# Patient Record
Sex: FEMALE | Race: WHITE | NOT HISPANIC OR LATINO | Employment: PART TIME | URBAN - METROPOLITAN AREA
[De-identification: names, ages, dates, MRNs, and addresses within clinical notes are randomized per-mention and may not be internally consistent; named-entity substitution may affect disease eponyms.]

---

## 2018-04-24 ENCOUNTER — TRANSFERRED RECORDS (OUTPATIENT)
Dept: HEALTH INFORMATION MANAGEMENT | Facility: CLINIC | Age: 48
End: 2018-04-24

## 2018-04-24 LAB
ALT SERPL-CCNC: 24 IU/L (ref 8–45)
AST SERPL-CCNC: 16 IU/L (ref 2–40)
CREAT SERPL-MCNC: 1.14 MG/DL (ref 0.57–1.11)
GFR SERPL CREATININE-BSD FRML MDRD: 51 ML/MIN/1.73M2
GLUCOSE SERPL-MCNC: 130 MG/DL (ref 65–100)
INR PPP: 1
POTASSIUM SERPL-SCNC: 3.9 MMOL/L (ref 3.5–5)

## 2018-04-25 ENCOUNTER — TRANSFERRED RECORDS (OUTPATIENT)
Dept: HEALTH INFORMATION MANAGEMENT | Facility: CLINIC | Age: 48
End: 2018-04-25

## 2018-04-25 LAB
CREAT SERPL-MCNC: 0.95 MG/DL (ref 0.57–1.11)
GFR SERPL CREATININE-BSD FRML MDRD: >60 ML/MIN/1.73M2
GLUCOSE SERPL-MCNC: 145 MG/DL (ref 65–100)
POTASSIUM SERPL-SCNC: 3.2 MMOL/L (ref 3.5–5)
POTASSIUM SERPL-SCNC: 3.7 MMOL/L (ref 3.5–5)

## 2018-04-26 ENCOUNTER — TRANSFERRED RECORDS (OUTPATIENT)
Dept: HEALTH INFORMATION MANAGEMENT | Facility: CLINIC | Age: 48
End: 2018-04-26

## 2018-04-26 LAB
CREAT SERPL-MCNC: 0.83 MG/DL (ref 0.57–1.11)
GFR SERPL CREATININE-BSD FRML MDRD: >60 ML/MIN/1.73M2
GLUCOSE SERPL-MCNC: 127 MG/DL (ref 65–100)
POTASSIUM SERPL-SCNC: 3.6 MMOL/L (ref 3.5–5)

## 2018-04-27 LAB
CREAT SERPL-MCNC: 0.89 MG/DL (ref 0.57–1.11)
GFR SERPL CREATININE-BSD FRML MDRD: >60 ML/MIN/1.73M2
GLUCOSE SERPL-MCNC: 136 MG/DL (ref 65–100)
POTASSIUM SERPL-SCNC: 3.8 MMOL/L (ref 3.5–5)

## 2019-02-14 ENCOUNTER — TRANSFERRED RECORDS (OUTPATIENT)
Dept: HEALTH INFORMATION MANAGEMENT | Facility: CLINIC | Age: 49
End: 2019-02-14

## 2019-03-27 ENCOUNTER — TRANSFERRED RECORDS (OUTPATIENT)
Dept: HEALTH INFORMATION MANAGEMENT | Facility: CLINIC | Age: 49
End: 2019-03-27

## 2019-04-09 ENCOUNTER — TRANSFERRED RECORDS (OUTPATIENT)
Dept: HEALTH INFORMATION MANAGEMENT | Facility: CLINIC | Age: 49
End: 2019-04-09

## 2019-04-12 ENCOUNTER — TRANSFERRED RECORDS (OUTPATIENT)
Dept: HEALTH INFORMATION MANAGEMENT | Facility: CLINIC | Age: 49
End: 2019-04-12

## 2019-04-12 ENCOUNTER — PRE VISIT (OUTPATIENT)
Dept: UROLOGY | Facility: CLINIC | Age: 49
End: 2019-04-12

## 2019-04-12 DIAGNOSIS — N31.9 NEUROGENIC BLADDER: Primary | ICD-10-CM

## 2019-04-12 NOTE — TELEPHONE ENCOUNTER
Reason for visit: recurrent kidney infections, possible blockage at the urostomy site    Relevant information: pt state she only has one kidney and has the urostomy since birth    Records/imaging/labs/orders: orders placed for creatinine and labs schedule    Pt called: yes    At Rooming: have catheters and bougies available

## 2019-04-15 ENCOUNTER — OFFICE VISIT (OUTPATIENT)
Dept: UROLOGY | Facility: CLINIC | Age: 49
End: 2019-04-15
Payer: COMMERCIAL

## 2019-04-15 ENCOUNTER — TRANSFERRED RECORDS (OUTPATIENT)
Dept: HEALTH INFORMATION MANAGEMENT | Facility: CLINIC | Age: 49
End: 2019-04-15

## 2019-04-15 VITALS
DIASTOLIC BLOOD PRESSURE: 62 MMHG | SYSTOLIC BLOOD PRESSURE: 119 MMHG | HEART RATE: 83 BPM | HEIGHT: 58 IN | WEIGHT: 157.1 LBS | BODY MASS INDEX: 32.98 KG/M2

## 2019-04-15 DIAGNOSIS — T85.858A STENOSIS OF ILEAL CONDUIT STOMA, INITIAL ENCOUNTER: Primary | ICD-10-CM

## 2019-04-15 DIAGNOSIS — N31.9 NEUROGENIC BLADDER: ICD-10-CM

## 2019-04-15 LAB
CREAT SERPL-MCNC: 0.87 MG/DL (ref 0.52–1.04)
GFR SERPL CREATININE-BSD FRML MDRD: 78 ML/MIN/{1.73_M2}

## 2019-04-15 RX ORDER — MOXIFLOXACIN 5 MG/ML
SOLUTION/ DROPS OPHTHALMIC
Refills: 1 | COMMUNITY
Start: 2019-03-22 | End: 2019-07-01

## 2019-04-15 RX ORDER — RIZATRIPTAN BENZOATE 10 MG/1
TABLET ORAL PRN
COMMUNITY
Start: 2018-02-28

## 2019-04-15 RX ORDER — LORAZEPAM 0.5 MG/1
0.5 TABLET ORAL PRN
COMMUNITY
Start: 2017-11-17 | End: 2023-07-20

## 2019-04-15 ASSESSMENT — ENCOUNTER SYMPTOMS
DOUBLE VISION: 0
FATIGUE: 0
CHILLS: 0
POLYDIPSIA: 0
POLYPHAGIA: 0
EYE REDNESS: 0
NIGHT SWEATS: 0
INCREASED ENERGY: 0
WEIGHT GAIN: 0
DECREASED APPETITE: 0
EYE PAIN: 0
WEIGHT LOSS: 0
ALTERED TEMPERATURE REGULATION: 0
EYE IRRITATION: 0
EYE WATERING: 0
HALLUCINATIONS: 0
FEVER: 1

## 2019-04-15 ASSESSMENT — PAIN SCALES - GENERAL: PAINLEVEL: NO PAIN (0)

## 2019-04-15 ASSESSMENT — MIFFLIN-ST. JEOR: SCORE: 1222.35

## 2019-04-15 NOTE — LETTER
4/15/2019       RE: Karo Lindsay  [address] [facility] [address]     Dear Colleague,    Thank you for referring your patient, Karo Lindsay, to the [facility] UROLOGY AND [facility] PROSTATE AND UROLOGIC CANCERS at [facility]. Please see a copy of my visit note below.      Urology Clinic    Akhil Causey MD  Date of Service: 04/15/2019     Name: Karo Lindsay  MRN: [id number]  Age: 49 year old  : 1970  Referring provider: Karlos Corrigan     Assessment and Plan:  1. Persistent moderate left hydroureteronephrosis of a solitary kidney in the setting of:    -cystectomy and ileal conduit urinary diversion.   -No visualized urinary tract calculi.   -Hx of right kidney removal in     2. Stomal stenosis    The patient describes having a Loopogram in the past at [facility]. We cannot view these images today.    The risks, benefits and alternatives of revision of ileal conduit were described. We discussed a same-day procedure if the stenosis area is superficial and a more complicated procedure with laparotomy if the stenosis is more extensive.      We discussed the risk of hernia being about 1/5. Risk of another stenosis is about 1/5. We covered surgical risks which include hernias, loss of sensation around incisions, decreased renal function, and infection.  We discussed that it is not likely to need a blood transfusion given this procedure. I discussed the risk with one kidney given the hydronephrosis.  Additional procedures may be necessary in the perioperative period. There are other additional unexpected outcomes which may occur.    3. 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 Roger Cadena    ---I will follow up with the outside images from [place]   --We will call the patient about further management for stomal  stenosis  ---------------------------------------------------------------------------------------------------------------------    Chief Complaint:  Evaluation for recurrent kidney stones, possible blockage at urostomy site, referred by Dr. Corrigan    HPI:   Karo Lindsay  is a 49 year old female with a history of chronic UTI's in the presence of a previous nephrectomy in 2005, history of cloacal exstrophy, congenital myelomeningocele, colostomy since age 1, previous hysterectomy, previous vaginal reconstruction surgery and history of ileal conduit at age 1. She has a remote history of kidney stones 20+ years ago (lithotripsy, last 30ish years ago). In regard to her chronic UTI's, she was previously getting urinary tract infections every 6-8 weeks about 5 years ago. She had previously been on self directed therapy 5.5 years ago. She had a recent CT scan on 04/09/2019 which shows: persistent moderate left hydroureteronephrosis of a solitary kidney with  cystectomy and ileal conduit urinary diversion. No visualized urinary tract calculi.      The patient also describes a autoimmune retinopathy for which she took Humira. She is no longer taking humira or other autoimmune suppressants. The patient is being followed by Dr. Roger Cadena for oncology. The patient had a limited left thoracotomy and lingulectomy for a malignant lung tumor. Final Surgical Pathology Revealed: Typical carcinoid tumor, 3.1 cm, in the left upper lobe lung. Benign surgical margin and no visceral pleural involvement. Three mediastinal lymph nodes are benign but show poorly formed non-necrotizing granulomas. Final pathologic stage [id number], Final stage IB.    Today, the patient states that she had some very severe pain on her left side similar to her kidney stones in the past. She notes that she may have recently passed a kidney stone.         Review of Systems:   Pertinent items are noted in HPI or as below, remainder of complete ROS is  "negative.      Active Medications:      LORazepam (ATIVAN) 0.5 MG tablet, Take 0.5 mg by mouth as needed, Disp: , Rfl:      rizatriptan (MAXALT) 10 MG tablet, Take by mouth as needed, Disp: , Rfl:      moxifloxacin (VIGAMOX) 0.5 % ophthalmic solution, INSTILL 1 DROP INTO RIGHT EYE 4 X A DAY START 1 DAY BEFORE SURGERY AND USE UNTIL BOTTLE IS FINISHED, Disp: , Rfl: 1      Allergies:   Moxifloxacin; Contrast dye; Propoxyphene n-apap; and Penicillins      Past Medical History:  Chronic UTI  Cloacal exstrophy      Past Surgical History:  Bladder ileal conduit  Hysterectomy  Nephrectomy    Family History:   No past pertinent family history.     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     Physical Exam:   B/P: 119/62, T: Data Unavailable, P: 83, R: Data Unavailable  Estimated body mass index is 32.83 kg/m  as calculated from the following:    Height as of this encounter: 1.473 m (4' 10\").    Weight as of this encounter: 71.3 kg (157 lb 1.6 oz).  General: age-appropriate appearing female in NAD. Normal body habitus.  HEENT: Head AT/NC, EOMI, CN Grossly intact  Abdomen: mild obesity , soft, non-distended, non-tender. No organomegaly. Surgical scars include:  Midline incision from pubic area to 3 finger breaths below the xiphoid process.  Right lower quadrant incision from prior osteotomy and a matching one on left, incisions in pubic area from cloacal exstrophy. Urostomy on the right side has a good mucosa of the stoma with an opening about the size of a pencil eraser. I was not able to insert a pinkie finger -- she reports that Dr. Corrigan could not insert a catheter. Colostomy on the left side everts nicely.    : Deferred  LE: No edema.   Neuro: grossly intact  Motor: excellent strength throughout  Skin: clear of rashes or ecchymoses.     Imaging:   I have personally reviewed the results of the below imaging studies. The results " were discussed with the patient.     03/13/2019 CT chest WO:  1.  Stable appearance of the chest with no evidence of disease recurrence.    Reading per radiology    04/09/2019 CT Abdomen, pelvis wo:  1.  Persistent moderate left hydroureteronephrosis of a solitary kidney with    cystectomy and ileal conduit urinary diversion. No visualized urinary tract    calculi. This may be on the basis of a stricture at the ureteroileal    anastomosis.    2.  Status post resection of a left carcinoid tumor.   Reading per radiology     Laboratory:   I reviewed all applicable laboratory and pathology data and went over findings with patient  Significant for     Lab Results   Component Value Date    CR 0.87 04/15/2019     Scribe Disclosure:  I, Sebastián Sidhu, am serving as a scribe to document services personally performed by Akhil Causey MD at this visit, based upon the provider's statements to me. All documentation has been reviewed by the aforementioned provider prior to being entered into the official medical record.     CC  No care team member to display  HUSSAIN MUNOZ    Copy to patient  ROSA ELENA WILKERSON ARMEN  [address] [place], [place] [id number]    Answers for HPI/ROS submitted by the patient on 4/15/2019   General Symptoms: Yes  Skin Symptoms: No  HENT Symptoms: No  EYE SYMPTOMS: Yes  HEART SYMPTOMS: No  LUNG SYMPTOMS: No  INTESTINAL SYMPTOMS: No  URINARY SYMPTOMS: No  GYNECOLOGIC SYMPTOMS: No  BREAST SYMPTOMS: No  SKELETAL SYMPTOMS: No  BLOOD SYMPTOMS: No  NERVOUS SYSTEM SYMPTOMS: No  MENTAL HEALTH SYMPTOMS: No  Fever: Yes  Loss of appetite: No  Weight loss: No  Weight gain: No  Fatigue: No  Night sweats: No  Chills: No  Increased stress: No  Excessive hunger: No  Excessive thirst: No  Feeling hot or cold when others believe the temperature is normal: No  Loss of height: No  Post-operative complications: No  Surgical site pain: No  Hallucinations: No  Change in or Loss of Energy:  No  Hyperactivity: No  Confusion: No  Eye pain: No  Vision loss: No  Dry eyes: No  Watery eyes: No  Eye bulging: No  Double vision: No  Flashing of lights: No  Spots: No  Floaters: Yes  Redness: No  Crossed eyes: No  Tunnel Vision: No  Yellowing of eyes: No  Eye irritation: No      Again, thank you for allowing me to participate in the care of your patient.      Sincerely,    Akhil Causey MD

## 2019-04-15 NOTE — NURSING NOTE
"Chief Complaint   Patient presents with     Consult     issues with urostomy, recurrent kidney infections       Blood pressure 119/62, pulse 83, height 1.473 m (4' 10\"), weight 71.3 kg (157 lb 1.6 oz). Body mass index is 32.83 kg/m .    There is no problem list on file for this patient.      Allergies   Allergen Reactions     Moxifloxacin Hives     Contrast Dye Other (See Comments)     \"I am not supposed to have because I only have one kidney.\"     Propoxyphene N-Apap Nausea and GI Disturbance     vomiting  vomiting       Penicillins Rash       Current Outpatient Medications   Medication Sig Dispense Refill     LORazepam (ATIVAN) 0.5 MG tablet Take 0.5 mg by mouth as needed       rizatriptan (MAXALT) 10 MG tablet Take by mouth as needed       moxifloxacin (VIGAMOX) 0.5 % ophthalmic solution INSTILL 1 DROP INTO RIGHT EYE 4 X A DAY START 1 DAY BEFORE SURGERY AND USE UNTIL BOTTLE IS FINISHED  1       Social History     Tobacco Use     Smoking status: Never Smoker     Smokeless tobacco: Never Used   Substance Use Topics     Alcohol use: None     Drug use: None       Kerry Echevarria LPN  4/15/2019  7:04 AM  "

## 2019-04-15 NOTE — PROGRESS NOTES
Urology Clinic    Akhil Causey MD  Date of Service: 04/15/2019     Name: Karo Lindsay  MRN: [id number]  Age: 49 year old  : 1970  Referring provider: Karlos Corrigan     Assessment and Plan:  1. Persistent moderate left hydroureteronephrosis of a solitary kidney in the setting of:    -cystectomy and ileal conduit urinary diversion.   -No visualized urinary tract calculi.   -Hx of right kidney removal in     2. Stomal stenosis    The patient describes having a Loopogram in the past at [facility]. We cannot view these images today.    The risks, benefits and alternatives of revision of ileal conduit were described. We discussed a same-day procedure if the stenosis area is superficial and a more complicated procedure with laparotomy if the stenosis is more extensive.      We discussed the risk of hernia being about 1/5. Risk of another stenosis is about 1/5. We covered surgical risks which include hernias, loss of sensation around incisions, decreased renal function, and infection.  We discussed that it is not likely to need a blood transfusion given this procedure. I discussed the risk with one kidney given the hydronephrosis.  Additional procedures may be necessary in the perioperative period. There are other additional unexpected outcomes which may occur.    3. Carcinoid tumor ([id number], Final stage IB), 3.1 cm, in the left upper lobe lung s/p Limited left thoracotomy 2018, Dr. Roger Cadena    ---I will follow up with the outside images from [place]   --We will call the patient about further management for stomal stenosis  ---------------------------------------------------------------------------------------------------------------------    Chief Complaint:  Evaluation for recurrent kidney stones, possible blockage at urostomy site, referred by Dr. Corrigan    HPI:   Karo Lindsay  is a 49 year old female with a history of chronic UTI's in the presence of a previous  nephrectomy in 2005, history of cloacal exstrophy, congenital myelomeningocele, colostomy since age 1, previous hysterectomy, previous vaginal reconstruction surgery and history of ileal conduit at age 1. She has a remote history of kidney stones 20+ years ago (lithotripsy, last 30ish years ago). In regard to her chronic UTI's, she was previously getting urinary tract infections every 6-8 weeks about 5 years ago. She had previously been on self directed therapy 5.5 years ago. She had a recent CT scan on 04/09/2019 which shows: persistent moderate left hydroureteronephrosis of a solitary kidney with  cystectomy and ileal conduit urinary diversion. No visualized urinary tract calculi.      The patient also describes a autoimmune retinopathy for which she took Humira. She is no longer taking humira or other autoimmune suppressants. The patient is being followed by Dr. Roger Cadena for oncology. The patient had a limited left thoracotomy and lingulectomy for a malignant lung tumor. Final Surgical Pathology Revealed: Typical carcinoid tumor, 3.1 cm, in the left upper lobe lung. Benign surgical margin and no visceral pleural involvement. Three mediastinal lymph nodes are benign but show poorly formed non-necrotizing granulomas. Final pathologic stage [id number], Final stage IB.    Today, the patient states that she had some very severe pain on her left side similar to her kidney stones in the past. She notes that she may have recently passed a kidney stone.         Review of Systems:   Pertinent items are noted in HPI or as below, remainder of complete ROS is negative.      Active Medications:      LORazepam (ATIVAN) 0.5 MG tablet, Take 0.5 mg by mouth as needed, Disp: , Rfl:      rizatriptan (MAXALT) 10 MG tablet, Take by mouth as needed, Disp: , Rfl:      moxifloxacin (VIGAMOX) 0.5 % ophthalmic solution, INSTILL 1 DROP INTO RIGHT EYE 4 X A DAY START 1 DAY BEFORE SURGERY AND USE UNTIL BOTTLE IS FINISHED, Disp: ,  "Rfl: 1      Allergies:   Moxifloxacin; Contrast dye; Propoxyphene n-apap; and Penicillins      Past Medical History:  Chronic UTI  Cloacal exstrophy      Past Surgical History:  Bladder ileal conduit  Hysterectomy  Nephrectomy    Family History:   No past pertinent family history.     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     Physical Exam:   B/P: 119/62, T: Data Unavailable, P: 83, R: Data Unavailable  Estimated body mass index is 32.83 kg/m  as calculated from the following:    Height as of this encounter: 1.473 m (4' 10\").    Weight as of this encounter: 71.3 kg (157 lb 1.6 oz).  General: age-appropriate appearing female in NAD. Normal body habitus.  HEENT: Head AT/NC, EOMI, CN Grossly intact  Abdomen: mild obesity , soft, non-distended, non-tender. No organomegaly. Surgical scars include:  Midline incision from pubic area to 3 finger breaths below the xiphoid process.  Right lower quadrant incision from prior osteotomy and a matching one on left, incisions in pubic area from cloacal exstrophy. Urostomy on the right side has a good mucosa of the stoma with an opening about the size of a pencil eraser. I was not able to insert a pinkie finger -- she reports that Dr. Corrigan could not insert a catheter. Colostomy on the left side everts nicely.    : Deferred  LE: No edema.   Neuro: grossly intact  Motor: excellent strength throughout  Skin: clear of rashes or ecchymoses.     Imaging:   I have personally reviewed the results of the below imaging studies. The results were discussed with the patient.     03/13/2019 CT chest WO:  1.  Stable appearance of the chest with no evidence of disease recurrence.    Reading per radiology    04/09/2019 CT Abdomen, pelvis wo:  1.  Persistent moderate left hydroureteronephrosis of a solitary kidney with    cystectomy and ileal conduit urinary diversion. No visualized urinary tract  "   calculi. This may be on the basis of a stricture at the ureteroileal    anastomosis.    2.  Status post resection of a left carcinoid tumor.   Reading per radiology     Laboratory:   I reviewed all applicable laboratory and pathology data and went over findings with patient  Significant for     Lab Results   Component Value Date    CR 0.87 04/15/2019     Scribe Disclosure:  I, Sebastián Sidhu, am serving as a scribe to document services personally performed by Akhil Causey MD at this visit, based upon the provider's statements to me. All documentation has been reviewed by the aforementioned provider prior to being entered into the official medical record.     CC  No care team member to display  HUSSAIN MUNOZ    Copy to patient  ROSA ELENA WILKERSON AYERS  [address] [place] [address]    Answers for HPI/ROS submitted by the patient on 4/15/2019   General Symptoms: Yes  Skin Symptoms: No  HENT Symptoms: No  EYE SYMPTOMS: Yes  HEART SYMPTOMS: No  LUNG SYMPTOMS: No  INTESTINAL SYMPTOMS: No  URINARY SYMPTOMS: No  GYNECOLOGIC SYMPTOMS: No  BREAST SYMPTOMS: No  SKELETAL SYMPTOMS: No  BLOOD SYMPTOMS: No  NERVOUS SYSTEM SYMPTOMS: No  MENTAL HEALTH SYMPTOMS: No  Fever: Yes  Loss of appetite: No  Weight loss: No  Weight gain: No  Fatigue: No  Night sweats: No  Chills: No  Increased stress: No  Excessive hunger: No  Excessive thirst: No  Feeling hot or cold when others believe the temperature is normal: No  Loss of height: No  Post-operative complications: No  Surgical site pain: No  Hallucinations: No  Change in or Loss of Energy: No  Hyperactivity: No  Confusion: No  Eye pain: No  Vision loss: No  Dry eyes: No  Watery eyes: No  Eye bulging: No  Double vision: No  Flashing of lights: No  Spots: No  Floaters: Yes  Redness: No  Crossed eyes: No  Tunnel Vision: No  Yellowing of eyes: No  Eye irritation: No

## 2019-04-24 ENCOUNTER — TRANSFERRED RECORDS (OUTPATIENT)
Dept: HEALTH INFORMATION MANAGEMENT | Facility: CLINIC | Age: 49
End: 2019-04-24

## 2019-04-29 ENCOUNTER — TELEPHONE (OUTPATIENT)
Dept: UROLOGY | Facility: CLINIC | Age: 49
End: 2019-04-29

## 2019-04-29 DIAGNOSIS — N13.30 HYDRONEPHROSIS, UNSPECIFIED HYDRONEPHROSIS TYPE: Primary | ICD-10-CM

## 2019-04-29 RX ORDER — SULFAMETHOXAZOLE/TRIMETHOPRIM 800-160 MG
1 TABLET ORAL 2 TIMES DAILY
Qty: 6 TABLET | Refills: 0 | Status: SHIPPED | OUTPATIENT
Start: 2019-04-29 | End: 2019-05-01

## 2019-04-29 NOTE — TELEPHONE ENCOUNTER
[facility] Call Center    Phone Message    May a detailed message be left on voicemail: yes    Reason for Call: Other: Karo calling back requesting a call back. Please call her back as soon as possible.      Action Taken: Message routed to:  [facility] miguel uro

## 2019-04-29 NOTE — TELEPHONE ENCOUNTER
[facility] Call Center    Phone Message    May a detailed message be left on voicemail: yes    Reason for Call: Other: Pt has started symptoms of a kidney infection, pt is also waiting to hear back regarding testing she had done a few weeks ago, please call to discuss     Action Taken: Message routed to:  [facility] Urology

## 2019-04-30 NOTE — TELEPHONE ENCOUNTER
[facility] Call Center    Phone Message    May a detailed message be left on voicemail: yes    Reason for Call: Other: Pt called to follow up with Dr. Causey regarding the kidney infection she currently has. She was given a prescription for levocrine to take for that, and the provider that prescribed it wanted the pt to ask Dr. Causey if he still wanted her to take bactim 1 day before and 2 days after her loopogram, or if the levocrine would be sufficient since she is currently responding well to that.     Action Taken: Message routed to:  [facility] Urology

## 2019-05-01 NOTE — TELEPHONE ENCOUNTER
Informed patient that has long as she's on the Levaquin the day before her loopogram, day of and day after that she doesn't need to take the Bactrim.  She will be on the Levaquin until Saturday so I will discontinue the Bactrim.    Vivien Sloan RN, BSN  Care Coordinator- Reconstructive Urology

## 2019-05-02 ENCOUNTER — ANCILLARY PROCEDURE (OUTPATIENT)
Dept: GENERAL RADIOLOGY | Facility: CLINIC | Age: 49
End: 2019-05-02
Attending: UROLOGY
Payer: COMMERCIAL

## 2019-05-02 ENCOUNTER — TELEPHONE (OUTPATIENT)
Dept: UROLOGY | Facility: CLINIC | Age: 49
End: 2019-05-02

## 2019-05-02 DIAGNOSIS — N13.30 HYDRONEPHROSIS, UNSPECIFIED HYDRONEPHROSIS TYPE: ICD-10-CM

## 2019-05-10 ENCOUNTER — PRE VISIT (OUTPATIENT)
Dept: UROLOGY | Facility: CLINIC | Age: 49
End: 2019-05-10

## 2019-05-10 NOTE — TELEPHONE ENCOUNTER
Reason for visit: discuss surgery     Relevant information:     Records/imaging/labs/orders: all records available    Pt called: no need for a call    At Rooming: regular

## 2019-05-13 ENCOUNTER — OFFICE VISIT (OUTPATIENT)
Dept: UROLOGY | Facility: CLINIC | Age: 49
End: 2019-05-13
Payer: COMMERCIAL

## 2019-05-13 ENCOUNTER — DOCUMENTATION ONLY (OUTPATIENT)
Dept: CARE COORDINATION | Facility: CLINIC | Age: 49
End: 2019-05-13

## 2019-05-13 VITALS
SYSTOLIC BLOOD PRESSURE: 131 MMHG | BODY MASS INDEX: 32.95 KG/M2 | DIASTOLIC BLOOD PRESSURE: 74 MMHG | HEART RATE: 98 BPM | WEIGHT: 157 LBS | HEIGHT: 58 IN

## 2019-05-13 DIAGNOSIS — N31.9 NEUROGENIC BLADDER: ICD-10-CM

## 2019-05-13 DIAGNOSIS — N13.30 HYDRONEPHROSIS, UNSPECIFIED HYDRONEPHROSIS TYPE: ICD-10-CM

## 2019-05-13 DIAGNOSIS — T85.858A STENOSIS OF ILEAL CONDUIT STOMA, INITIAL ENCOUNTER: Primary | ICD-10-CM

## 2019-05-13 ASSESSMENT — MIFFLIN-ST. JEOR: SCORE: 1221.9

## 2019-05-13 ASSESSMENT — PAIN SCALES - GENERAL: PAINLEVEL: NO PAIN (0)

## 2019-05-13 NOTE — LETTER
Date:May 16, 2019      Patient was self referred, no letter generated. Do not send.        [facility] Health Information

## 2019-05-13 NOTE — PROGRESS NOTES
Urology Clinic    Akhil Causey MD  Date of Service: 2019     Name: Karo Lindsay  MRN: [id number]  Age: 49 year old  : 1970  Referring provider: Provider Not In System     Assessment and Plan:  1. Persistent moderate left hydroureteronephrosis of a solitary kidney and recurrent UTI in setting of ileal conduit urinary diversion.  Loopogram reveals stenosis throughout at least the distal half of the conduit with associated reflux into a dilated left upper tract.  In light of the findings on extensive stenosis of the conduit, stomal revision would be of no utility.  Instead, patient will require conduit revision -- I recommend we start with circumferential dissection of the stoma at the skin level down to the fascia followed by midline laparotomy. After resecting the diseased section we would then scope her remaining proximal end of the conduit and decide whether we do one of three options for recosntruction:  1. Mature the proximal end to the skin  2. Add a section of ileum as a bridge to the skin  3. Resect the remainder of the conduit and built a completely new conduit.    We did discuss that some patients can be managed with a nephroureteral stent across the stenotic conduit but that given her long life expectancy and solitary kidney I recommended against that.     If she does not wish to proceed with surgery in the next couple of months then I recommend a PNT until surgery. I stressed the importance of an aggressive approach to her problem in light of the solitary kidney. She was hesitant to proceed because her Cr is normal right now and she thought she only needed antibiotic therapy for the UTIs.    After an extensive discussion the patient and family will proceed with a second opinion at [place]. With her permission, I emailed Dr. Jaime to expedite the appt. We will follow up by phone to discuss next steps. Of note she is interested in me reviewing her prior loopogram from 1.5 years ago  "at [place] to see if there has been progression of the stenosis in that time.         As the attending surgeon, I, Akhil Causey, spent 30 minutes with the patient with >50% in consultation and coordination of care.      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  She returns today for follow up and review of loopogram.      The patient reports one symptomatic UTI since last visit.  She experienced significant left flank pain following the loopogram which resolved the next day.      Review of Systems:   Pertinent items are noted in HPI or as below, remainder of complete ROS is negative.      Active Medications:     Current Outpatient Medications:      LORazepam (ATIVAN) 0.5 MG tablet, Take 0.5 mg by mouth as needed, Disp: , Rfl:      moxifloxacin (VIGAMOX) 0.5 % ophthalmic solution, INSTILL 1 DROP INTO RIGHT EYE 4 X A DAY START 1 DAY BEFORE SURGERY AND USE UNTIL BOTTLE IS FINISHED, Disp: , Rfl: 1     rizatriptan (MAXALT) 10 MG tablet, Take by mouth as needed, Disp: , Rfl:       Allergies:   Moxifloxacin; Contrast dye; Propoxyphene n-apap; and Penicillins      Past Medical History:  Chronic UTI  Cloacal Exstrophy      Past Surgical History:  C Remv bladder/nodes ileal conduit  Colostomy  Hysterectomy  Nephrectomy    Family History:   No past pertinent family history.     Social History:   The patient denies smoking or drug use     Physical Exam:   B/P: Data Unavailable, T: Data Unavailable, P: Data Unavailable, R: Data Unavailable  Estimated body mass index is 32.83 kg/m  " "as calculated from the following:    Height as of 4/15/19: 1.473 m (4' 10\").    Weight as of 4/15/19: 71.3 kg (157 lb 1.6 oz).  General: age-appropriate appearing female in NAD. Mildly obese body habitus.  HEENT: Head AT/NC, EOMI, CN Grossly intact  Resp: no respiratory distress, lung sounds clear.  Neuro: grossly intac       Imaging:   I have personally reviewed the results of the below imaging studies. The results were discussed with the patient.   Loopogram from May 2019 shows stenosis of the distal 1/2 of the conduit. There is reflux into a dilated renal collecting system.             Laboratory:   I reviewed all applicable laboratory and pathology data and went over findings with patient      Lab Results   Component Value Date    CR 0.87 04/15/2019     BMP RESULTS:  Recent Labs   Lab Test 04/15/19  0652   CR 0.87   GFRESTIMATED 78   GFRESTBLACK >90       Triston Montes MD - PGY4  Urology Resident        "

## 2019-05-13 NOTE — NURSING NOTE
"Chief Complaint   Patient presents with     Consult     hydronephrosis, discuss surgery       Blood pressure 131/74, pulse 98, height 1.473 m (4' 10\"), weight 71.2 kg (157 lb). Body mass index is 32.81 kg/m .    There is no problem list on file for this patient.      Allergies   Allergen Reactions     Moxifloxacin Hives     Contrast Dye Other (See Comments)     \"I am not supposed to have because I only have one kidney.\"     Propoxyphene N-Apap Nausea and GI Disturbance     vomiting  vomiting       Penicillins Rash       Current Outpatient Medications   Medication Sig Dispense Refill     LORazepam (ATIVAN) 0.5 MG tablet Take 0.5 mg by mouth as needed       moxifloxacin (VIGAMOX) 0.5 % ophthalmic solution INSTILL 1 DROP INTO RIGHT EYE 4 X A DAY START 1 DAY BEFORE SURGERY AND USE UNTIL BOTTLE IS FINISHED  1     rizatriptan (MAXALT) 10 MG tablet Take by mouth as needed         Social History     Tobacco Use     Smoking status: Never Smoker     Smokeless tobacco: Never Used   Substance Use Topics     Alcohol use: None     Drug use: None       Kerry Echevarria LPN  5/13/2019  2:08 PM  "

## 2019-05-13 NOTE — LETTER
2019       RE: Karo Lindsay  [address] [address]     Dear Colleague,    Thank you for referring your patient, Karo Lindsay, to the [facility] UROLOGY AND INST FOR PROSTATE AND UROLOGIC CANCERS at [facility]. Please see a copy of my visit note below.      Urology Clinic    Akhil Causey MD  Date of Service: 2019     Name: Karo Lindsay  MRN: [id number]  Age: 49 year old  : 1970  Referring provider: Provider Not In System     Assessment and Plan:  1. Persistent moderate left hydroureteronephrosis of a solitary kidney and recurrent UTI in setting of ileal conduit urinary diversion.  Loopogram reveals stenosis throughout at least the distal half of the conduit with associated reflux into a dilated left upper tract.  In light of the findings on extensive stenosis of the conduit, stomal revision would be of no utility.  Instead, patient will require conduit revision -- I recommend we start with circumferential dissection of the stoma at the skin level down to the fascia followed by midline laparotomy. After resecting the diseased section we would then scope her remaining proximal end of the conduit and decide whether we do one of three options for recosntruction:  1. Mature the proximal end to the skin  2. Add a section of ileum as a bridge to the skin  3. Resect the remainder of the conduit and built a completely new conduit.    We did discuss that some patients can be managed with a nephroureteral stent across the stenotic conduit but that given her long life expectancy and solitary kidney I recommended against that.     If she does not wish to proceed with surgery in the next couple of months then I recommend a PNT until surgery. I stressed the importance of an aggressive approach to her problem in light of the solitary kidney. She was hesitant to proceed because her Cr is normal right now and she thought she only needed  "antibiotic therapy for the UTIs.    After an extensive discussion the patient and family will proceed with a second opinion at [place]. With her permission, I emailed Dr. Jaime to expedite the appt. We will follow up by phone to discuss next steps. Of note she is interested in me reviewing her prior loopogram from 1.5 years ago at [place] to see if there has been progression of the stenosis in that time.         As the attending surgeon, I, Akhil Causey, spent 30 minutes with the patient with >50% in consultation and coordination of care.      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  She returns today for follow up and review of loopogram.      The patient reports one symptomatic UTI since last visit.  She experienced significant left flank pain following the loopogram which resolved the next day.      Review of Systems:   Pertinent items are noted in HPI or as below, remainder of complete ROS is negative.      Active Medications:     Current Outpatient Medications:      LORazepam (ATIVAN) 0.5 MG tablet, Take 0.5 mg by mouth as needed, Disp: , Rfl:      moxifloxacin (VIGAMOX) 0.5 % ophthalmic solution, INSTILL 1 DROP INTO RIGHT EYE 4 X A DAY START 1 DAY BEFORE SURGERY AND USE UNTIL BOTTLE IS FINISHED, Disp: , Rfl: 1     rizatriptan (MAXALT) 10 MG tablet, Take by mouth as needed, Disp: , Rfl:       Allergies:   Moxifloxacin; Contrast dye; Propoxyphene n-apap; and Penicillins      Past Medical History:  Chronic UTI  Cloacal Exstrophy      Past Surgical History:  YAZMIN Grimaldo " "bladder/nodes ileal conduit  Colostomy  Hysterectomy  Nephrectomy    Family History:   No past pertinent family history.     Social History:   The patient denies smoking or drug use     Physical Exam:   B/P: Data Unavailable, T: Data Unavailable, P: Data Unavailable, R: Data Unavailable  Estimated body mass index is 32.83 kg/m  as calculated from the following:    Height as of 4/15/19: 1.473 m (4' 10\").    Weight as of 4/15/19: 71.3 kg (157 lb 1.6 oz).  General: age-appropriate appearing female in NAD. Mildly obese body habitus.  HEENT: Head AT/NC, EOMI, CN Grossly intact  Resp: no respiratory distress, lung sounds clear.  Neuro: grossly intac       Imaging:   I have personally reviewed the results of the below imaging studies. The results were discussed with the patient.   Loopogram from May 2019 shows stenosis of the distal 1/2 of the conduit. There is reflux into a dilated renal collecting system.             Laboratory:   I reviewed all applicable laboratory and pathology data and went over findings with patient      Lab Results   Component Value Date    CR 0.87 04/15/2019     BMP RESULTS:  Recent Labs   Lab Test 04/15/19  0652   CR 0.87   GFRESTIMATED 78   GFRESTBLACK >90       Triston Montes MD - PGY4  Urology Resident          Again, thank you for allowing me to participate in the care of your patient.      Sincerely,    Akhil Causey MD      "

## 2019-05-14 ENCOUNTER — TELEPHONE (OUTPATIENT)
Dept: UROLOGY | Facility: CLINIC | Age: 49
End: 2019-05-14

## 2019-05-14 NOTE — TELEPHONE ENCOUNTER
[facility] Call Center    Phone Message    May a detailed message be left on voicemail: yes    Reason for Call: Requesting Results   Name/type of test: Loopogram  Date of test: 4.26.2018  Was test done at a location other than [facility] ?: Yes: [facility]    FYI: Pt simply provided the date of the exam    Action Taken: Message routed to:  [facility]

## 2019-05-17 NOTE — TELEPHONE ENCOUNTER
Left patient message to discuss.    Vivien Sloan, RN, BSN  Care Coordinator- Reconstructive Urology

## 2019-05-20 ENCOUNTER — PATIENT OUTREACH (OUTPATIENT)
Dept: UROLOGY | Facility: CLINIC | Age: 49
End: 2019-05-20

## 2019-05-20 NOTE — PROGRESS NOTES
Loopogram/nephrostogram on 5/2/19 along with Dr. Causey's clinic note from 5/13/19 faxed to [facility] for 2nd opinion to [phone].    Vivien Sloan, RN, BSN  Care Coordinator- Reconstructive Urology

## 2019-05-23 ENCOUNTER — TELEPHONE (OUTPATIENT)
Dept: UROLOGY | Facility: CLINIC | Age: 49
End: 2019-05-23

## 2019-05-23 NOTE — TELEPHONE ENCOUNTER
[facility]    Phone Message    May a detailed message be left on voicemail: yes    Reason for Call: Other: Pt reporting to Vivien with Dr. Causey----pt just got in touch with [place] regarding the images for loopogram.  [facility] still does NOT have access to these images. Please call pt to let her know when [facility] is sent these images. Pt is waiting. Thank you.     Action Taken: Message routed to:  [facility]  Urology clinic

## 2019-05-23 NOTE — TELEPHONE ENCOUNTER
Spoke to [facility], they request all records be sent to be reviewed prior to patient being able to get scheduled.  Message sent to [place]  to have loopogram from 5/2/19 be pushed to [facility].    Vivien Sloan RN, BSN  Care Coordinator- Reconstructive Urology

## 2019-05-28 ENCOUNTER — TELEPHONE (OUTPATIENT)
Dept: UROLOGY | Facility: CLINIC | Age: 49
End: 2019-05-28

## 2019-05-28 NOTE — TELEPHONE ENCOUNTER
[facility] Call Center    Phone Message    May a detailed message be left on voicemail: yes    Reason for Call: Other: Karo is calling to get more information on when she can have surgery and more information on the surgery itself. Please call pt back to further discuss.      Action Taken: Message routed to:  [facility]  Urology

## 2019-05-29 NOTE — TELEPHONE ENCOUNTER
Spoke to patient, she's scheduled for her second opinion with [facility] next Tuesday.  She's wondering how far out Dr. Causey is booking for surgery for a conduit revision.  Spoke to Eveline alba surgery scheduler and Dr. Causey is booking out about 2-3 weeks.  Informed patient, she will be back in touch with me after her appointment at [facility] with her decision.    Vivien Sloan RN, BSN  Care Coordinator- Reconstructive Urology

## 2019-06-04 ENCOUNTER — TRANSFERRED RECORDS (OUTPATIENT)
Dept: HEALTH INFORMATION MANAGEMENT | Facility: CLINIC | Age: 49
End: 2019-06-04

## 2019-06-11 ENCOUNTER — TRANSFERRED RECORDS (OUTPATIENT)
Dept: HEALTH INFORMATION MANAGEMENT | Facility: CLINIC | Age: 49
End: 2019-06-11

## 2019-06-27 ENCOUNTER — TELEPHONE (OUTPATIENT)
Dept: UROLOGY | Facility: CLINIC | Age: 49
End: 2019-06-27

## 2019-06-27 NOTE — TELEPHONE ENCOUNTER
[facility] Call Center    Phone Message    May a detailed message be left on voicemail: yes    Reason for Call: Symptoms or Concerns     If patient has red-flag symptoms, warm transfer to triage line    Current symptom or concern: Pain in kidney area L. She had the balloon procedure done at [place] on 6/11/2019. She experienced pain after the procedure and now. The dr at the the [place] said to get an US to see if there is blockage.     Symptoms have been present for: 24 hour(s)    Has patient previously been seen for this? No    By NA    Date: 6/27/2019  Are there any new or worsening symptoms? Yes: pain and vomited once yesterday      Action Taken: Message routed to:  [facility] urology

## 2019-06-28 ENCOUNTER — PRE VISIT (OUTPATIENT)
Dept: UROLOGY | Facility: CLINIC | Age: 49
End: 2019-06-28

## 2019-06-28 ENCOUNTER — ANCILLARY PROCEDURE (OUTPATIENT)
Dept: ULTRASOUND IMAGING | Facility: CLINIC | Age: 49
End: 2019-06-28
Attending: UROLOGY
Payer: COMMERCIAL

## 2019-06-28 ENCOUNTER — TELEPHONE (OUTPATIENT)
Dept: UROLOGY | Facility: CLINIC | Age: 49
End: 2019-06-28

## 2019-06-28 DIAGNOSIS — R10.9 RIGHT FLANK PAIN: Primary | ICD-10-CM

## 2019-06-28 DIAGNOSIS — R10.9 RIGHT FLANK PAIN: ICD-10-CM

## 2019-06-28 NOTE — TELEPHONE ENCOUNTER
Reason for visit: review imaging for hydronephrosis in solitary kidney     Relevant information: Od[place], ileal conduit    Records/imaging/labs/orders: imaging available    Pt called: no need for  call    At [facility]: regular

## 2019-06-28 NOTE — TELEPHONE ENCOUNTER
Spoke to patient to inform her that Dr. Causey will order a renal ultrasound for her severe left flank pain she's having since her balloon dilation of ureter done at [place] on 6/11/19.    Vivien Sloan RN, BSN  Care Coordinator- Reconstructive Urology

## 2019-06-28 NOTE — TELEPHONE ENCOUNTER
----- Message from Vivien Sloan RN sent at 6/28/2019  9:38 AM CDT -----  Regarding: Urgent- Renal US Today  Hi,    Please schedule a renal ultrasound today if possible.    Thanks,  Vivien

## 2019-06-28 NOTE — TELEPHONE ENCOUNTER
[facility] Call Center    Phone Message    May a detailed message be left on voicemail: yes    Reason for Call: Other: Pt calling back re: message below. She is still waiting to hear from the clinic.     Action Taken: Message routed to:  [facility]

## 2019-07-01 ENCOUNTER — ALLIED HEALTH/NURSE VISIT (OUTPATIENT)
Dept: UROLOGY | Facility: CLINIC | Age: 49
End: 2019-07-01
Payer: COMMERCIAL

## 2019-07-01 ENCOUNTER — PRE VISIT (OUTPATIENT)
Dept: SURGERY | Facility: CLINIC | Age: 49
End: 2019-07-01

## 2019-07-01 ENCOUNTER — OFFICE VISIT (OUTPATIENT)
Dept: UROLOGY | Facility: CLINIC | Age: 49
End: 2019-07-01
Payer: COMMERCIAL

## 2019-07-01 VITALS
HEIGHT: 58 IN | HEART RATE: 98 BPM | WEIGHT: 155.6 LBS | DIASTOLIC BLOOD PRESSURE: 80 MMHG | SYSTOLIC BLOOD PRESSURE: 122 MMHG | BODY MASS INDEX: 32.66 KG/M2

## 2019-07-01 DIAGNOSIS — N31.9 NEUROGENIC BLADDER: ICD-10-CM

## 2019-07-01 DIAGNOSIS — T85.858A STENOSIS OF ILEAL CONDUIT STOMA, INITIAL ENCOUNTER: Primary | ICD-10-CM

## 2019-07-01 DIAGNOSIS — N31.9 NEUROGENIC BLADDER: Primary | ICD-10-CM

## 2019-07-01 RX ORDER — PREDNISOLONE ACETATE 10 MG/ML
SUSPENSION/ DROPS OPHTHALMIC
COMMUNITY
Start: 2019-03-28 | End: 2023-07-20

## 2019-07-01 RX ORDER — KETOROLAC TROMETHAMINE 4 MG/ML
1 SOLUTION/ DROPS OPHTHALMIC 3 TIMES DAILY
COMMUNITY
Start: 2019-03-08

## 2019-07-01 RX ORDER — ACETAMINOPHEN 500 MG
1000 TABLET ORAL EVERY 4 HOURS PRN
Status: ON HOLD | COMMUNITY
Start: 2018-06-13 | End: 2019-07-21

## 2019-07-01 ASSESSMENT — MIFFLIN-ST. JEOR: SCORE: 1215.55

## 2019-07-01 ASSESSMENT — PAIN SCALES - GENERAL: PAINLEVEL: MODERATE PAIN (5)

## 2019-07-01 NOTE — PROGRESS NOTES
Urology Clinic    Akhil Causey MD  Date of Service: 2019     Name: Karo Lindsay  MRN: [id number]  Age: 49 year old  : 1970  Referring provider: Referred Self         Assessment and Plan:  Persistent moderate left hydroureteronephrosis of a solitary kidney and recurrent UTI in setting of ileal conduit urinary diversion.      At this point, the patient is not tolerating nonoperative management would like to proceed with ileal conduit revision.  We discussed that we could start the surgery with an attempt at looposcopy to see how long the stenosis is but that I suspect it is long enough to require replacement of the entire conduit.  Even if we are able to get away without resection of the entire conduit I expect this will require a laparotomy and cannot just be done with the peristomal incision..    We discussed the risk to include but not be limited to bleeding, infection, parastomal hernia, bowel obstruction and intestinal leak.  She would like to proceed.    Martínez Reilly MD, Urology Resident    =======================================================  As the attending surgeon I, Akhil Causey, interviewed and examined the patient. The plan was developed between me and the patient. My findings and plan are as stated by the resident.    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 Mx Lindsay was born with cloacal extrophy and had a cystectomy, end colostomy (age 1) and ileal conduit. She had a normally positioned left kidney and a right pelvic kidney. She did well until the age of 17 years when she had trouble with recurrent UTIs and nephrolithiasis with high fevers, malaise, nausea and vomiting resulting in hospitalizations to treat half  of her UTI episodes. She had multiple stone procedures. A MAG3 renogram revealed 13% function of her right kidney. A right nephrectomy was performed in 2005.     She continued to have occasional UTIs. She has been followed in Nephrology by Dr. Tucker Holder. She has been seen in Urology at [place] for loopograms under Dr. Sunil Causey. 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     I last saw the patient on 05/13/2019 during which time she presented with recurrent UTI's and persistent moderate left hydroureteronephrosis of a solitary kidney. Loopogram (05/02/2019) reveals stenosis throughout at least the distal half of the conduit with associated reflux into a dilated left upper tract. Ms. Lindsay notes that this was quite difficult and that they were not able to get an 8 Fr, but could pass a 4 Fr in the most distal aspect in order to perform the loopogram.    In light of the findings on extensive stenosis of the conduit, I explained that stomal revision would be of no utility.  Instead, patient will require conduit revision -- I recommend we start with circumferential dissection of the stoma at the skin level down to the fascia followed by midline laparotomy. After resecting the diseased section we would then scope her remaining proximal end of the conduit and decide whether we do one of three options for recosntruction:    1. Mature the proximal end to the skin  2. Add a section of ileum as a bridge to the skin  3. Resect the remainder of the conduit and built a completely new conduit.     We did discuss that some patients can be managed with a nephroureteral stent across the stenotic conduit but that given her long life expectancy and solitary kidney I recommended against that.      At that time, she did not wish to proceed with surgery in the next couple of months, and I recommend a PNT until surgery. I stressed the importance of an aggressive approach to her  problem in light of the solitary kidney. She was hesitant to proceed because her Cr was normal and she thought she only needed antibiotic therapy for the UTIs.     The patient then met with Dr. Jon Jaime at the [facility] for a second opinion. Dr. Jaime saw the patient on 06/04/2019 and per chart review discussed balloon dilation of the conduit with placement of a catheter as an alternative to nephrostomy tube placement. He noted this would required daily irrigation and frequent catheter exchange and is only to serve is a temporizing measure until she can tolerate more formal reconstruction.    Today, the patient presents after the nephroureteral stent was placed at [facility].  She has not tolerated it well.  She has had constant aching pain in the left kidney since placement about 3 weeks ago.  She is in tears today and would like to proceed with revision of her ileal conduit.  We discussed the option of placing a nephrostomy tube instead as a bridge to surgery and so that she could wait until the fall for surgery as she previously desired but at this point she really just wants to proceed with surgery soon as possible and avoid the nephrostomy tube.    She had called me about her pain last week and we ordered a renal ultrasound.  This shows the same amount of moderate hydronephrosis as before the stent.  So I do not think that there is any acute obstruction that needs urgent correction within the next few days but I am concerned that the persistent pain indicates some ongoing minor obstruction.    Review of Systems:   Pertinent items are noted in HPI or as below, remainder of complete ROS is negative.      Physical Exam:   There were no vitals taken for this visit.   There is no height or weight on file to calculate BMI.  General: Alert, no acute distress, oriented  HENT: Good dentition  Lungs: No respiratory distress, or pursed lip breathing  Heart: No obvious jugular venous distension present  Abdomen: Soft,  nontender, no organomegaly or masses, stoma appears pink and viable but narrow and it has a stent exiting from it currently, colostomy with adequate output ; both ostomies are at the same level on her upper abdomen and she would like to keep them both there  Musculoskeletal: Extremities normal, no peripheral edema  Skin: No suspicious lesions or rashes ; no skin breakdown on her abdomen or around the stomas    Laboratory:   I reviewed all applicable laboratory and pathology data and went over findings with patient  Significant for     Lab Results   Component Value Date    CR 0.87 04/15/2019    CR 0.89 04/27/2018    CR 0.83 04/26/2018    CR 0.95 04/25/2018    CR 1.14 04/24/2018     Imaging:   I personally reviewed all applicable imaging and went over the below findings with patient.    Results for orders placed or performed in visit on 06/28/19   US Renal Complete    Narrative    EXAMINATION: US RENAL COMPLETE, 6/28/2019 1:57 PM     COMPARISON: Loopogram and  nephrostogram dated 5/2/2019    HISTORY: Right flank pain    FINDINGS:    Right kidney: Surgically absent     Left kidney: Measures 11.7 cm in length. Parenchyma is of normal  thickness and echogenicity. Moderate hydronephrosis. Ureteral stent is  partially visualized. Simple appearing cyst in upper pole of the left  kidney measuring 2.4 x 2.2 x 1.6 cm      Bladder: Surgically absent    Hepatic steatosis incidentally seen.        Impression    IMPRESSION:   1. Moderate left sided hydronephrosis.  2. Partial visualization of left ureteral stent.    I have personally reviewed the examination and initial interpretation  and I agree with the findings.    MARIE FALK MD     CC  No care team member to display  SELF, REFERRED    Copy to patient  ROSA ELENA WILKERSON ARMEN  [address] [place] [id number]

## 2019-07-01 NOTE — PROGRESS NOTES
Pre Op Teaching Flowsheet       Pre and Post op Patient Education  Relevant Diagnosis:  urinary conduit stricture  Teaching Topic:  Pre and post op teaching for revision or replacement of urinary conduit, looposcopy  Person Involved in teaching:  Karo Lindsay      Motivation Level:  Asks Questions: Yes  Eager to Learn:  Yes  Cooperative: Yes  Receptive (willing/able to accept information):  Yes  Patient demonstrates understanding of the following:  Date and time of surgery:  7/10/19  Location of surgery: [address] [place] [address]  History and Physical and any other testing necessary prior to surgery: Yes  Required time line for completion of History and Physical and any pre-op testing: Yes    NPO Guidelines: NPO per Anesthesia Guidelines    Patient demonstrates understanding of the following:  Patient understands the need for a responsible adult to drive them home and someone to stay with them for the first 24 hours post-operatively: Patient staying 7 days inpatient  Pre-op bowel prep: Yes  Pre-op showering/scrub information with Hibiclens Soap: Yes  Medications to take the day of surgery:  Per PCP  Blood thinner medications discussed and when to stop (if applicable):  Yes  Diabetes medication management (if applicable):  Patient to discuss with Primary Care Provider  Discussed pain control after surgery: pain scale, pain medications and pain management techniques  Infection Prevention: Patient demonstrates understanding of the following:  Patient instructed on hand hygiene:  Yes  Surgical procedure site care taught: Yes  Signs and symptoms of infection taught:  Yes  Wound care will be taught at the time of discharge.  Central venous catheter care will be taught at the time of discharge (if applicable).    Post-op follow-up:  Discussed how to contact the hospital, nurse, and clinic scheduling staff if necessary.    Instructional materials used/given/mailed:  [facility] Booklet, post op teaching sheet, Map,  Soap, and arrival/location information.    Surgical instructions given to patient in clinic: Yes.    Instructional Materials given:  Before your surgery packet , Medications to avoid before surgery , Showering or Bathing instructions before surgery  and What to expect after surgery    Post-op appointment/testing scheduled per MD orders: Yes    Total time with patient: 10 minutes    Vivien Sloan RN, BSN  Urology Care Coordinator

## 2019-07-01 NOTE — NURSING NOTE
"Chief Complaint   Patient presents with     RECHECK     Hydronephrosis       Blood pressure 122/80, pulse 98, height 1.473 m (4' 10\"), weight 70.6 kg (155 lb 9.6 oz). Body mass index is 32.52 kg/m .    There is no problem list on file for this patient.      Allergies   Allergen Reactions     Moxifloxacin Hives     Contrast Dye Other (See Comments)     \"I am not supposed to have because I only have one kidney.\"     Propoxyphene N-Apap Nausea and GI Disturbance     vomiting  vomiting       Penicillins Rash       Current Outpatient Medications   Medication Sig Dispense Refill     acetaminophen (TYLENOL) 500 MG tablet Take 1,000 mg by mouth       ketorolac tromethamine (ACULAR-LS) 0.4 % SOLN ophthalmic solution        LORazepam (ATIVAN) 0.5 MG tablet Take 0.5 mg by mouth as needed       prednisoLONE acetate (PRED FORTE) 1 % ophthalmic suspension USE 1 DROP RIGHT EYE 4X/DAY. START 1 DAY PRIOR TO SURGERY AND USE UNTIL INSTRUCTED TO DISCONTINUE.       rizatriptan (MAXALT) 10 MG tablet Take by mouth as needed         Social History     Tobacco Use     Smoking status: Never Smoker     Smokeless tobacco: Never Used   Substance Use Topics     Alcohol use: None     Drug use: None       CLAYTON Rodriguez  7/1/2019  7:05 AM     "

## 2019-07-01 NOTE — LETTER
2019       RE: Karo Lindsay  [address] [facility] [address]     Dear Colleague,    Thank you for referring your patient, Karo Lindsay, to the [facility] UROLOGY AND [facility] FOR PROSTATE AND UROLOGIC CANCERS at [facility]. Please see a copy of my visit note below.      Urology Clinic    Akhil Causey MD  Date of Service: 2019     Name: Karo Lindsay  MRN: [id number]  Age: 49 year old  : 1970  Referring provider: Referred Self         Assessment and Plan:  Persistent moderate left hydroureteronephrosis of a solitary kidney and recurrent UTI in setting of ileal conduit urinary diversion.      At this point, the patient is not tolerating nonoperative management would like to proceed with ileal conduit revision.  We discussed that we could start the surgery with an attempt at looposcopy to see how long the stenosis is but that I suspect it is long enough to require replacement of the entire conduit.  Even if we are able to get away without resection of the entire conduit I expect this will require a laparotomy and cannot just be done with the peristomal incision..    We discussed the risk to include but not be limited to bleeding, infection, parastomal hernia, bowel obstruction and intestinal leak.  She would like to proceed.    Martínez Reilly MD, Urology Resident    =======================================================  As the attending surgeon I, Akhil Causey, interviewed and examined the patient. The plan was developed between me and the patient. My findings and plan are as stated by the resident.    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 She had a normally positioned left kidney and a right pelvic kidney. She did well until the age of 17 years when she had trouble with recurrent UTIs and nephrolithiasis with high fevers, malaise, nausea and vomiting resulting in hospitalizations to treat half of her UTI episodes. She had multiple stone procedures. A MAG3 renogram revealed 13% function of her right kidney. A right nephrectomy was performed in 2005.     She continued to have occasional UTIs. She has been followed in Nephrology by Dr. Tucker Holder. She has been seen in Urology at [facility] for loopograms under Dr. Sunil Causey. 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     I last saw the patient on 05/13/2019 during which time she presented with recurrent UTI's and persistent moderate left hydroureteronephrosis of a solitary kidney. Loopogram (05/02/2019) reveals stenosis throughout at least the distal half of the conduit with associated reflux into a dilated left upper tract. Ms. Lindsay notes that this was quite difficult and that they were not able to get an 8 Fr, but could pass a 4 Fr in the most distal aspect in order to perform the loopogram.    In light of the findings on extensive stenosis of the conduit, I explained that stomal revision would be of no utility.  Instead, patient will require conduit revision -- I recommend we start with circumferential dissection of the stoma at the skin level down to the fascia followed by midline laparotomy. After resecting the diseased section we would then scope her remaining proximal end of the conduit and decide whether we do one of three options for recosntruction:    1. Mature the proximal end to the skin  2. Add a section of ileum as a bridge to the skin  3. Resect the remainder of the conduit and built a completely new conduit.     We did discuss that some patients can be managed with a  nephroureteral stent across the stenotic conduit but that given her long life expectancy and solitary kidney I recommended against that.      At that time, she did not wish to proceed with surgery in the next couple of months, and I recommend a PNT until surgery. I stressed the importance of an aggressive approach to her problem in light of the solitary kidney. She was hesitant to proceed because her Cr was normal and she thought she only needed antibiotic therapy for the UTIs.     The patient then met with Dr. Jon Jaime at the [facility] for a second opinion. Dr. Jaime saw the patient on 06/04/2019 and per chart review discussed balloon dilation of the conduit with placement of a catheter as an alternative to nephrostomy tube placement. He noted this would required daily irrigation and frequent catheter exchange and is only to serve is a temporizing measure until she can tolerate more formal reconstruction.    Today, the patient presents after the nephroureteral stent was placed at [facility].  She has not tolerated it well.  She has had constant aching pain in the left kidney since placement about 3 weeks ago.  She is in tears today and would like to proceed with revision of her ileal conduit.  We discussed the option of placing a nephrostomy tube instead as a bridge to surgery and so that she could wait until the fall for surgery as she previously desired but at this point she really just wants to proceed with surgery soon as possible and avoid the nephrostomy tube.    She had called me about her pain last week and we ordered a renal ultrasound.  This shows the same amount of moderate hydronephrosis as before the stent.  So I do not think that there is any acute obstruction that needs urgent correction within the next few days but I am concerned that the persistent pain indicates some ongoing minor obstruction.    Review of Systems:   Pertinent items are noted in HPI or as below, remainder of complete ROS is  negative.      Physical Exam:   There were no vitals taken for this visit.   There is no height or weight on file to calculate BMI.  General: Alert, no acute distress, oriented  HENT: Good dentition  Lungs: No respiratory distress, or pursed lip breathing  Heart: No obvious jugular venous distension present  Abdomen: Soft, nontender, no organomegaly or masses, stoma appears pink and viable but narrow and it has a stent exiting from it currently, colostomy with adequate output ; both ostomies are at the same level on her upper abdomen and she would like to keep them both there  Musculoskeletal: Extremities normal, no peripheral edema  Skin: No suspicious lesions or rashes ; no skin breakdown on her abdomen or around the stomas    Laboratory:   I reviewed all applicable laboratory and pathology data and went over findings with patient  Significant for     Lab Results   Component Value Date    CR 0.87 04/15/2019    CR 0.89 04/27/2018    CR 0.83 04/26/2018    CR 0.95 04/25/2018    CR 1.14 04/24/2018     Imaging:   I personally reviewed all applicable imaging and went over the below findings with patient.    Results for orders placed or performed in visit on 06/28/19   US Renal Complete    Narrative    EXAMINATION: US RENAL COMPLETE, 6/28/2019 1:57 PM     COMPARISON: Loopogram and  nephrostogram dated 5/2/2019    HISTORY: Right flank pain    FINDINGS:    Right kidney: Surgically absent     Left kidney: Measures 11.7 cm in length. Parenchyma is of normal  thickness and echogenicity. Moderate hydronephrosis. Ureteral stent is  partially visualized. Simple appearing cyst in upper pole of the left  kidney measuring 2.4 x 2.2 x 1.6 cm      Bladder: Surgically absent    Hepatic steatosis incidentally seen.        Impression    IMPRESSION:   1. Moderate left sided hydronephrosis.  2. Partial visualization of left ureteral stent.    I have personally reviewed the examination and initial interpretation  and I agree with the  findings.    MARIE FALK MD     CC  No care team member to display  SELF, REFERRED    Copy to patient  ROSA ELENA AYERS  [address] [place] [id number]      Again, thank you for allowing me to participate in the care of your patient.      Sincerely,    Akhil Causey MD

## 2019-07-07 DIAGNOSIS — R82.79 URINE CULTURE POSITIVE: Primary | ICD-10-CM

## 2019-07-07 DIAGNOSIS — T85.858A STENOSIS OF ILEAL CONDUIT STOMA, INITIAL ENCOUNTER: ICD-10-CM

## 2019-07-07 DIAGNOSIS — N31.9 NEUROGENIC BLADDER: ICD-10-CM

## 2019-07-07 RX ORDER — NITROFURANTOIN 25; 75 MG/1; MG/1
100 CAPSULE ORAL 2 TIMES DAILY
Qty: 6 CAPSULE | Refills: 0 | Status: ON HOLD | OUTPATIENT
Start: 2019-07-07 | End: 2019-07-11

## 2019-07-07 NOTE — PROGRESS NOTES
Urine culture from 7/3 growing citrobacter and enterococcus. Macrobid 3 days called to patients pharmacy to start tonight.

## 2019-07-08 ENCOUNTER — TELEPHONE (OUTPATIENT)
Dept: UROLOGY | Facility: CLINIC | Age: 49
End: 2019-07-08

## 2019-07-08 ENCOUNTER — ANESTHESIA EVENT (OUTPATIENT)
Dept: SURGERY | Facility: CLINIC | Age: 49
End: 2019-07-08
Payer: COMMERCIAL

## 2019-07-08 DIAGNOSIS — N31.9 NEUROGENIC BLADDER: Primary | ICD-10-CM

## 2019-07-08 RX ORDER — NITROFURANTOIN MACROCRYSTAL 100 MG
100 CAPSULE ORAL 2 TIMES DAILY
COMMUNITY
End: 2019-07-08

## 2019-07-08 NOTE — OR NURSING
Pt asked if you could arrange an Ostomy Nurse.   Received: Today   Message Contents   Shantelle Howell RN Elliott, Sean Patrick, MD   Cc: Vivien Sloan RN             Hi Dr. Mondragon and Vivien,     Pre-op call done with pt who said she will need the stoma nurse to come by for help with appliances for her 2 ostomies, while she is in the hospital. Can you please order this?     Thanks.     Shantelle Howell RN, BSN   [facility] Preadmission Nursing   [phone]

## 2019-07-08 NOTE — TELEPHONE ENCOUNTER
Spoke to patient to let her know she doesn't need a bowel prep per Dr. Causey.    Vivien Sloan, RN, BSN  Care Coordinator- Reconstructive Urology

## 2019-07-08 NOTE — OR NURSING
RE: Pt asked if you could arrange an Ostomy Nurse.   Received: Today   Message Contents   Shantelle Howell, Vivien Velasco, RN             The pt told me she needed an ostomy nurse to help her with her appliances and wanted you or Dr Mondragon to arrange this. I explained I don't work directly with Dr Mondragon and that she should ask you, she said she just wanted me to let you know. I thought she needed an order for this but if not, could you please help arrange this or call pt to see exactly what she needs?     Thanks so much.     Shantelle    Previous Messages      ----- Message -----   From: Vivien Sloan, RN   Sent: 7/8/2019   2:34 PM   To: Akhil Causey MD, Shantelle Howell RN   Subject: RE: Pt asked if you could arrange an Ostomy *     Sixto Pepper,     To my knowledge and the rest of the RN's here we aren't aware of orders being placed when patients stay inpatient with ostomies.  Is there something in particular you're referencing?     Vivien Beaulieu   ----- Message -----   From: Shantelle Howell RN   Sent: 7/8/2019   2:26 PM   To: Akhil Causey MD, Vivien Sloan RN   Subject: Pt asked if you could arrange an Ostomy Nurs*     Hi Dr. Mondragon and Vivien,     Pre-op call done with pt who said she will need the stoma nurse to come by for help with appliances for her 2 ostomies, while she is in the hospital. Can you please order this?     Thanks.     Shantelle Howell, RN, BSN   [facility] Preadmission Nursing   [phone]

## 2019-07-08 NOTE — TELEPHONE ENCOUNTER
Health Call Center    Phone Message    May a detailed message be left on voicemail: yes    Reason for Call: Other: Pt requests call back this morning - has question for her pre surgery prep - Pt was given items to start today to clean herself out for surgery - but Pt wants Dr Mondragon to know she does not have a colon - and wondering if she still has to use those items for prep or not - please return her call either way - Thanks!     Action Taken: Message routed to:  [facility] Urology Clinic

## 2019-07-09 ENCOUNTER — HOSPITAL ENCOUNTER (OUTPATIENT)
Dept: VASCULAR ULTRASOUND | Facility: CLINIC | Age: 49
Discharge: HOME | End: 2019-07-09
Attending: UROLOGY | Admitting: UROLOGY
Payer: COMMERCIAL

## 2019-07-09 ENCOUNTER — HOSPITAL ENCOUNTER (OUTPATIENT)
Dept: GENERAL RADIOLOGY | Facility: CLINIC | Age: 49
End: 2019-07-09
Attending: UROLOGY
Payer: COMMERCIAL

## 2019-07-09 ENCOUNTER — ANESTHESIA (OUTPATIENT)
Dept: VASCULAR ULTRASOUND | Facility: CLINIC | Age: 49
End: 2019-07-09

## 2019-07-09 DIAGNOSIS — N31.9 NEUROGENIC BLADDER: ICD-10-CM

## 2019-07-09 PROCEDURE — 27211389 ZZ H KIT, 5 FR DL BIOFLO OPEN ENDED PICC

## 2019-07-09 PROCEDURE — 36569 INSJ PICC 5 YR+ W/O IMAGING: CPT

## 2019-07-09 PROCEDURE — 25000125 ZZHC RX 250: Performed by: UROLOGY

## 2019-07-09 PROCEDURE — 27211414 ZZ H ADHESIVE SKIN CLOSURE, DERMABOND

## 2019-07-09 PROCEDURE — 40000986 XR CHEST 1 VW

## 2019-07-09 RX ORDER — HEPARIN SODIUM,PORCINE 10 UNIT/ML
2-5 VIAL (ML) INTRAVENOUS
Status: DISCONTINUED | OUTPATIENT
Start: 2019-07-09 | End: 2019-07-09

## 2019-07-09 RX ORDER — LIDOCAINE 40 MG/G
CREAM TOPICAL
Status: DISCONTINUED | OUTPATIENT
Start: 2019-07-09 | End: 2019-07-10 | Stop reason: HOSPADM

## 2019-07-09 RX ORDER — HEPARIN SODIUM,PORCINE 10 UNIT/ML
2-5 VIAL (ML) INTRAVENOUS
Status: DISCONTINUED | OUTPATIENT
Start: 2019-07-09 | End: 2019-07-10 | Stop reason: HOSPADM

## 2019-07-09 RX ORDER — LIDOCAINE 40 MG/G
CREAM TOPICAL
Status: DISCONTINUED | OUTPATIENT
Start: 2019-07-09 | End: 2019-07-09

## 2019-07-09 RX ADMIN — LIDOCAINE HYDROCHLORIDE 1 ML: 10 INJECTION, SOLUTION EPIDURAL; INFILTRATION; INTRACAUDAL; PERINEURAL at 11:01

## 2019-07-10 ENCOUNTER — HOSPITAL ENCOUNTER (INPATIENT)
Facility: CLINIC | Age: 49
LOS: 11 days | Discharge: HOME HEALTH | End: 2019-07-21
Attending: UROLOGY | Admitting: UROLOGY
Payer: COMMERCIAL

## 2019-07-10 ENCOUNTER — APPOINTMENT (OUTPATIENT)
Dept: GENERAL RADIOLOGY | Facility: CLINIC | Age: 49
End: 2019-07-10
Attending: UROLOGY
Payer: COMMERCIAL

## 2019-07-10 ENCOUNTER — TELEPHONE (OUTPATIENT)
Dept: UROLOGY | Facility: CLINIC | Age: 49
End: 2019-07-10

## 2019-07-10 ENCOUNTER — ANESTHESIA (OUTPATIENT)
Dept: SURGERY | Facility: CLINIC | Age: 49
End: 2019-07-10
Payer: COMMERCIAL

## 2019-07-10 DIAGNOSIS — Z93.6 S/P ILEAL CONDUIT (H): ICD-10-CM

## 2019-07-10 DIAGNOSIS — T85.858A STENOSIS OF ILEAL CONDUIT STOMA, INITIAL ENCOUNTER: Primary | ICD-10-CM

## 2019-07-10 LAB
ANION GAP SERPL CALCULATED.3IONS-SCNC: 14 MMOL/L (ref 3–14)
BUN SERPL-MCNC: 15 MG/DL (ref 7–30)
CALCIUM SERPL-MCNC: 8.1 MG/DL (ref 8.5–10.1)
CHLORIDE SERPL-SCNC: 108 MMOL/L (ref 94–109)
CO2 SERPL-SCNC: 17 MMOL/L (ref 20–32)
CREAT SERPL-MCNC: 0.93 MG/DL (ref 0.52–1.04)
ERYTHROCYTE [DISTWIDTH] IN BLOOD BY AUTOMATED COUNT: 13.2 % (ref 10–15)
GFR SERPL CREATININE-BSD FRML MDRD: 71 ML/MIN/{1.73_M2}
GLUCOSE BLDC GLUCOMTR-MCNC: 103 MG/DL (ref 70–99)
GLUCOSE SERPL-MCNC: 200 MG/DL (ref 70–99)
HCT VFR BLD AUTO: 38.6 % (ref 35–47)
HGB BLD-MCNC: 12.2 G/DL (ref 11.7–15.7)
LACTATE BLD-SCNC: 6.9 MMOL/L (ref 0.7–2)
MCH RBC QN AUTO: 28.8 PG (ref 26.5–33)
MCHC RBC AUTO-ENTMCNC: 31.6 G/DL (ref 31.5–36.5)
MCV RBC AUTO: 91 FL (ref 78–100)
PLATELET # BLD AUTO: 285 10E9/L (ref 150–450)
POTASSIUM SERPL-SCNC: 4 MMOL/L (ref 3.4–5.3)
RBC # BLD AUTO: 4.23 10E12/L (ref 3.8–5.2)
SODIUM SERPL-SCNC: 140 MMOL/L (ref 133–144)
WBC # BLD AUTO: 18.3 10E9/L (ref 4–11)

## 2019-07-10 PROCEDURE — 12000001 ZZH R&B MED SURG/OB UMMC

## 2019-07-10 PROCEDURE — 25000125 ZZHC RX 250: Performed by: STUDENT IN AN ORGANIZED HEALTH CARE EDUCATION/TRAINING PROGRAM

## 2019-07-10 PROCEDURE — 25800030 ZZH RX IP 258 OP 636: Performed by: NURSE ANESTHETIST, CERTIFIED REGISTERED

## 2019-07-10 PROCEDURE — 36000068 ZZH SURGERY LEVEL 5 1ST 30 MIN - UMMC: Performed by: UROLOGY

## 2019-07-10 PROCEDURE — 25000125 ZZHC RX 250: Performed by: ANESTHESIOLOGY

## 2019-07-10 PROCEDURE — 25000128 H RX IP 250 OP 636: Performed by: ANESTHESIOLOGY

## 2019-07-10 PROCEDURE — 0TN70ZZ RELEASE LEFT URETER, OPEN APPROACH: ICD-10-PCS | Performed by: UROLOGY

## 2019-07-10 PROCEDURE — C2617 STENT, NON-COR, TEM W/O DEL: HCPCS | Performed by: UROLOGY

## 2019-07-10 PROCEDURE — 40000171 ZZH STATISTIC PRE-PROCEDURE ASSESSMENT III: Performed by: UROLOGY

## 2019-07-10 PROCEDURE — 25000128 H RX IP 250 OP 636: Performed by: STUDENT IN AN ORGANIZED HEALTH CARE EDUCATION/TRAINING PROGRAM

## 2019-07-10 PROCEDURE — 25000125 ZZHC RX 250: Performed by: NURSE ANESTHETIST, CERTIFIED REGISTERED

## 2019-07-10 PROCEDURE — 40000985 XR ABDOMEN PORT 1 VW

## 2019-07-10 PROCEDURE — 85027 COMPLETE CBC AUTOMATED: CPT | Performed by: STUDENT IN AN ORGANIZED HEALTH CARE EDUCATION/TRAINING PROGRAM

## 2019-07-10 PROCEDURE — 37000009 ZZH ANESTHESIA TECHNICAL FEE, EACH ADDTL 15 MIN: Performed by: UROLOGY

## 2019-07-10 PROCEDURE — 25000128 H RX IP 250 OP 636: Performed by: NURSE ANESTHETIST, CERTIFIED REGISTERED

## 2019-07-10 PROCEDURE — 86850 RBC ANTIBODY SCREEN: CPT | Performed by: ANESTHESIOLOGY

## 2019-07-10 PROCEDURE — 25800030 ZZH RX IP 258 OP 636: Performed by: STUDENT IN AN ORGANIZED HEALTH CARE EDUCATION/TRAINING PROGRAM

## 2019-07-10 PROCEDURE — 0D1B0Z4 BYPASS ILEUM TO CUTANEOUS, OPEN APPROACH: ICD-10-PCS | Performed by: UROLOGY

## 2019-07-10 PROCEDURE — 83605 ASSAY OF LACTIC ACID: CPT | Performed by: UROLOGY

## 2019-07-10 PROCEDURE — 25800030 ZZH RX IP 258 OP 636: Performed by: ANESTHESIOLOGY

## 2019-07-10 PROCEDURE — 37000008 ZZH ANESTHESIA TECHNICAL FEE, 1ST 30 MIN: Performed by: UROLOGY

## 2019-07-10 PROCEDURE — 71000015 ZZH RECOVERY PHASE 1 LEVEL 2 EA ADDTL HR: Performed by: UROLOGY

## 2019-07-10 PROCEDURE — 36415 COLL VENOUS BLD VENIPUNCTURE: CPT | Performed by: STUDENT IN AN ORGANIZED HEALTH CARE EDUCATION/TRAINING PROGRAM

## 2019-07-10 PROCEDURE — 71000014 ZZH RECOVERY PHASE 1 LEVEL 2 FIRST HR: Performed by: UROLOGY

## 2019-07-10 PROCEDURE — 00000146 ZZHCL STATISTIC GLUCOSE BY METER IP

## 2019-07-10 PROCEDURE — C1769 GUIDE WIRE: HCPCS | Performed by: UROLOGY

## 2019-07-10 PROCEDURE — 80048 BASIC METABOLIC PNL TOTAL CA: CPT | Performed by: STUDENT IN AN ORGANIZED HEALTH CARE EDUCATION/TRAINING PROGRAM

## 2019-07-10 PROCEDURE — 36000070 ZZH SURGERY LEVEL 5 EA 15 ADDTL MIN - UMMC: Performed by: UROLOGY

## 2019-07-10 PROCEDURE — 86901 BLOOD TYPING SEROLOGIC RH(D): CPT | Performed by: ANESTHESIOLOGY

## 2019-07-10 PROCEDURE — 86923 COMPATIBILITY TEST ELECTRIC: CPT | Performed by: ANESTHESIOLOGY

## 2019-07-10 PROCEDURE — 86900 BLOOD TYPING SEROLOGIC ABO: CPT | Performed by: ANESTHESIOLOGY

## 2019-07-10 PROCEDURE — 25000566 ZZH SEVOFLURANE, EA 15 MIN: Performed by: UROLOGY

## 2019-07-10 PROCEDURE — 83605 ASSAY OF LACTIC ACID: CPT

## 2019-07-10 PROCEDURE — 0T1807C BYPASS BILATERAL URETERS TO ILEOCUTANEOUS WITH AUTOLOGOUS TISSUE SUBSTITUTE, OPEN APPROACH: ICD-10-PCS | Performed by: UROLOGY

## 2019-07-10 PROCEDURE — 0DNE0ZZ RELEASE LARGE INTESTINE, OPEN APPROACH: ICD-10-PCS | Performed by: UROLOGY

## 2019-07-10 PROCEDURE — 25000128 H RX IP 250 OP 636: Performed by: UROLOGY

## 2019-07-10 PROCEDURE — 88307 TISSUE EXAM BY PATHOLOGIST: CPT | Performed by: UROLOGY

## 2019-07-10 PROCEDURE — 27210794 ZZH OR GENERAL SUPPLY STERILE: Performed by: UROLOGY

## 2019-07-10 DEVICE — STENT URINARY DIVERSION PERCFLEX SET 7FRX80CM M0061602100: Type: IMPLANTABLE DEVICE | Site: KIDNEY | Status: FUNCTIONAL

## 2019-07-10 RX ORDER — SODIUM CHLORIDE, SODIUM LACTATE, POTASSIUM CHLORIDE, CALCIUM CHLORIDE 600; 310; 30; 20 MG/100ML; MG/100ML; MG/100ML; MG/100ML
INJECTION, SOLUTION INTRAVENOUS CONTINUOUS
Status: DISCONTINUED | OUTPATIENT
Start: 2019-07-10 | End: 2019-07-10 | Stop reason: HOSPADM

## 2019-07-10 RX ORDER — ACETAMINOPHEN 500 MG
1000 TABLET ORAL ONCE
Status: DISCONTINUED | OUTPATIENT
Start: 2019-07-10 | End: 2019-07-10 | Stop reason: HOSPADM

## 2019-07-10 RX ORDER — FENTANYL CITRATE 50 UG/ML
25-50 INJECTION, SOLUTION INTRAMUSCULAR; INTRAVENOUS
Status: DISCONTINUED | OUTPATIENT
Start: 2019-07-10 | End: 2019-07-10 | Stop reason: HOSPADM

## 2019-07-10 RX ORDER — HYDROMORPHONE HYDROCHLORIDE 1 MG/ML
.3-.5 INJECTION, SOLUTION INTRAMUSCULAR; INTRAVENOUS; SUBCUTANEOUS EVERY 5 MIN PRN
Status: DISCONTINUED | OUTPATIENT
Start: 2019-07-10 | End: 2019-07-10 | Stop reason: HOSPADM

## 2019-07-10 RX ORDER — ONDANSETRON 4 MG/1
4 TABLET, ORALLY DISINTEGRATING ORAL EVERY 6 HOURS PRN
Status: DISCONTINUED | OUTPATIENT
Start: 2019-07-10 | End: 2019-07-10

## 2019-07-10 RX ORDER — ONDANSETRON 4 MG/1
4 TABLET, ORALLY DISINTEGRATING ORAL EVERY 30 MIN PRN
Status: DISCONTINUED | OUTPATIENT
Start: 2019-07-10 | End: 2019-07-10 | Stop reason: HOSPADM

## 2019-07-10 RX ORDER — PROPOFOL 10 MG/ML
INJECTION, EMULSION INTRAVENOUS CONTINUOUS PRN
Status: DISCONTINUED | OUTPATIENT
Start: 2019-07-10 | End: 2019-07-10

## 2019-07-10 RX ORDER — GABAPENTIN 300 MG/1
300 CAPSULE ORAL ONCE
Status: DISCONTINUED | OUTPATIENT
Start: 2019-07-10 | End: 2019-07-10 | Stop reason: HOSPADM

## 2019-07-10 RX ORDER — SCOLOPAMINE TRANSDERMAL SYSTEM 1 MG/1
PATCH, EXTENDED RELEASE TRANSDERMAL PRN
Status: DISCONTINUED | OUTPATIENT
Start: 2019-07-10 | End: 2019-07-10

## 2019-07-10 RX ORDER — FLUMAZENIL 0.1 MG/ML
0.2 INJECTION, SOLUTION INTRAVENOUS
Status: DISCONTINUED | OUTPATIENT
Start: 2019-07-10 | End: 2019-07-10 | Stop reason: HOSPADM

## 2019-07-10 RX ORDER — ONDANSETRON 2 MG/ML
4 INJECTION INTRAMUSCULAR; INTRAVENOUS EVERY 6 HOURS PRN
Status: DISCONTINUED | OUTPATIENT
Start: 2019-07-10 | End: 2019-07-10

## 2019-07-10 RX ORDER — ESMOLOL HYDROCHLORIDE 10 MG/ML
INJECTION INTRAVENOUS PRN
Status: DISCONTINUED | OUTPATIENT
Start: 2019-07-10 | End: 2019-07-10

## 2019-07-10 RX ORDER — ONDANSETRON 2 MG/ML
4 INJECTION INTRAMUSCULAR; INTRAVENOUS EVERY 30 MIN PRN
Status: DISCONTINUED | OUTPATIENT
Start: 2019-07-10 | End: 2019-07-10 | Stop reason: HOSPADM

## 2019-07-10 RX ORDER — ERTAPENEM 1 G/1
1 INJECTION, POWDER, LYOPHILIZED, FOR SOLUTION INTRAMUSCULAR; INTRAVENOUS EVERY 24 HOURS
Status: DISCONTINUED | OUTPATIENT
Start: 2019-07-10 | End: 2019-07-10 | Stop reason: HOSPADM

## 2019-07-10 RX ORDER — FENTANYL CITRATE 50 UG/ML
INJECTION, SOLUTION INTRAMUSCULAR; INTRAVENOUS PRN
Status: DISCONTINUED | OUTPATIENT
Start: 2019-07-10 | End: 2019-07-10

## 2019-07-10 RX ORDER — NALBUPHINE HYDROCHLORIDE 10 MG/ML
2.5-5 INJECTION, SOLUTION INTRAMUSCULAR; INTRAVENOUS; SUBCUTANEOUS EVERY 6 HOURS PRN
Status: DISCONTINUED | OUTPATIENT
Start: 2019-07-10 | End: 2019-07-10

## 2019-07-10 RX ORDER — METOCLOPRAMIDE HYDROCHLORIDE 5 MG/ML
10 INJECTION INTRAMUSCULAR; INTRAVENOUS EVERY 6 HOURS PRN
Status: DISCONTINUED | OUTPATIENT
Start: 2019-07-10 | End: 2019-07-10

## 2019-07-10 RX ORDER — DEXAMETHASONE SODIUM PHOSPHATE 4 MG/ML
INJECTION, SOLUTION INTRA-ARTICULAR; INTRALESIONAL; INTRAMUSCULAR; INTRAVENOUS; SOFT TISSUE PRN
Status: DISCONTINUED | OUTPATIENT
Start: 2019-07-10 | End: 2019-07-10

## 2019-07-10 RX ORDER — METOCLOPRAMIDE 10 MG/1
10 TABLET ORAL EVERY 6 HOURS PRN
Status: DISCONTINUED | OUTPATIENT
Start: 2019-07-10 | End: 2019-07-10

## 2019-07-10 RX ORDER — SODIUM CHLORIDE, SODIUM LACTATE, POTASSIUM CHLORIDE, CALCIUM CHLORIDE 600; 310; 30; 20 MG/100ML; MG/100ML; MG/100ML; MG/100ML
INJECTION, SOLUTION INTRAVENOUS CONTINUOUS PRN
Status: DISCONTINUED | OUTPATIENT
Start: 2019-07-10 | End: 2019-07-10

## 2019-07-10 RX ORDER — PROPOFOL 10 MG/ML
INJECTION, EMULSION INTRAVENOUS PRN
Status: DISCONTINUED | OUTPATIENT
Start: 2019-07-10 | End: 2019-07-10

## 2019-07-10 RX ORDER — PROCHLORPERAZINE MALEATE 5 MG
10 TABLET ORAL EVERY 6 HOURS PRN
Status: DISCONTINUED | OUTPATIENT
Start: 2019-07-10 | End: 2019-07-10

## 2019-07-10 RX ORDER — NALOXONE HYDROCHLORIDE 0.4 MG/ML
.1-.4 INJECTION, SOLUTION INTRAMUSCULAR; INTRAVENOUS; SUBCUTANEOUS
Status: DISCONTINUED | OUTPATIENT
Start: 2019-07-10 | End: 2019-07-10

## 2019-07-10 RX ORDER — LIDOCAINE HYDROCHLORIDE AND EPINEPHRINE 15; 5 MG/ML; UG/ML
INJECTION, SOLUTION EPIDURAL PRN
Status: DISCONTINUED | OUTPATIENT
Start: 2019-07-10 | End: 2019-07-10

## 2019-07-10 RX ORDER — LABETALOL HYDROCHLORIDE 5 MG/ML
10 INJECTION, SOLUTION INTRAVENOUS
Status: COMPLETED | OUTPATIENT
Start: 2019-07-10 | End: 2019-07-10

## 2019-07-10 RX ORDER — PROCHLORPERAZINE 25 MG
25 SUPPOSITORY, RECTAL RECTAL EVERY 12 HOURS PRN
Status: DISCONTINUED | OUTPATIENT
Start: 2019-07-10 | End: 2019-07-10

## 2019-07-10 RX ORDER — NALOXONE HYDROCHLORIDE 0.4 MG/ML
.1-.4 INJECTION, SOLUTION INTRAMUSCULAR; INTRAVENOUS; SUBCUTANEOUS
Status: DISCONTINUED | OUTPATIENT
Start: 2019-07-10 | End: 2019-07-10 | Stop reason: HOSPADM

## 2019-07-10 RX ORDER — NALOXONE HYDROCHLORIDE 0.4 MG/ML
.1-.4 INJECTION, SOLUTION INTRAMUSCULAR; INTRAVENOUS; SUBCUTANEOUS
Status: DISCONTINUED | OUTPATIENT
Start: 2019-07-10 | End: 2019-07-11

## 2019-07-10 RX ADMIN — PROPOFOL 40 MG: 10 INJECTION, EMULSION INTRAVENOUS at 19:42

## 2019-07-10 RX ADMIN — FENTANYL CITRATE 50 MCG: 50 INJECTION, SOLUTION INTRAMUSCULAR; INTRAVENOUS at 14:39

## 2019-07-10 RX ADMIN — SUGAMMADEX 140 MG: 100 INJECTION, SOLUTION INTRAVENOUS at 19:51

## 2019-07-10 RX ADMIN — FENTANYL CITRATE 50 MCG: 50 INJECTION INTRAMUSCULAR; INTRAVENOUS at 12:49

## 2019-07-10 RX ADMIN — FENTANYL CITRATE 25 MCG: 50 INJECTION INTRAMUSCULAR; INTRAVENOUS at 21:26

## 2019-07-10 RX ADMIN — MIDAZOLAM 2 MG: 1 INJECTION INTRAMUSCULAR; INTRAVENOUS at 13:39

## 2019-07-10 RX ADMIN — ERTAPENEM SODIUM 1 G: 1 INJECTION, POWDER, LYOPHILIZED, FOR SOLUTION INTRAMUSCULAR; INTRAVENOUS at 14:22

## 2019-07-10 RX ADMIN — ROCURONIUM BROMIDE 50 MG: 10 INJECTION INTRAVENOUS at 13:58

## 2019-07-10 RX ADMIN — FENTANYL CITRATE 50 MCG: 50 INJECTION, SOLUTION INTRAMUSCULAR; INTRAVENOUS at 18:58

## 2019-07-10 RX ADMIN — FENTANYL CITRATE 100 MCG: 50 INJECTION, SOLUTION INTRAMUSCULAR; INTRAVENOUS at 15:00

## 2019-07-10 RX ADMIN — HYDROMORPHONE HYDROCHLORIDE 0.5 MG: 1 INJECTION, SOLUTION INTRAMUSCULAR; INTRAVENOUS; SUBCUTANEOUS at 16:10

## 2019-07-10 RX ADMIN — PROPOFOL 30 MCG/KG/MIN: 10 INJECTION, EMULSION INTRAVENOUS at 14:14

## 2019-07-10 RX ADMIN — ROCURONIUM BROMIDE 20 MG: 10 INJECTION INTRAVENOUS at 17:17

## 2019-07-10 RX ADMIN — ROCURONIUM BROMIDE 20 MG: 10 INJECTION INTRAVENOUS at 16:05

## 2019-07-10 RX ADMIN — LIDOCAINE HYDROCHLORIDE,EPINEPHRINE BITARTRATE 3 ML: 15; .005 INJECTION, SOLUTION EPIDURAL; INFILTRATION; INTRACAUDAL; PERINEURAL at 12:58

## 2019-07-10 RX ADMIN — FENTANYL CITRATE 50 MCG: 50 INJECTION, SOLUTION INTRAMUSCULAR; INTRAVENOUS at 18:29

## 2019-07-10 RX ADMIN — FENTANYL CITRATE 100 MCG: 50 INJECTION, SOLUTION INTRAMUSCULAR; INTRAVENOUS at 13:57

## 2019-07-10 RX ADMIN — DEXAMETHASONE SODIUM PHOSPHATE 8 MG: 4 INJECTION, SOLUTION INTRA-ARTICULAR; INTRALESIONAL; INTRAMUSCULAR; INTRAVENOUS; SOFT TISSUE at 14:15

## 2019-07-10 RX ADMIN — ROCURONIUM BROMIDE 20 MG: 10 INJECTION INTRAVENOUS at 18:24

## 2019-07-10 RX ADMIN — PROPOFOL 20 MG: 10 INJECTION, EMULSION INTRAVENOUS at 20:05

## 2019-07-10 RX ADMIN — Medication: at 21:12

## 2019-07-10 RX ADMIN — PROPOFOL 170 MG: 10 INJECTION, EMULSION INTRAVENOUS at 13:58

## 2019-07-10 RX ADMIN — SODIUM CHLORIDE, POTASSIUM CHLORIDE, SODIUM LACTATE AND CALCIUM CHLORIDE 500 ML: 600; 310; 30; 20 INJECTION, SOLUTION INTRAVENOUS at 23:32

## 2019-07-10 RX ADMIN — LABETALOL 20 MG/4 ML (5 MG/ML) INTRAVENOUS SYRINGE 5 MG: at 20:59

## 2019-07-10 RX ADMIN — MIDAZOLAM 1 MG: 1 INJECTION INTRAMUSCULAR; INTRAVENOUS at 12:52

## 2019-07-10 RX ADMIN — ROCURONIUM BROMIDE 30 MG: 10 INJECTION INTRAVENOUS at 15:00

## 2019-07-10 RX ADMIN — SODIUM CHLORIDE, POTASSIUM CHLORIDE, SODIUM LACTATE AND CALCIUM CHLORIDE: 600; 310; 30; 20 INJECTION, SOLUTION INTRAVENOUS at 13:39

## 2019-07-10 RX ADMIN — ESMOLOL HYDROCHLORIDE 10 MG: 10 INJECTION, SOLUTION INTRAVENOUS at 19:14

## 2019-07-10 RX ADMIN — SODIUM CHLORIDE, POTASSIUM CHLORIDE, SODIUM LACTATE AND CALCIUM CHLORIDE: 600; 310; 30; 20 INJECTION, SOLUTION INTRAVENOUS at 17:00

## 2019-07-10 RX ADMIN — BUPIVACAINE HYDROCHLORIDE 4 ML/HR: 7.5 INJECTION, SOLUTION EPIDURAL; RETROBULBAR at 15:00

## 2019-07-10 RX ADMIN — PHENYLEPHRINE HYDROCHLORIDE 100 MCG: 10 INJECTION INTRAVENOUS at 19:14

## 2019-07-10 RX ADMIN — PHENYLEPHRINE HYDROCHLORIDE 150 MCG: 10 INJECTION INTRAVENOUS at 20:17

## 2019-07-10 RX ADMIN — SCOPALAMINE 1 PATCH: 1 PATCH, EXTENDED RELEASE TRANSDERMAL at 13:20

## 2019-07-10 RX ADMIN — FENTANYL CITRATE 25 MCG: 50 INJECTION INTRAMUSCULAR; INTRAVENOUS at 21:50

## 2019-07-10 RX ADMIN — PHENYLEPHRINE HYDROCHLORIDE 100 MCG: 10 INJECTION INTRAVENOUS at 19:33

## 2019-07-10 RX ADMIN — PROPOFOL 10 MG: 10 INJECTION, EMULSION INTRAVENOUS at 20:13

## 2019-07-10 ASSESSMENT — MIFFLIN-ST. JEOR: SCORE: 1203.75

## 2019-07-10 NOTE — ANESTHESIA PREPROCEDURE EVALUATION
Anesthesia Pre-Procedure Evaluation    Patient: Karo Lindsay   MRN:     [id number] Gender:   female   Age:    49 year old :      1970        Preoperative Diagnosis: Stenosis Of Ileal Conduit Stoma   Procedure(s):  Revision or Replacement Of Urinary Conduit, Looposcopy     Past Medical History:   Diagnosis Date     Chronic UTI      Cloacal exstrophy      PONV (postoperative nausea and vomiting)       Past Surgical History:   Procedure Laterality Date     C REMV BLADDER/NODES,ILEAL CONDUIT       COLOSTOMY       HYSTERECTOMY       NEPHRECTOMY            Anesthesia Evaluation     . Pt has had prior anesthetic.     History of anesthetic complications   - PONV        ROS/MED HX    ENT/Pulmonary: Comment: carcinoid tumor of the left lung s/p lingulectomy 2018      Neurologic:       Cardiovascular:         METS/Exercise Tolerance:  >4 METS   Hematologic:         Musculoskeletal:         GI/Hepatic:         Renal/Genitourinary: Comment: R nephrectomy  for R pelvic kidney with low function  stenosis of her ileal conduit with hydroureteronephrosis in a solitary left kidney  Cloacal extrophy s/p cystectomy, end colostomy and ileal conduit     (+) Nephrolithiasis ,       Endo:         Psychiatric:         Infectious Disease:         Malignancy:         Other:    (+) No chance of pregnancy C-spine cleared: N/A, no H/O Chronic Pain,no other significant disability                        PHYSICAL EXAM:   Mental Status/Neuro:    Airway: Facies: Feasible  Mallampati: II  Mouth/Opening: Full  TM distance: > 6 cm  Neck ROM: Full   Respiratory: Auscultation: CTAB     Resp. Effort: Normal      CV: Rhythm: Regular  Rate: Age appropriate   Comments:      Dental: Normal                  Lab Results   Component Value Date    POTASSIUM 3.8 2018    CR 0.87 04/15/2019     (A) 2018    ALT 24 2018    AST 16 2018    INR 1.0 2018       Preop Vitals  BP Readings from Last 3 Encounters:  "  07/10/19 122/85   07/01/19 122/80   05/13/19 131/74    Pulse Readings from Last 3 Encounters:   07/10/19 98   07/01/19 98   05/13/19 98      Resp Readings from Last 3 Encounters:   07/10/19 14    SpO2 Readings from Last 3 Encounters:   07/10/19 99%      Temp Readings from Last 1 Encounters:   07/10/19 36.7  C (98  F) (Oral)    Ht Readings from Last 1 Encounters:   07/10/19 1.473 m (4' 10\")      Wt Readings from Last 1 Encounters:   07/10/19 69.4 kg (153 lb)    Estimated body mass index is 31.98 kg/m  as calculated from the following:    Height as of this encounter: 1.473 m (4' 10\").    Weight as of this encounter: 69.4 kg (153 lb).     LDA:  PICC Double Lumen 07/09/19 Right Basilic (Active)   Site Assessment WDL 7/9/2019 10:00 AM   External Cath Length (cm) 0 cm 7/9/2019 10:00 AM   Extremity Circumference (cm) 28.5 cm 7/9/2019 10:00 AM   Dressing Intervention Chlorhexidine patch;Transparent;Securing device;New dressing 7/9/2019 10:00 AM   Dressing Change Due 07/16/19 7/9/2019 10:00 AM   Lumen A - Color GRAY 7/9/2019 10:00 AM   Lumen A - Status blood return noted 7/9/2019 10:00 AM   Lumen A - Cap Change Due 07/13/19 7/9/2019 10:00 AM   Lumen B - Color PURPLE 7/9/2019 10:00 AM   Lumen B - Status blood return noted 7/9/2019 10:00 AM   Lumen B - Cap Change Due 07/13/19 7/9/2019 10:00 AM   Number of days: 1            Assessment:   ASA SCORE: 2    NPO Status: > 6 hours since completed Solid [employer]   Documentation: H&P complete; Preop Testing complete; Consents complete   Proceeding: Proceed without further delay  Tobacco Use:  NO Active use of Tobacco/UNKNOWN Tobacco use status     Plan:   Anes. Type:  General   Pre-Induction: Midazolam IV; Acetaminophen PO   Induction:  IV (Standard)   Airway: Oral ETT   Access/Monitoring: PIV; 2nd PIV   Maintenance: Balanced   Emergence: Procedure Site   Logistics: Same Day Surgery     Postop Pain/Sedation Strategy:  Standard-Options: Opioids PRN     PONV Management:  Adult Risk " Factors: Female, H/o PONV or Motion Sickness, Non-Smoker, Postop Opioids  Prevention: Ondansetron; Propofol Infusion; Scop. Patch     CONSENT: Direct conversation   Plan and risks discussed with: Patient; Spouse   Blood Products: Consented (ALL Blood Products)                         Anesthesia plan was discussed in detail with patient and . They understood and agreed to proceed as planned. All questions answered    Armando Titus MD

## 2019-07-10 NOTE — TELEPHONE ENCOUNTER
[facility] Call Center    Phone Message    May a detailed message be left on voicemail: yes    Reason for Call: Other: The pt has surgery today at 12:55 with Dr Causey. The pt is asking if she needs to change the ostomy bag in the surgery area prior to coming it today? She thought he would be remiving it anyway. Please call the pt right away as the surgery is today. Thanks.     Action Taken: Message routed to:  [facility] miguel urol.

## 2019-07-10 NOTE — TELEPHONE ENCOUNTER
Patient notified that she does not need to change her Ostomy bag prior to her surgery today.(per Vivien Sloan RNCC for Dr. Akhil Causey). Patient agreed with the plan.      Savita Lambert MA

## 2019-07-10 NOTE — OR NURSING
Spoke with Dr. Castillo of anesthesia who stated that a type and screen was not necessary for today's procedure.

## 2019-07-10 NOTE — OR NURSING
Epidural placed in pre op without complications. Test dose given at 12:59 VSS.  Pt tolerated well.  Will continue to monitor.

## 2019-07-10 NOTE — ANESTHESIA PROCEDURE NOTES
Epidural Procedure Note    Staff:     Anesthesiologist:  Live Hammond DO  Location: Pre-op     Procedure start time:  7/10/2019 12:50 PM     Procedure end time:  7/10/2019 1:02 PM   Pre-procedure checklist:   patient identified, IV checked, site marked, risks and benefits discussed, informed consent, monitors and equipment checked, pre-op evaluation, at physician/surgeon's request and post-op pain management      Correct Patient: Yes      Correct Position: Yes      Correct Site: Yes      Correct Procedure: Yes      Correct Laterality:  Yes    Site Marked:  Yes  Procedure:     Procedure:  Epidural catheter    ASA:  3    Position:  Sitting    Sterile Prep: chloraprep, mask and sterile gloves      Insertion site:  T7-8    Local skin infiltration:  1% lidocaine    amount (mL):  3    Approach:  Right paramedian    Needle gauge (G):  17    Needle Length (in):  3.5    Block Needle Type:  Deng    TE at (cm):  6    Attempts:  1    Redirects:  0    Catheter gauge (G):  20    Catheter threaded easily: Yes      Threaded to cm at skin:  10    Paresthesias:  No    Aspiration negative for Heme or CSF: Yes      Test dose (mL):  3     Local anesthetic:  Lidocaine 1.5% w/ 1:200,000 epinephrine    Test dose time:  12:57    Test dose negative for signs of intravascular, subdural or intrathecal injection: Yes    Assessment/Narrative:      Performed by Pb Pitts MD, [facility] fellow

## 2019-07-11 ENCOUNTER — APPOINTMENT (OUTPATIENT)
Dept: PHYSICAL THERAPY | Facility: CLINIC | Age: 49
End: 2019-07-11
Attending: UROLOGY
Payer: COMMERCIAL

## 2019-07-11 ENCOUNTER — APPOINTMENT (OUTPATIENT)
Dept: OCCUPATIONAL THERAPY | Facility: CLINIC | Age: 49
End: 2019-07-11
Attending: UROLOGY
Payer: COMMERCIAL

## 2019-07-11 LAB
ANION GAP SERPL CALCULATED.3IONS-SCNC: 10 MMOL/L (ref 3–14)
ANION GAP SERPL CALCULATED.3IONS-SCNC: 11 MMOL/L (ref 3–14)
BUN SERPL-MCNC: 14 MG/DL (ref 7–30)
BUN SERPL-MCNC: 16 MG/DL (ref 7–30)
CALCIUM SERPL-MCNC: 7.2 MG/DL (ref 8.5–10.1)
CALCIUM SERPL-MCNC: 7.6 MG/DL (ref 8.5–10.1)
CHLORIDE SERPL-SCNC: 108 MMOL/L (ref 94–109)
CHLORIDE SERPL-SCNC: 110 MMOL/L (ref 94–109)
CO2 SERPL-SCNC: 20 MMOL/L (ref 20–32)
CO2 SERPL-SCNC: 22 MMOL/L (ref 20–32)
CREAT SERPL-MCNC: 0.91 MG/DL (ref 0.52–1.04)
CREAT SERPL-MCNC: 0.93 MG/DL (ref 0.52–1.04)
ERYTHROCYTE [DISTWIDTH] IN BLOOD BY AUTOMATED COUNT: 13.1 % (ref 10–15)
ERYTHROCYTE [DISTWIDTH] IN BLOOD BY AUTOMATED COUNT: 13.2 % (ref 10–15)
ERYTHROCYTE [DISTWIDTH] IN BLOOD BY AUTOMATED COUNT: 13.5 % (ref 10–15)
GFR SERPL CREATININE-BSD FRML MDRD: 72 ML/MIN/{1.73_M2}
GFR SERPL CREATININE-BSD FRML MDRD: 74 ML/MIN/{1.73_M2}
GLUCOSE BLDC GLUCOMTR-MCNC: 131 MG/DL (ref 70–99)
GLUCOSE SERPL-MCNC: 139 MG/DL (ref 70–99)
GLUCOSE SERPL-MCNC: 174 MG/DL (ref 70–99)
HCT VFR BLD AUTO: 29.4 % (ref 35–47)
HCT VFR BLD AUTO: 31.5 % (ref 35–47)
HCT VFR BLD AUTO: 33.3 % (ref 35–47)
HGB BLD-MCNC: 10 G/DL (ref 11.7–15.7)
HGB BLD-MCNC: 10.7 G/DL (ref 11.7–15.7)
HGB BLD-MCNC: 9.1 G/DL (ref 11.7–15.7)
HGB BLD-MCNC: 9.1 G/DL (ref 11.7–15.7)
INTERPRETATION ECG - MUSE: NORMAL
LACTATE BLD-SCNC: 1.9 MMOL/L (ref 0.7–2)
LACTATE BLD-SCNC: 3.2 MMOL/L (ref 0.7–2)
MCH RBC QN AUTO: 28.4 PG (ref 26.5–33)
MCH RBC QN AUTO: 28.5 PG (ref 26.5–33)
MCH RBC QN AUTO: 29 PG (ref 26.5–33)
MCHC RBC AUTO-ENTMCNC: 31 G/DL (ref 31.5–36.5)
MCHC RBC AUTO-ENTMCNC: 31.7 G/DL (ref 31.5–36.5)
MCHC RBC AUTO-ENTMCNC: 32.1 G/DL (ref 31.5–36.5)
MCV RBC AUTO: 89 FL (ref 78–100)
MCV RBC AUTO: 91 FL (ref 78–100)
MCV RBC AUTO: 92 FL (ref 78–100)
PLATELET # BLD AUTO: 235 10E9/L (ref 150–450)
PLATELET # BLD AUTO: 248 10E9/L (ref 150–450)
PLATELET # BLD AUTO: 260 10E9/L (ref 150–450)
POTASSIUM SERPL-SCNC: 3.8 MMOL/L (ref 3.4–5.3)
POTASSIUM SERPL-SCNC: 4 MMOL/L (ref 3.4–5.3)
RBC # BLD AUTO: 3.2 10E12/L (ref 3.8–5.2)
RBC # BLD AUTO: 3.45 10E12/L (ref 3.8–5.2)
RBC # BLD AUTO: 3.75 10E12/L (ref 3.8–5.2)
SODIUM SERPL-SCNC: 139 MMOL/L (ref 133–144)
SODIUM SERPL-SCNC: 141 MMOL/L (ref 133–144)
WBC # BLD AUTO: 11.9 10E9/L (ref 4–11)
WBC # BLD AUTO: 16.2 10E9/L (ref 4–11)
WBC # BLD AUTO: 16.5 10E9/L (ref 4–11)

## 2019-07-11 PROCEDURE — 36592 COLLECT BLOOD FROM PICC: CPT | Performed by: STUDENT IN AN ORGANIZED HEALTH CARE EDUCATION/TRAINING PROGRAM

## 2019-07-11 PROCEDURE — 97165 OT EVAL LOW COMPLEX 30 MIN: CPT | Mod: GO

## 2019-07-11 PROCEDURE — 25800030 ZZH RX IP 258 OP 636: Performed by: STUDENT IN AN ORGANIZED HEALTH CARE EDUCATION/TRAINING PROGRAM

## 2019-07-11 PROCEDURE — 83605 ASSAY OF LACTIC ACID: CPT | Performed by: PHYSICIAN ASSISTANT

## 2019-07-11 PROCEDURE — 36415 COLL VENOUS BLD VENIPUNCTURE: CPT | Performed by: STUDENT IN AN ORGANIZED HEALTH CARE EDUCATION/TRAINING PROGRAM

## 2019-07-11 PROCEDURE — 25000132 ZZH RX MED GY IP 250 OP 250 PS 637: Performed by: STUDENT IN AN ORGANIZED HEALTH CARE EDUCATION/TRAINING PROGRAM

## 2019-07-11 PROCEDURE — 97161 PT EVAL LOW COMPLEX 20 MIN: CPT | Mod: GP | Performed by: PHYSICAL THERAPIST

## 2019-07-11 PROCEDURE — 12000001 ZZH R&B MED SURG/OB UMMC

## 2019-07-11 PROCEDURE — 80048 BASIC METABOLIC PNL TOTAL CA: CPT | Performed by: STUDENT IN AN ORGANIZED HEALTH CARE EDUCATION/TRAINING PROGRAM

## 2019-07-11 PROCEDURE — 87040 BLOOD CULTURE FOR BACTERIA: CPT | Performed by: STUDENT IN AN ORGANIZED HEALTH CARE EDUCATION/TRAINING PROGRAM

## 2019-07-11 PROCEDURE — G0463 HOSPITAL OUTPT CLINIC VISIT: HCPCS

## 2019-07-11 PROCEDURE — 85027 COMPLETE CBC AUTOMATED: CPT | Performed by: STUDENT IN AN ORGANIZED HEALTH CARE EDUCATION/TRAINING PROGRAM

## 2019-07-11 PROCEDURE — 93010 ELECTROCARDIOGRAM REPORT: CPT | Performed by: INTERNAL MEDICINE

## 2019-07-11 PROCEDURE — 85018 HEMOGLOBIN: CPT | Performed by: STUDENT IN AN ORGANIZED HEALTH CARE EDUCATION/TRAINING PROGRAM

## 2019-07-11 PROCEDURE — 25000125 ZZHC RX 250: Performed by: STUDENT IN AN ORGANIZED HEALTH CARE EDUCATION/TRAINING PROGRAM

## 2019-07-11 PROCEDURE — 97530 THERAPEUTIC ACTIVITIES: CPT | Mod: GO

## 2019-07-11 PROCEDURE — 25000132 ZZH RX MED GY IP 250 OP 250 PS 637: Performed by: PHYSICIAN ASSISTANT

## 2019-07-11 PROCEDURE — 93005 ELECTROCARDIOGRAM TRACING: CPT

## 2019-07-11 PROCEDURE — 97535 SELF CARE MNGMENT TRAINING: CPT | Mod: GO

## 2019-07-11 PROCEDURE — 97530 THERAPEUTIC ACTIVITIES: CPT | Mod: GP | Performed by: PHYSICAL THERAPIST

## 2019-07-11 PROCEDURE — 25000128 H RX IP 250 OP 636: Performed by: STUDENT IN AN ORGANIZED HEALTH CARE EDUCATION/TRAINING PROGRAM

## 2019-07-11 PROCEDURE — 83605 ASSAY OF LACTIC ACID: CPT | Performed by: STUDENT IN AN ORGANIZED HEALTH CARE EDUCATION/TRAINING PROGRAM

## 2019-07-11 PROCEDURE — 00000146 ZZHCL STATISTIC GLUCOSE BY METER IP

## 2019-07-11 RX ORDER — ONDANSETRON 2 MG/ML
4 INJECTION INTRAMUSCULAR; INTRAVENOUS EVERY 6 HOURS PRN
Status: DISCONTINUED | OUTPATIENT
Start: 2019-07-11 | End: 2019-07-21 | Stop reason: HOSPADM

## 2019-07-11 RX ORDER — NALOXONE HYDROCHLORIDE 0.4 MG/ML
.1-.4 INJECTION, SOLUTION INTRAMUSCULAR; INTRAVENOUS; SUBCUTANEOUS
Status: DISCONTINUED | OUTPATIENT
Start: 2019-07-11 | End: 2019-07-11

## 2019-07-11 RX ORDER — NALOXONE HYDROCHLORIDE 0.4 MG/ML
.1-.4 INJECTION, SOLUTION INTRAMUSCULAR; INTRAVENOUS; SUBCUTANEOUS
Status: DISCONTINUED | OUTPATIENT
Start: 2019-07-11 | End: 2019-07-21 | Stop reason: HOSPADM

## 2019-07-11 RX ORDER — KETOROLAC TROMETHAMINE 4 MG/ML
1 SOLUTION/ DROPS OPHTHALMIC 3 TIMES DAILY
Status: DISCONTINUED | OUTPATIENT
Start: 2019-07-11 | End: 2019-07-21 | Stop reason: HOSPADM

## 2019-07-11 RX ORDER — AMOXICILLIN 250 MG
2 CAPSULE ORAL 2 TIMES DAILY
Status: DISCONTINUED | OUTPATIENT
Start: 2019-07-11 | End: 2019-07-21 | Stop reason: HOSPADM

## 2019-07-11 RX ORDER — SODIUM CHLORIDE 9 MG/ML
INJECTION, SOLUTION INTRAVENOUS CONTINUOUS
Status: DISCONTINUED | OUTPATIENT
Start: 2019-07-11 | End: 2019-07-12 | Stop reason: ALTCHOICE

## 2019-07-11 RX ORDER — PROCHLORPERAZINE MALEATE 5 MG
10 TABLET ORAL EVERY 6 HOURS PRN
Status: DISCONTINUED | OUTPATIENT
Start: 2019-07-11 | End: 2019-07-21 | Stop reason: HOSPADM

## 2019-07-11 RX ORDER — ACETAMINOPHEN 325 MG/1
650 TABLET ORAL EVERY 4 HOURS PRN
Status: DISCONTINUED | OUTPATIENT
Start: 2019-07-11 | End: 2019-07-21 | Stop reason: HOSPADM

## 2019-07-11 RX ORDER — ONDANSETRON 4 MG/1
4 TABLET, ORALLY DISINTEGRATING ORAL EVERY 6 HOURS PRN
Status: DISCONTINUED | OUTPATIENT
Start: 2019-07-11 | End: 2019-07-21 | Stop reason: HOSPADM

## 2019-07-11 RX ORDER — PREDNISOLONE ACETATE 10 MG/ML
1 SUSPENSION/ DROPS OPHTHALMIC 3 TIMES DAILY
Status: DISCONTINUED | OUTPATIENT
Start: 2019-07-11 | End: 2019-07-21 | Stop reason: HOSPADM

## 2019-07-11 RX ORDER — LIDOCAINE 40 MG/G
CREAM TOPICAL
Status: DISCONTINUED | OUTPATIENT
Start: 2019-07-11 | End: 2019-07-21 | Stop reason: HOSPADM

## 2019-07-11 RX ORDER — AMOXICILLIN 250 MG
1 CAPSULE ORAL 2 TIMES DAILY
Status: DISCONTINUED | OUTPATIENT
Start: 2019-07-11 | End: 2019-07-21 | Stop reason: HOSPADM

## 2019-07-11 RX ADMIN — PREDNISOLONE ACETATE 1 DROP: 10 SUSPENSION/ DROPS OPHTHALMIC at 13:35

## 2019-07-11 RX ADMIN — KETOROLAC TROMETHAMINE 1 DROP: 4 SOLUTION/ DROPS OPHTHALMIC at 13:23

## 2019-07-11 RX ADMIN — SODIUM CHLORIDE: 9 INJECTION, SOLUTION INTRAVENOUS at 00:45

## 2019-07-11 RX ADMIN — SODIUM CHLORIDE: 9 INJECTION, SOLUTION INTRAVENOUS at 19:18

## 2019-07-11 RX ADMIN — Medication: at 13:24

## 2019-07-11 RX ADMIN — SODIUM CHLORIDE, POTASSIUM CHLORIDE, SODIUM LACTATE AND CALCIUM CHLORIDE 500 ML: 600; 310; 30; 20 INJECTION, SOLUTION INTRAVENOUS at 00:41

## 2019-07-11 RX ADMIN — SODIUM CHLORIDE 1000 ML: 9 INJECTION, SOLUTION INTRAVENOUS at 08:28

## 2019-07-11 RX ADMIN — SODIUM CHLORIDE: 9 INJECTION, SOLUTION INTRAVENOUS at 14:27

## 2019-07-11 RX ADMIN — KETOROLAC TROMETHAMINE 1 DROP: 4 SOLUTION/ DROPS OPHTHALMIC at 19:18

## 2019-07-11 RX ADMIN — ACETAMINOPHEN 975 MG: 325 SOLUTION ORAL at 13:23

## 2019-07-11 RX ADMIN — PREDNISOLONE ACETATE 1 DROP: 10 SUSPENSION/ DROPS OPHTHALMIC at 19:18

## 2019-07-11 RX ADMIN — PREDNISOLONE ACETATE 1 DROP: 10 SUSPENSION/ DROPS OPHTHALMIC at 05:56

## 2019-07-11 ASSESSMENT — ACTIVITIES OF DAILY LIVING (ADL)
ADLS_ACUITY_SCORE: 18
COGNITION: 0 - NO COGNITION ISSUES REPORTED
BATHING: 0-->INDEPENDENT
ADLS_ACUITY_SCORE: 10
ADLS_ACUITY_SCORE: 10
FALL_HISTORY_WITHIN_LAST_SIX_MONTHS: NO
ADLS_ACUITY_SCORE: 10
RETIRED_COMMUNICATION: 0-->UNDERSTANDS/COMMUNICATES WITHOUT DIFFICULTY
RETIRED_EATING: 0-->INDEPENDENT
TRANSFERRING: 0-->INDEPENDENT
DRESS: 0-->INDEPENDENT
SWALLOWING: 0-->SWALLOWS FOODS/LIQUIDS WITHOUT DIFFICULTY
TOILETING: 0-->INDEPENDENT
AMBULATION: 0-->INDEPENDENT
ADLS_ACUITY_SCORE: 10
ADLS_ACUITY_SCORE: 10

## 2019-07-11 NOTE — PROGRESS NOTES
Responded to Rapid Response call. RRT was called due to elevated lactic acid. Patient was on LFNC @ 1.5 LPM, RR 15/min, SpO2 97%, end tidal CO2: 34. Respiratory interventions not required. Patient outcome/transfer pending.    Sebastián Flor, RT  7/11/2019 12:01 AM

## 2019-07-11 NOTE — ANESTHESIA POSTPROCEDURE EVALUATION
Anesthesia POST Procedure Evaluation    Patient: Karo Lindsay   MRN:     [id number] Gender:   female   Age:    49 year old :      1970        Preoperative Diagnosis: Stenosis Of Ileal Conduit Stoma   Procedure(s):  Take Down Of Urinary Conduit,Creation of a New Conduit ,Looposcopy, Extensive Lysis of Adhesions and Left Ureteral Stent Exchange   Postop Comments: No value filed.       Anesthesia Type:  General  No value filed.    Reportable Event: NO     PAIN: Uncomplicated   Sign Out status: Comfortable, Well controlled pain     PONV: No PONV   Sign Out status:  No Nausea or Vomiting     Neuro/Psych: Uneventful perioperative course   Sign Out Status: Preoperative baseline; Age appropriate mentation     Airway/Resp.: Uneventful perioperative course   Sign Out Status: Non labored breathing, age appropriate RR; Resp. Status within EXPECTED Parameters     CV: Uneventful perioperative course   Sign Out status: Appropriate BP and perfusion indices; Appropriate HR/Rhythm     Disposition:   Sign Out in:  PACU  Disposition:  Floor  Recovery Course: Uneventful  Follow-Up: Not required           Last Anesthesia Record Vitals:  CRNA VITALS  7/10/2019 2005 - 7/10/2019 2105      7/10/2019             Resp Rate (observed):  --          Last PACU Vitals:  Vitals Value Taken Time   /70 7/10/2019  9:20 PM   Temp 37.2  C (98.9  F) 7/10/2019  8:40 PM   Pulse 102 7/10/2019  9:20 PM   Resp 19 7/10/2019  8:40 PM   SpO2 93 % 7/10/2019  9:23 PM   Temp src     NIBP     Pulse     SpO2     Resp     Temp     Ht Rate     Temp 2     Vitals shown include unvalidated device data.      Electronically Signed By: Des Robles MD, July 10, 2019, 9:25 PM

## 2019-07-11 NOTE — PROGRESS NOTES
REGIONAL ANESTHESIA PAIN SERVICE EPIDURAL NOTE  Karo Lindsay is a 49 year old female POD #1 s/p REVISION, URINARY CONDUIT and placement of T7-8 epidural catheter for pain management.      SUBJECTIVE  Interval History: Overnight events: Hypotensive, tachycardic overnight. Triggered sepsis protocol with lactate of 6.9 and rapid response called. Patient reports moderte pain control with epidural infusion and current  analgesic medications (see below).  Denies weakness, paresthesias, circumoral numbness, metallic taste or tinnitus.  Patient has not been OOB yet this am.  Currently NPO, denies nausea or vomiting.  Urostomy with dark urine output.     Clinically Aligned Pain Assessment (CAPA):  Comfort (How is your pain?): Tolerable with discomfort  Change in Pain (Since your last medication/intervention?): About the same  Pain Control (How are your pain treatments working?):  Partially effective pain control  Functioning (Are you able to do activities to get better?) : Can do most things, but pain gets in the way of some   Sleep (Does your pain management allow you to sleep or rest?): Awake with occasional pain     Pain Intensity using Numerical Rating Scale (NRS):    2/10 at rest and 5/10 with activity      Antithrombotic/Thrombolytic Therapy ordered:  none    Analgesic Medications:  Medications related to Pain Management (From now, onward)    Start     Dose/Rate Route Frequency Ordered Stop    07/11/19 0800  senna-docusate (SENOKOT-S/PERICOLACE) 8.6-50 MG per tablet 1 tablet      1 tablet Oral 2 TIMES DAILY 07/11/19 0017      07/11/19 0800  senna-docusate (SENOKOT-S/PERICOLACE) 8.6-50 MG per tablet 2 tablet      2 tablet Oral 2 TIMES DAILY 07/11/19 0017      07/11/19 0017  lidocaine 1 % 0.1-1 mL      0.1-1 mL Other EVERY 1 HOUR PRN 07/11/19 0017      07/11/19 0017  lidocaine (LMX4) cream       Topical EVERY 1 HOUR PRN 07/11/19 0017      07/11/19 0017  acetaminophen (TYLENOL) tablet 650 mg      650 mg Oral EVERY 4  "HOURS PRN 07/11/19 0017      07/10/19 2100  HYDROmorphone (DILAUDID) PCA 1 mg/mL OPIOID NAIVE       Intravenous CONTINUOUS 07/10/19 2053      07/10/19 1315  bupivacaine (MARCAINE) 0.125 % in sodium chloride 0.9 % 250 mL EPIDURAL Infusion      6 mL/hr  EPIDURAL CONTINUOUS 07/10/19 1310             OBJECTIVE  Lab Results:   Recent Labs   Lab Test 07/11/19  0437   WBC 16.5*   RBC 3.45*   HGB 10.0*   HCT 31.5*   MCV 91   MCH 29.0   MCHC 31.7   RDW 13.2          Lab Results   Component Value Date    INR 1.0 04/24/2018       Vitals:    Temp:  [96.8  F (36  C)-99.5  F (37.5  C)] 99.5  F (37.5  C)  Pulse:  [] 114  Heart Rate:  [] 115  Resp:  [11-25] 12  BP: ()/(30-89) 96/45  SpO2:  [93 %-100 %] 96 %  BP 96/45 (BP Location: Left arm)   Pulse 114   Temp 99.5  F (37.5  C) (Axillary)   Resp 12   Ht 1.473 m (4' 10\")   Wt 69.4 kg (153 lb)   SpO2 96%   BMI 31.98 kg/m         Exam:   GEN: alert and no distress  NEURO/MSK:Strength B/L LE 5/5  and overall symmetric  SKIN: Epidural catheter site with dressing c/d/i, no tenderness, erythema, heme, edema     ASSESSMENT/PLAN:    Patient is receiving adequate analgesia with current multimodal therapy including T7-8 epidural catheter infusion of Bupivacaine 0.125% at 6mL/hr.  Pt has not been OOB yet this am.  No evidence of adverse side effects related to local anesthetic.    - continue current epidural infusion Bupivacaine 0.125% at 6mL/hour, POD #1-MAP upper 60's  - antithrombotic/thrombolytic therapy: none. Please contact RAPS (#[phone]) prior to any medication changes  - will continue to follow and adjust as needed    - discussed plan with attending anesthesiologist    LISHA Scott CNP  Regional Anesthesia Pain Service  7/11/2019 6:49 AM    RAPS Contact Info (24 hour job code pager is the last 4 digits) For in-house use only:   [place] phone: [place] [phone], [place] [phone], [employer] [phone], then enter call-back number.    Text: Use AMCOM on " the Intranet <Paging/Directory> tab and enter Jobcode ID.   If no call back at any time, contact the hospital  and ask for RAPS attending or backup

## 2019-07-11 NOTE — PROGRESS NOTES
[facility], [place]    Sepsis Evaluation Progress Note    Date of Service: 07/11/2019    I was called to see Karo Lindsay due to abnormal vital signs triggering the Sepsis SIRS screening alert. She is not known to have an infection. She is POD#1 from takedown of ileal conduit, CATRINA, ureterolysis, and creation of a new ileal conduit. Patient not endorsing any new pain or odd sensations.     Physical Exam    Vital Signs:  Temp: 97.2  F (36.2  C) Temp src: Axillary BP: (!) 99/30 Pulse: 114 Heart Rate: 115 Resp: 11 SpO2: 98 % O2 Device: Nasal cannula Oxygen Delivery: 2 LPM  GEN: A&O x3, responding to questions appropriately  CV: Tachycardic, regular rhythm  Pulm: NLB on 1 L/min NC  Ab: Abdomen soft, non-distended, appropriately tender to palpation. R abdominal stoma with dark UOP, mucous in bag, L abdominal ileostomy w/ a scant brown liquid. LLQ Marcello w/ SS output, laparotomy incision closed w/ dressing, minimal shadowing inferiorly.     Lab:  Lactic Acid   Date Value Ref Range Status   07/10/2019 6.9 (HH) 0.7 - 2.0 mmol/L Final       The patient is at baseline mental status.    The rest of their physical exam is significant for - please see above.    Assessment and Plan    The SIRS and exam findings are likely due to dehydration, post-op/post-anesthesia status., there is no sign of sepsis at this time.  However Lactate of >6 is alarming and will be rechecked following fluid resuscitation.     Disposition: The patient will remain on the current unit. We will continue to monitor this patient closely.    Arnaldo Hopson MD  Urology Resident

## 2019-07-11 NOTE — PHARMACY-ADMISSION MEDICATION HISTORY
Pharmacy Admission Medication History    Admission medication history interview status for the 7/10/2019 admission is complete. See EPIC admission navigator for allergy information, prior to admission medications and immunization status.     Medication history interview source(s): Patient    Medication history resources (including written lists, pill bottles, clinic record): None    Medication history source reliability: Good    Primary pharmacy: [facility], [place], [place]    Actions taken by pharmacist (provider contacted, medication changes, etc):Contacted provider about omitted Ketorolac order     Changes made to medication history:    Modified medication on list: Directions for Ophthalmic drops.     Additional medication history information: Directions for Ophthalmic drops states to instill in right eyes, however patient states she instills them in both eyes.    Medication reconciliation/reorder completed by provider prior to medication history? Yes    Time spent in this activity: 30 minutes    Prior to Admission medications    Medication Sig Last Dose Taking? Auth Provider   acetaminophen (TYLENOL) 500 MG tablet Take 1,000 mg by mouth every 4 hours as needed  7/9/2019 at 1400 Yes Reported, Patient   ketorolac tromethamine (ACULAR-LS) 0.4 % SOLN ophthalmic solution Place 1 drop into both eyes 3 times daily  7/10/2019 at 0600 Yes Reported, Patient   prednisoLONE acetate (PRED FORTE) 1 % ophthalmic suspension USE 1 DROP BOTH  EYES TID. 7/10/2019 at 0600 Yes Reported, Patient   rizatriptan (MAXALT) 10 MG tablet Take by mouth as needed Past Week at Unknown time Yes Reported, Patient   LORazepam (ATIVAN) 0.5 MG tablet Take 0.5 mg by mouth as needed (Uses for flying)  More than a month at Unknown time  Reported, Patient

## 2019-07-11 NOTE — PLAN OF CARE
Received pt from PACU at 2300 accompanied by  and staff. 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 Procedure done was  take down of urinary conduit, Creation of new conduit looposcopy, extensive lysis of adhesions and L ureteral stent exchange. Has ongoing Bupivacaine drip at 6 ml/hr via epidural line with CDI dressing. Has Dilaudid PCA. On O2 inhalation at 2 lpm via nasal cannula. L colostomy bag intact, no stool noted. R ileal conduit with 1 stent to urostomy bag draining gloria urine to drainage bag. Abdominal drain to bulb suction with bloody output. Post op abdominal dressing is CDI.  Alert and oriented x 4. Verbalized abdominal pain with partial relief from current pain regimen. Hypotensive and tachycardic. Triggered for sepsis protocol, Lactic acid came back at 6.9. RRT called. Dr. Hopson (Urology) and Dr. Ruiz ( Regional anesthesia ) informed and seen pt at bedside. 1 liter LR bolus given. Recheck of lactic acid at 0430 is 3.2, Dr. Hopson informed.Tolerating ice chips overnight. On continuous capnography monitoring overnight. Turned and repositioned in bed. Incentive Spirometry exercise started by pt. Seen by Urology Surgery team this morning and discussed plan of care with pt and .

## 2019-07-11 NOTE — PROGRESS NOTES
Admission/Transfer from: PACU  2 RN skin assessment completed by:  Aimee  Pt has ileal conduit and colosotmy  Pt has abdominal incision with CDI dressing  Pt has epidural dressing in the back with CDI dressing

## 2019-07-11 NOTE — PLAN OF CARE
Discharge Planner OT   Patient plan for discharge: not discussed  Current status: OT evaluation completed. Pt significantly limited by post op pain limiting progression in OOB activity. Pt max A for bed mobility and min A for standing. Pt completed transfer to chair with CGA-min A. Pt max A for LB dressing. Pt with mild dizziness when seated EOB, pt hypotensive however BP improved with OOB activity. See vitals flow sheet.   Barriers to return to prior living situation: medical status, pain, post op precautions, level of assist needed for ADLs, stairs   Recommendations for discharge: TCU   Rationale for recommendations: Anticipate pt will make significant gains in therapies when post op pain is better controled however based on current function pt would benefit from continued rehab prior to discharge home to increase independence and safety with ADLs.        Entered by: Carol uW 07/11/2019 11:14 AM

## 2019-07-11 NOTE — PROVIDER NOTIFICATION
07/11/19 0000   Call Information   Date of Call 07/11/19   Time of Call 2355   Name of person requesting the team Rosemary   Title of person requesting team RN   RRT Arrival time 0000   Time RRT ended 0100   Reason for call   Type of RRT Adult   Primary reason for call Sepsis suspected   Sepsis Suspected Elevated Lactate level   Was patient transferred from the ED, ICU, or PACU within last 24 hours prior to RRT call? 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 P/o day 0 for Take Down Of Urinary Conduit,Creation of a New Conduit ,Looposcopy, Extensive Lysis of Adhesions and Left Ureteral Stent Exchange.   Notable History/Conditions Recent surgery   Assessment Pt resting comfortably, denies pain/lightheadedness/dizziness. BP soft but recieving bolus and MIVF added. Plan to recheck LA after fluid.    Interventions Fluid bolus;IV fluids;Labs   Patient Outcome   Patient Outcome Stabilized on unit   RRT Team   Attending/Primary/Covering Physician Urology   Date Attending Physician notified 07/11/19   Time Attending Physician notified 9910   Physician(s) Arnaldo Ferreira RN Roseanna Randall   RT Stas Fairbanks   Post RRT Intervention Assessment   Post RRT Assessment Stable/Improved   Date Follow Up Done 07/11/19   Time Follow Up Done 0315   Comments VSS, pt resting comfortably.      LA recheck 3.4.

## 2019-07-11 NOTE — PLAN OF CARE
5A:  Discharge Planner PT   Patient plan for discharge: not discussed today  Current status: PT evaluation completed. Pt transfers sit to stand with FWW min A x1. Pt transfers from chair to bed with FWW CGA. Pt min A for sit to supine transfer with verbal cues for technique in order to follow abdominal precautions. Overall, pt is very limited by pain with mobility and was drowsy throughout session due to medications.   Barriers to return to prior living situation: medical status, stairs, pain management, deconditioning  Recommendations for discharge: TCU at this time, anticipate home with assist pending LOS  Rationale for recommendations: At this point, pt's mobility is below baseline therefore pt will benefit from continued PT intervention to regain functional independence. Currently pt is limited by pain after surgery, anticipate pt progressing quickly in order to return home with assist because pt was independent with mobility prior to admission.        Entered by: Leyla Payton 07/11/2019 3:37 PM

## 2019-07-11 NOTE — PROGRESS NOTES
"Urology  Progress Note  Hypotensive, tachycardic overnight. Triggered sepsis protocol with lactate of 6.9 and rapid response called. Was bolused with 1L LR and previously prescribed post-op mIVF was started. Patient endorsed feeling well through all of this although was tired. No new pain symptoms. Recheck lactate 3.2 this AM.  Urine output low but is producing urine. No new complaints this morning.   - tolerating ice chips without NV  - pain well controlled    Exam  /49 (BP Location: Left arm)   Pulse 120   Temp 97  F (36.1  C) (Axillary)   Resp 19   Ht 1.473 m (4' 10\")   Wt 69.4 kg (153 lb)   SpO2 96%   BMI 31.98 kg/m    No acute distress  Tachycardic, regular rhythm  Unlabored breathing on 1 L/min NC  Abdomen soft, non-distended, appropriately tender to palpation. , L abdominal ileostomy w/ a scant brown liquid. Laparotomy incision closed w/ dressing minimal shadowing  R abdominal urostomy with dark UOP, mucous in bag, stoma pink and viable with stent. Yellow urine in bag.   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     Neuro: PCA, epidural for pain control  CV: BP improved with fluids, tachycardic  Pulm: incentive spirometry while awake  FEN/GI: NPO with ice chips, MIVF @ 125/hr,   Endo: DIRK  : monitor urine output  Heme/ID: Hgb stable, BCx x2 (sepsis protocol) pending, afebrile  Activity: Up with assist. Encourage ambulation.   PPx: SCDs    Seen and examined with the chief resident. Will discuss with Dr. Causey.    Fiordaliza Otero MD  Urology PGY-3       Contacting the Urology Team     Please use the following job codes to reach the Urology Team. Note that you must use an in house phone and that job codes cannot receive text pages.     On weekdays, dial [phone] (or " star-star-star [phone] on the new [place] telephones) then [phone] to reach the Adult Urology resident or PA on call    On weekdays, dial [phone] (or star-star-star [phone] on the new [place] telephones) then [phone] to reach the Pediatric Urology resident    On weeknights and weekends, dial [phone] (or star-star-star [phone] on the new [place] telephones) then [phone] to reach the Urology resident on call (for both Adult and Pediatrics)

## 2019-07-11 NOTE — PROGRESS NOTES
WOC Nurse Inpatient [facility]   WO Nurse Inpatient Adult     Initial Assessment   Assessment of revised ileal conduit Stoma complications: none  and   established end Ileostomy Stoma complication(s) prolapse   Mucocutaneous junction; not assessed today   Peristomal complication(s) not assessed today   Pouch wear time:3-4 days,   Following today's visit:Patient is  able to demonstrate; patient has had ileal conduit and ileostomy since birth      1. How to empty their pouch? yes      2. How to change their pouch?  yes      3. How to read and record intake and output correctly? 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  Current Diet/Nutrition: Orders Placed This Encounter      NPO for Medical/Clinical Reasons Except for: Meds, Ice Chips     TPN no   I/O last 3 completed shifts:  In: 2845.83 [I.V.:2845.83]  Out: 520 [Urine:400; Drains:120]  Labs:    Recent Labs   Lab 07/11/19  1153 07/11/19  0437   HGB 9.1* 10.0*   WBC  --  16.5*        Physical Exam:  Ileal Conduit s/p revision on 7/10/19  Current pouching system:[place] two piece flat, placed in OR   Reason for pouch change today: pouch not changed today  Stoma appearance: viable, healthy, edematous  Stoma size; not measured today, one stent in place  Peristomal skin: not visualized (barrier in place)  Stoma output :gloria   Abdominal  Assessment  did not palpate today, pt in significant pain at time of assessment  , NG still in place? No  Surgical Site: dressing dry and intact  Pain: Cramping and Sharp  Is patient still on a PCA Yes    Ileostomy, established   Current pouching system:[place] two piece flat, placed in OR   Reason for pouch change today: pouch not changed today  Stoma appearance: viable, healthy, normal-appearing and prolapsed  Stoma size; not  measured today  Peristomal skin: not visualized (barrier in place)  Stoma output :brown     Interventions:  Patient's chart evaluated.  Focus of today's visit: getting supplies for patient   Participant of teaching session today patient  and spouse  Change made with ostomy management today: No  Patient/family: lethargic and observing  Supplies:Ordered soft convex one piece fecal pouch, andreea ring and protective sheet. (for ileostomy)    Plan:  Learning needs: will return tomorrow to assist patient with pouch change   Preparation for discharge: No discharge preparations started  Recommend home care? patient very comfortable with pouch change procedures, home RN not needed for ostomy teaching     Discussed plan of care with Patient and Nurse  Nursing to notify the Provider(s) and re-consult the WOC Nurse if new ostomy concerns or discharge planned before next planned WOC visit.    WOC Nurse will return: Friday  Face to face time: 15 minutes    Vonda Cameron

## 2019-07-11 NOTE — PROGRESS NOTES
07/11/19 1400   Quick Adds   Type of Visit Initial PT Evaluation   Living Environment   Lives With spouse   Living Arrangements house   Home Accessibility stairs to enter home;stairs within home   Number of Stairs, Main Entrance 3   Stair Railings, Main Entrance railing on right side (ascending)   Number of Stairs, Within Home, Primary other (see comments)  (13)   Transportation Anticipated car, drives self;family or friend will provide   Living Environment Comment Pt lives with , with 13 stairs up to bedroom on 2nd floor.    Self-Care   Usual Activity Tolerance good   Current Activity Tolerance fair   Regular Exercise Yes   Activity/Exercise Type swimming   Exercise Amount/Frequency 3-5 times/wk   Equipment Currently Used at Home none   Activity/Exercise/Self-Care Comment Pt independent with all mobility prior to admission. Does not use AD at baseline   Functional Level Prior   Ambulation 0-->independent   Transferring 0-->independent   Toileting 0-->independent   Bathing 0-->independent   Communication 0-->understands/communicates without difficulty   Fall history within last six months no   Which of the above functional risks had a recent onset or change? 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    Precautions/Limitations abdominal precautions;fall precautions   Weight-Bearing Status - LUE full weight-bearing   Weight-Bearing Status - RUE full weight-bearing   Weight-Bearing Status - LLE full weight-bearing   Weight-Bearing Status - RLE full weight-bearing   General  Observations Pt lethargic throughout session due to meds. Overall, mobility is limited to pain   General Info Comments Activity orders: up with assist   Cognitive Status Examination   Orientation orientation to person, place and time   Level of Consciousness lethargic/somnolent   Follows Commands and Answers Questions 100% of the time   Personal Safety and Judgment intact   Memory intact   Cognitive Comment Pt reports drowsiness from medications    Pain Assessment   Patient Currently in Pain Yes, see Vital Sign flowsheet   Integumentary/Edema   Integumentary/Edema no deficits were identifed   Posture    Posture Forward head position;Protracted shoulders   Range of Motion (ROM)   ROM Comment WFL per mobility   Strength   Strength Comments Not formally assessed due to precautions, WFL per mobility, likely >3/5 MMT   Bed Mobility   Bed Mobility Comments Required verbal cues for log roll to transfer sit to supine, performed bridging IND   Transfer Skills   Transfer Comments Transfers STS from chair with FWW min A x1   Gait   Gait Comments took 5 steps to bed from chair with FWW, overall slow gait speed    Balance   Balance no deficits were identified   Sensory Examination   Sensory Perception Comments Pt reported tingling in B feet, but it is not new   Coordination   Coordination no deficits were identified   Muscle Tone   Muscle Tone no deficits were identified   General Therapy Interventions   Planned Therapy Interventions bed mobility training;home program guidelines;progressive activity/exercise;risk factor education;transfer training;gait training;balance training;strengthening   Clinical Impression   Criteria for Skilled Therapeutic Intervention yes, treatment indicated   PT Diagnosis impaired mobility   Influenced by the following impairments abdominal precautions, pain, deconditioning   Functional limitations due to impairments ambulation, transfers   Clinical Presentation Stable/Uncomplicated   Clinical  "Presentation Rationale per clinical judgement    Clinical Decision Making (Complexity) Low complexity   Therapy Frequency 5x/week   Predicted Duration of Therapy Intervention (days/wks) 1 week   Anticipated Discharge Disposition Transitional Care Facility;Home with Assist   Risk & Benefits of therapy have been explained Yes   Patient, Family & other staff in agreement with plan of care Yes   Clinical Impression Comments Pt presents with pain and abdominal precautions resulting in decreased mobility. PT inverventions indicated to address deficits described above   [facility] TM \"6 Clicks\"   2016, Trustees of [facility], under license to [employer].  All rights reserved.   6 Clicks Short Forms Basic Mobility Inpatient Short Form   [facility] AM-PAC  \"6 Clicks\" V.2 Basic Mobility Inpatient Short Form   1. Turning from your back to your side while in a flat bed without using bedrails? 3 - A Little   2. Moving from lying on your back to sitting on the side of a flat bed without using bedrails? 3 - A Little   3. Moving to and from a bed to a chair (including a wheelchair)? 3 - A Little   4. Standing up from a chair using your arms (e.g., wheelchair, or bedside chair)? 3 - A Little   5. To walk in hospital room? 3 - A Little   6. Climbing 3-5 steps with a railing? 2 - A Lot   Basic Mobility Raw Score (Score out of 24.Lower scores equate to lower levels of function) 17   Total Evaluation Time   Total Evaluation Time (Minutes) 4     "

## 2019-07-11 NOTE — OP NOTE
PREOPERATIVE DIAGNOSIS:    1. Stomal stenosis  2. Stenosis along length of ileal conduit  3. Symptomatic left hydroureteronephrosis      POSTOPERATIVE DIAGNOSIS:    1. Stomal stenosis  2. Stenosis along length of ileal conduit  3. Symptomatic left hydroureteronephrosis      PROCEDURE PERFORMED:   1. Takedown of prior ileal conduit  2. Looposcopy  3. Lysis of bowel adhesions  4. Ureterolysis  5. Creation of new ileal conduit     STAFF SURGEON:  Akhil Causey MD     FELLOW: Nona Najera MD     RESIDENT SURGEON:  Martínez Reilly MD      ESTIMATED BLOOD LOSS:  250mL.      DRAINS AND TUBES: 19F Marcello drain     SPECIMENS OBTAINED:  Ileal conduit.      COMPLICATIONS:  None.      DISPOSITION:  PACU.      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 She developed left flank pain and hydronephrosis and loopogram confirmed free reflux of left uretero-enteric anastomosis but there was tight stenosis of the distal conduit and possible stenosis of the middle and proximal portions of the conduit. She initially did not want surgical intervention but her symptoms progressed and she elected to proceed with surgical correction of the urinary conduit. She did attempt to have a retrograde nephroureteral stent placed which only exacerbated her symptoms. She was explained that if her conduit is only stenosed at the fascia level or distal segment we would simply pull this part up and save the rest. However, given the appearance of the loopogram she is aware of my concern that she will likely need completely new conduit creation.    The risks, benefits and alternatives were discussed including bleeding, infection, intestinal leak or  obstruction possibly requiring reoperation, abdominal wall hernia, parastomal hernia, pyelonephritis, and recurrent symptoms. The patient would like to proceed.      OPERATION PERFORMED:  Informed consent was obtained from the patient.  The patient was then brought to the operating theater and general anesthesia was induced.  A timeout was then performed verifying the correct patient's site and procedure to be performed.  The patient was placed in supine position and he was prepped and draped in the usual sterile fashion. Her end ileostomy was temporarily closed with a 2-0 silk purse-string suture. She was given preoperative antibiotics prior to skin incision.    We began by taking down the stoma of the ileal conduit. A series of 3-0 silk sutures were placed around the stoma circumferentially approximately 1.5cm from the stoma edge. Another series of 3-0 vicryl sutures were placed just inside the silk suture closer to the stoma. The vicryl sutures were used as a handle to circumferentially incise the skin around the stoma and elevate it. Electrocautery was used to dissect down to fascia until the stoma was almost completely free from the fascia. We then proceeded to make a laparotomy incision in the vertical midline and carried this down through Jeaneth's to the abdominal wall fascia. Two Kochers were used to elevated the fascia away from the abdominal contents. The fascia was sharply entered with Metzenbaum scissors. Immediately dense adhesions became visible. We spent 1.5 hours performing lysis of bowel adhesions until the urinary conduit was free from its attachments and the rest of the small bowel was mobile. The nephroutereal stent within the conduit served as our guide for dissection to prevent any inadertent bowel injury. Unforetunately, the proximal end of the conduit and distal aspect of the left ureter were densely adherent to the retroperitoneum and iliac vessels. We decided to perform looposcopy at this  time to fully evaluate the conduit and determine if some of it could be spared. Looposcopy revealed nearly pan-conduit stenosis and the mucosa did not appear healthy. We decided to excise the entire conduit and create a new ileal conduit.     We then performed left ureterolysis for 1.5 hours to ensure the ureter would have enough mobility for a new conduit and to ensure we were able to remove the old conduit. Once the conduit was completely free we used the LigaSure to divide the mesentery of the conduit and Metzenbaum scissors were used to transect the left ureter at the level of the conduit. The old ileal conduit was sent as a specimen and the nephroureteral stent was passed off the field. A new segment of ileum was chosen far enough from her end ileostomy to ensure good blood supply was maintained to this area. We harvested 20cm of ileum. The bowel was transected with electrocautery and mesentery lengthened with LigaSure. The two ends of bowel were anastomosed via a two layer hand-sewn anastomosis.The mucosal layer was closed with a running 4-0 vicryl and serosal layer was closed with 3-0 vicryl in an interrupted Lambert fashion. The mesenteric trap was closed with 3-0 silk sutures.     Our bowel segment that would serve for our new urinary conduit was copiously irrigated to remove bowel contents. The left ureter was so hydronephrotic that this did not need spatulation but rather could be anastomosed directly to the end of the conduit. The posterior wall of the ureter was secured with interrupted 4-0 PDS. The anterior wall was closed with running 4-0 PDS. A 7F diversion single J stent was passed up to the left kidney and brought out the end of the conduit prior to finishing our uretero-enteric anastomosis. The distal conduit was then brought out of her of prior stoma site for maturation. The conduit was secured to the anterior fascia with 0 vicryl interrupted sutures in 3 quadrants - avoiding the mesentery. The  stoma was matured in Lore Bud fashion using a combination of 3-0 and 4-0 vicryl sutures. A 19F Marcello drain was placed in the left lower quadrant and secured with a 3-0 Nylon. The fascia was closed with 0 PDS - running from the top and interrupted 0 PDS from the bottom due to concerns about her fascial integrity. The incision was irrigated copiously and skin was approximated with staples. The stitch in the ileostomy was removed. Ostomy appliances were placed over both the ileostomy and new ileal conduit.      The patient was awoken from general anesthesia and transferred to the recovery room in stable condition. She was be admitted to the floor for cares as we await return of bowel function. Dr. Akhil Causey, attending of record, was present and available during the entirety of the procedure.     As the attending surgeon I, Akhil Causey, was present and scrubbed throughout the procedure.

## 2019-07-11 NOTE — ADDENDUM NOTE
Addendum  created 07/11/19 1614 by Zeinab Mohan APRN CRNA    Visit Navigator Flowsheet section accepted

## 2019-07-11 NOTE — PROGRESS NOTES
07/11/19 1100   Quick Adds   Type of Visit Initial Occupational Therapy Evaluation   Living Environment   Lives With spouse   Living Arrangements house   Home Accessibility stairs to enter home;stairs within home   Number of Stairs, Main Entrance 3   Stair Railings, Main Entrance railing on right side (ascending)   Number of Stairs, Within Home, Primary 10  (bedroom on 2nd floor)   Transportation Anticipated car, drives self;family or friend will provide   Living Environment Comment Pt lives with her . Pt's  states his work is flexible and he can assist PRN. Pt's bedroom is on 2nd floor however has a recliner she is able to sleep on that's on the main floor if needed. Pt with walk-in shower and has shower chair available if needed.    Self-Care   Usual Activity Tolerance good   Current Activity Tolerance good   Equipment Currently Used at Home none   Activity/Exercise/Self-Care Comment Pt independent with all ADLs/IADLs at baseline. Pt does not use any DME for ADLs or mobility.    Functional Level   Ambulation 0-->independent   Transferring 0-->independent   Toileting 0-->independent   Bathing 0-->independent   Dressing 0-->independent   Eating 0-->independent   Communication 0-->understands/communicates without difficulty   Swallowing 0-->swallows foods/liquids without difficulty   Cognition 0 - no cognition issues reported   Fall history within last six months no   General Information   Onset of Illness/Injury or Date of Surgery - Date 07/10/19   Referring Physician Martínez Reilly MD   Patient/Family Goals Statement return home    Additional Occupational Profile Info/Pertinent History of Current Problem 49 year old y/o female POD#1 s/p ileal conduit takedown, CATRINA, looposcopy, ureterolysis, and creation of new ileal conduit for stomal stenosis and symptomatic L hydro.    Precautions/Limitations abdominal precautions;oxygen therapy device and L/min   Cognitive Status Examination   Orientation  orientation to person, place and time   Level of Consciousness alert   Follows Commands (Cognition) WNL   Visual Perception   Visual Perception No deficits were identified   Sensory Examination   Sensory Quick Adds No deficits were identified   Pain Assessment   Patient Currently in Pain Yes, see Vital Sign flowsheet   Range of Motion (ROM)   ROM Comment Pt with limited shoulder AROM due to pain, WFL in all other planes of motion.    Strength   Strength Comments Not formally assessed due to post op precautions, pt appears grossly 3/5 MMT or greater    Mobility   Bed Mobility Comments max A for supine > EOB with VCs for log roll technique    Transfer Skill: Bed to Chair/Chair to Bed   Level of Summers: Bed to Chair minimum assist (75% patients effort)   Physical Assist/Nonphysical Assist: Bed to Chair 1 person assist   Balance   Balance Comments Pt with forward flexed posture and shuffled gait, pt able to take ~3-4 side steps with CGA-min A.    Lower Body Dressing   Level of Summers: Dress Lower Body maximum assist (25% patients effort)   Activities of Daily Living Analysis   Impairments Contributing to Impaired Activities of Daily Living balance impaired;pain;post surgical precautions;strength decreased   General Therapy Interventions   Planned Therapy Interventions ADL retraining;IADL retraining;balance training;bed mobility training;strengthening;transfer training;home program guidelines;progressive activity/exercise;risk factor education   Clinical Impression   Criteria for Skilled Therapeutic Interventions Met yes, treatment indicated   OT Diagnosis decreased ADL I    Influenced by the following impairments pain, post op precautions, weakness, deconditioning    Assessment of Occupational Performance 5 or more Performance Deficits   Identified Performance Deficits bed mobility, dressing, bathing, toileting, home management, mobility    Clinical Decision Making (Complexity) Low complexity   Therapy  "Frequency Daily   Predicted Duration of Therapy Intervention (days/wks) 1 week    Anticipated Discharge Disposition Transitional Care Facility   Risks and Benefits of Treatment have been explained. Yes   Patient, Family & other staff in agreement with plan of care Yes   Clinical Impression Comments Pt presents with pain, post op precautions, weakness, deconditioning leading to decreased ADL I. Pt to benefit from continued OT intervention to address the above stated deficits.    [facility] AM-PAC TM \"6 Clicks\"   2016, Trustees of [facility], under license to [employer].  All rights reserved.   6 Clicks Short Forms Daily Activity Inpatient Short Form   [facility] AM-PAC  \"6 Clicks\" Daily Activity Inpatient Short Form   1. Putting on and taking off regular lower body clothing? 2 - A Lot   2. Bathing (including washing, rinsing, drying)? 2 - A Lot   3. Toileting, which includes using toilet, bedpan or urinal? 2 - A Lot   4. Putting on and taking off regular upper body clothing? 2 - A Lot   5. Taking care of personal grooming such as brushing teeth? 3 - A Little   6. Eating meals? 3 - A Little   Daily Activity Raw Score (Score out of 24.Lower scores equate to lower levels of function) 14   Total Evaluation Time   Total Evaluation Time (Minutes) 5     "

## 2019-07-11 NOTE — PROGRESS NOTES
REGIONAL ANESTHESIA PAIN SERVICE (RAPS) EVALUATION:  - Time: 10:35PM.  Patient evaluated bedside while in PACU. Epidural at 6ml/h. Patient complaining of incisional pain. BP on low side, but stable, MAPs consistently above 65 with no interventions while in PACU.    - Evaluation: Patient reports pain intensity with current therapy 7/10 at rest  General: alert and moderate distress  Catheter system integrity: intact.   Skin: T7-8 epidural catheter, dressing clean, dry and intact  Current vitals: /60, MAP 74, , RR 14    - Assessment/Plan    PAIN: poorly controlled at this time.     INTERVENTION:    Bolus administered    MEDICATION: PF bupivacaine 0.25%, total bolus 5 mL via epidural.     PROCEDURE: Clinician bolus via epidural catheter; administered without complication with negative aspirate before and between each mL.    No symptoms of local anesthetic systemic toxicity (LAST). Remained with and assessed patient for 10  min post-injection. BP, P and MAP stable.     POST-PROCEDURE: Bedside PACU RN aware of need to continue BP, P and MAP monitoring Q 10 min for an additional 30 min. Frequent BP monitoring will continue after transfer to . Contact RAPS if any of the following: patient experiencing any untoward effects, SBP< 90, P < 50 or > 120, MAP < 60     - patient can be evaluated to receive local anesthetic bolus Q 12 hr PRN pain not controlled with continuous infusion.  Bedside nurse must page RAPS to request bolus    Klarissa Molina MD  CA2 Anesthesia Resident.     RAPS Contact Info (24 hour job code pager is the last 4 digits) For in-house use only:  IN[employer] phone: [place] [phone], [place] [phone], [facility] [phone], then enter call-back number.    Text: Use [employer] on the Intranet <Paging/Directory> tab and enter Jobcode ID.   If no call back at any time, contact the hospital  and ask for RAPS attending or backup

## 2019-07-11 NOTE — PLAN OF CARE
Afebrile, VSS on 2L NC.  Alert and oriented x4.  Pain managed with Dilaudid PCA 0.3 q10 Min's.  Bupivacaine drip at 6 ml/hr via epidural line with CDI dressing. Has Dilaudid PCA. Denies nausea.  L colostomy bag intact, small amount og dark liquid stool.  R ileal conduit with 1 stent to urostomy bag draining gloria urine to drainage bag.  LEANDRO drain to bulb suction with bloody output.  Abdominal dressing CDI.  Alert and oriented x 4.  Hypotensive and tachycardic. 1 liter LR bolus given for lactic of 3.2, recheck 1.9.  Up in chair for about 2 hr's today.   at bedside.  Continue plan of care.

## 2019-07-12 ENCOUNTER — APPOINTMENT (OUTPATIENT)
Dept: OCCUPATIONAL THERAPY | Facility: CLINIC | Age: 49
End: 2019-07-12
Attending: UROLOGY
Payer: COMMERCIAL

## 2019-07-12 ENCOUNTER — APPOINTMENT (OUTPATIENT)
Dept: PHYSICAL THERAPY | Facility: CLINIC | Age: 49
End: 2019-07-12
Attending: UROLOGY
Payer: COMMERCIAL

## 2019-07-12 ENCOUNTER — APPOINTMENT (OUTPATIENT)
Dept: GENERAL RADIOLOGY | Facility: CLINIC | Age: 49
End: 2019-07-12
Attending: UROLOGY
Payer: COMMERCIAL

## 2019-07-12 LAB
ANION GAP SERPL CALCULATED.3IONS-SCNC: 4 MMOL/L (ref 3–14)
BUN SERPL-MCNC: 9 MG/DL (ref 7–30)
CALCIUM SERPL-MCNC: 6.8 MG/DL (ref 8.5–10.1)
CHLORIDE SERPL-SCNC: 111 MMOL/L (ref 94–109)
CO2 SERPL-SCNC: 25 MMOL/L (ref 20–32)
CREAT SERPL-MCNC: 0.85 MG/DL (ref 0.52–1.04)
ERYTHROCYTE [DISTWIDTH] IN BLOOD BY AUTOMATED COUNT: 13.8 % (ref 10–15)
ERYTHROCYTE [DISTWIDTH] IN BLOOD BY AUTOMATED COUNT: 13.8 % (ref 10–15)
GFR SERPL CREATININE-BSD FRML MDRD: 80 ML/MIN/{1.73_M2}
GLUCOSE BLDC GLUCOMTR-MCNC: 113 MG/DL (ref 70–99)
GLUCOSE SERPL-MCNC: 113 MG/DL (ref 70–99)
HCT VFR BLD AUTO: 24.3 % (ref 35–47)
HCT VFR BLD AUTO: 24.6 % (ref 35–47)
HGB BLD-MCNC: 7.5 G/DL (ref 11.7–15.7)
HGB BLD-MCNC: 7.6 G/DL (ref 11.7–15.7)
LACTATE BLD-SCNC: 1.6 MMOL/L (ref 0.7–2)
MAGNESIUM SERPL-MCNC: 1.5 MG/DL (ref 1.6–2.3)
MAGNESIUM SERPL-MCNC: 2.9 MG/DL (ref 1.6–2.3)
MCH RBC QN AUTO: 28.5 PG (ref 26.5–33)
MCH RBC QN AUTO: 29.1 PG (ref 26.5–33)
MCHC RBC AUTO-ENTMCNC: 30.9 G/DL (ref 31.5–36.5)
MCHC RBC AUTO-ENTMCNC: 30.9 G/DL (ref 31.5–36.5)
MCV RBC AUTO: 92 FL (ref 78–100)
MCV RBC AUTO: 94 FL (ref 78–100)
PHOSPHATE SERPL-MCNC: 2.4 MG/DL (ref 2.5–4.5)
PLATELET # BLD AUTO: 172 10E9/L (ref 150–450)
PLATELET # BLD AUTO: 177 10E9/L (ref 150–450)
POTASSIUM SERPL-SCNC: 3.2 MMOL/L (ref 3.4–5.3)
POTASSIUM SERPL-SCNC: 3.8 MMOL/L (ref 3.4–5.3)
RBC # BLD AUTO: 2.58 10E12/L (ref 3.8–5.2)
RBC # BLD AUTO: 2.67 10E12/L (ref 3.8–5.2)
SODIUM SERPL-SCNC: 140 MMOL/L (ref 133–144)
WBC # BLD AUTO: 10.2 10E9/L (ref 4–11)
WBC # BLD AUTO: 9.5 10E9/L (ref 4–11)

## 2019-07-12 PROCEDURE — 71045 X-RAY EXAM CHEST 1 VIEW: CPT

## 2019-07-12 PROCEDURE — 12000001 ZZH R&B MED SURG/OB UMMC

## 2019-07-12 PROCEDURE — 40000901 ZZH STATISTIC WOC PT EDUCATION, 0-15 MIN

## 2019-07-12 PROCEDURE — 80048 BASIC METABOLIC PNL TOTAL CA: CPT | Performed by: STUDENT IN AN ORGANIZED HEALTH CARE EDUCATION/TRAINING PROGRAM

## 2019-07-12 PROCEDURE — 84132 ASSAY OF SERUM POTASSIUM: CPT | Performed by: UROLOGY

## 2019-07-12 PROCEDURE — 97530 THERAPEUTIC ACTIVITIES: CPT | Mod: GO

## 2019-07-12 PROCEDURE — 25800030 ZZH RX IP 258 OP 636: Performed by: PHYSICIAN ASSISTANT

## 2019-07-12 PROCEDURE — 36415 COLL VENOUS BLD VENIPUNCTURE: CPT | Performed by: STUDENT IN AN ORGANIZED HEALTH CARE EDUCATION/TRAINING PROGRAM

## 2019-07-12 PROCEDURE — 25000132 ZZH RX MED GY IP 250 OP 250 PS 637: Performed by: PHYSICIAN ASSISTANT

## 2019-07-12 PROCEDURE — 87040 BLOOD CULTURE FOR BACTERIA: CPT | Performed by: STUDENT IN AN ORGANIZED HEALTH CARE EDUCATION/TRAINING PROGRAM

## 2019-07-12 PROCEDURE — 00000146 ZZHCL STATISTIC GLUCOSE BY METER IP

## 2019-07-12 PROCEDURE — 97535 SELF CARE MNGMENT TRAINING: CPT | Mod: GO

## 2019-07-12 PROCEDURE — 25800025 ZZH RX 258: Performed by: PHYSICIAN ASSISTANT

## 2019-07-12 PROCEDURE — 83735 ASSAY OF MAGNESIUM: CPT | Performed by: UROLOGY

## 2019-07-12 PROCEDURE — 83605 ASSAY OF LACTIC ACID: CPT | Performed by: UROLOGY

## 2019-07-12 PROCEDURE — 97116 GAIT TRAINING THERAPY: CPT | Mod: GP | Performed by: PHYSICAL THERAPIST

## 2019-07-12 PROCEDURE — 25000125 ZZHC RX 250: Performed by: PHYSICIAN ASSISTANT

## 2019-07-12 PROCEDURE — 85027 COMPLETE CBC AUTOMATED: CPT | Performed by: STUDENT IN AN ORGANIZED HEALTH CARE EDUCATION/TRAINING PROGRAM

## 2019-07-12 PROCEDURE — 84100 ASSAY OF PHOSPHORUS: CPT | Performed by: STUDENT IN AN ORGANIZED HEALTH CARE EDUCATION/TRAINING PROGRAM

## 2019-07-12 PROCEDURE — 83605 ASSAY OF LACTIC ACID: CPT

## 2019-07-12 PROCEDURE — 36592 COLLECT BLOOD FROM PICC: CPT | Performed by: STUDENT IN AN ORGANIZED HEALTH CARE EDUCATION/TRAINING PROGRAM

## 2019-07-12 PROCEDURE — 84100 ASSAY OF PHOSPHORUS: CPT | Performed by: PHYSICIAN ASSISTANT

## 2019-07-12 PROCEDURE — 25000128 H RX IP 250 OP 636: Performed by: STUDENT IN AN ORGANIZED HEALTH CARE EDUCATION/TRAINING PROGRAM

## 2019-07-12 PROCEDURE — 36592 COLLECT BLOOD FROM PICC: CPT | Performed by: UROLOGY

## 2019-07-12 PROCEDURE — 83735 ASSAY OF MAGNESIUM: CPT | Performed by: STUDENT IN AN ORGANIZED HEALTH CARE EDUCATION/TRAINING PROGRAM

## 2019-07-12 PROCEDURE — 25000128 H RX IP 250 OP 636: Performed by: PHYSICIAN ASSISTANT

## 2019-07-12 PROCEDURE — 25800030 ZZH RX IP 258 OP 636: Performed by: STUDENT IN AN ORGANIZED HEALTH CARE EDUCATION/TRAINING PROGRAM

## 2019-07-12 PROCEDURE — 97530 THERAPEUTIC ACTIVITIES: CPT | Mod: GP | Performed by: PHYSICAL THERAPIST

## 2019-07-12 RX ORDER — POTASSIUM CHLORIDE 29.8 MG/ML
20 INJECTION INTRAVENOUS
Status: DISCONTINUED | OUTPATIENT
Start: 2019-07-12 | End: 2019-07-21 | Stop reason: HOSPADM

## 2019-07-12 RX ORDER — DEXTROSE MONOHYDRATE, SODIUM CHLORIDE, AND POTASSIUM CHLORIDE 50; 1.49; 9 G/1000ML; G/1000ML; G/1000ML
INJECTION, SOLUTION INTRAVENOUS CONTINUOUS
Status: DISCONTINUED | OUTPATIENT
Start: 2019-07-12 | End: 2019-07-14

## 2019-07-12 RX ORDER — ACETAMINOPHEN 325 MG/1
975 TABLET ORAL EVERY 8 HOURS
Status: DISPENSED | OUTPATIENT
Start: 2019-07-12 | End: 2019-07-17

## 2019-07-12 RX ORDER — POTASSIUM CL/LIDO/0.9 % NACL 10MEQ/0.1L
10 INTRAVENOUS SOLUTION, PIGGYBACK (ML) INTRAVENOUS
Status: DISCONTINUED | OUTPATIENT
Start: 2019-07-12 | End: 2019-07-21 | Stop reason: HOSPADM

## 2019-07-12 RX ORDER — MAGNESIUM SULFATE HEPTAHYDRATE 40 MG/ML
4 INJECTION, SOLUTION INTRAVENOUS EVERY 4 HOURS PRN
Status: DISCONTINUED | OUTPATIENT
Start: 2019-07-12 | End: 2019-07-21 | Stop reason: HOSPADM

## 2019-07-12 RX ADMIN — BUPIVACAINE HYDROCHLORIDE: 7.5 INJECTION, SOLUTION EPIDURAL; RETROBULBAR at 05:15

## 2019-07-12 RX ADMIN — SODIUM CHLORIDE: 9 INJECTION, SOLUTION INTRAVENOUS at 01:26

## 2019-07-12 RX ADMIN — POTASSIUM CHLORIDE, DEXTROSE MONOHYDRATE AND SODIUM CHLORIDE: 150; 5; 900 INJECTION, SOLUTION INTRAVENOUS at 17:44

## 2019-07-12 RX ADMIN — KETOROLAC TROMETHAMINE 1 DROP: 4 SOLUTION/ DROPS OPHTHALMIC at 08:02

## 2019-07-12 RX ADMIN — POTASSIUM CHLORIDE 20 MEQ: 29.8 INJECTION, SOLUTION INTRAVENOUS at 11:09

## 2019-07-12 RX ADMIN — POTASSIUM CHLORIDE 20 MEQ: 29.8 INJECTION, SOLUTION INTRAVENOUS at 09:59

## 2019-07-12 RX ADMIN — POTASSIUM CHLORIDE 20 MEQ: 29.8 INJECTION, SOLUTION INTRAVENOUS at 17:44

## 2019-07-12 RX ADMIN — ACETAMINOPHEN 975 MG: 325 TABLET, FILM COATED ORAL at 21:24

## 2019-07-12 RX ADMIN — ACETAMINOPHEN 975 MG: 325 SOLUTION ORAL at 08:03

## 2019-07-12 RX ADMIN — PREDNISOLONE ACETATE 1 DROP: 10 SUSPENSION/ DROPS OPHTHALMIC at 14:18

## 2019-07-12 RX ADMIN — POTASSIUM CHLORIDE, DEXTROSE MONOHYDRATE AND SODIUM CHLORIDE: 150; 5; 900 INJECTION, SOLUTION INTRAVENOUS at 08:02

## 2019-07-12 RX ADMIN — KETOROLAC TROMETHAMINE 1 DROP: 4 SOLUTION/ DROPS OPHTHALMIC at 19:58

## 2019-07-12 RX ADMIN — Medication: at 23:31

## 2019-07-12 RX ADMIN — ACETAMINOPHEN 975 MG: 325 TABLET, FILM COATED ORAL at 14:17

## 2019-07-12 RX ADMIN — MAGNESIUM SULFATE HEPTAHYDRATE 4 G: 40 INJECTION, SOLUTION INTRAVENOUS at 14:00

## 2019-07-12 RX ADMIN — POTASSIUM PHOSPHATE, MONOBASIC AND POTASSIUM PHOSPHATE, DIBASIC 15 MMOL: 224; 236 INJECTION, SOLUTION INTRAVENOUS at 16:27

## 2019-07-12 RX ADMIN — KETOROLAC TROMETHAMINE 1 DROP: 4 SOLUTION/ DROPS OPHTHALMIC at 14:18

## 2019-07-12 RX ADMIN — PREDNISOLONE ACETATE 1 DROP: 10 SUSPENSION/ DROPS OPHTHALMIC at 19:58

## 2019-07-12 RX ADMIN — PREDNISOLONE ACETATE 1 DROP: 10 SUSPENSION/ DROPS OPHTHALMIC at 08:02

## 2019-07-12 ASSESSMENT — ACTIVITIES OF DAILY LIVING (ADL)
ADLS_ACUITY_SCORE: 14
ADLS_ACUITY_SCORE: 10
ADLS_ACUITY_SCORE: 14
ADLS_ACUITY_SCORE: 10
ADLS_ACUITY_SCORE: 10
ADLS_ACUITY_SCORE: 14

## 2019-07-12 NOTE — PROGRESS NOTES
REGIONAL ANESTHESIA PAIN SERVICE EPIDURAL NOTE  Karo Lindsay is a 49 year old female POD #2 s/p REVISION, URINARY CONDUIT and placement of T7-8 epidural catheter for pain management.      SUBJECTIVE  Interval History: Overnight events:  Last bolus was 7/12/19 at 0125.  Patient reports adequate pain control with epidural infusion and current  analgesic medications (see below).  Denies weakness, paresthesias, circumoral numbness, metallic taste or tinnitus.  Patient is ambulating with assistance.  Currently NPO with NG tube clamped, denies nausea or vomiting, urostomy with dark UOP.                 Clinically Aligned Pain Assessment (CAPA):  Comfort (How is your pain?): comfortable  Change in Pain (Since your last medication/intervention?): same  Pain Control (How are your pain treatments working?):  Partially effective pain control  Functioning (Are you able to do activities to get better?) : Can do most things, but pain gets in the way of some   Sleep (Does your pain management allow you to sleep or rest?): Awake with occasional pain                 Pain Intensity using Numerical Rating Scale (NRS):    2/10 at rest and 3/10 with activity        Antithrombotic/Thrombolytic Therapy ordered:  none     Analgesic Medications:              Medications related to Pain Management (From now, onward)     Start     Dose/Rate Route Frequency Ordered Stop     07/11/19 1315   acetaminophen (TYLENOL) solution 975 mg      975 mg Oral 3 TIMES DAILY 07/11/19 1304 07/16/19 1359     07/11/19 1315   HYDROmorphone (DILAUDID) PCA 1 mg/mL OPIOID NAIVE        Intravenous CONTINUOUS 07/11/19 1306       07/11/19 0800   senna-docusate (SENOKOT-S/PERICOLACE) 8.6-50 MG per tablet 1 tablet      1 tablet Oral 2 TIMES DAILY 07/11/19 0017       07/11/19 0800   senna-docusate (SENOKOT-S/PERICOLACE) 8.6-50 MG per tablet 2 tablet      2 tablet Oral 2 TIMES DAILY 07/11/19 0017       07/11/19 0017   lidocaine 1 % 0.1-1 mL      0.1-1 mL Other EVERY 1  "HOUR PRN 07/11/19 0017       07/11/19 0017   lidocaine (LMX4) cream        Topical EVERY 1 HOUR PRN 07/11/19 0017       07/11/19 0017   acetaminophen (TYLENOL) tablet 650 mg      650 mg Oral EVERY 4 HOURS PRN 07/11/19 0017       07/10/19 1315   bupivacaine (MARCAINE) 0.125 % in sodium chloride 0.9 % 250 mL EPIDURAL Infusion      8 mL/hr  EPIDURAL CONTINUOUS 07/10/19 1310               OBJECTIVE  Lab Results:       Recent Labs   Lab Test 07/12/19  0548   WBC 9.5   RBC 2.67*   HGB 7.6*   HCT 24.6*   MCV 92   MCH 28.5   MCHC 30.9*   RDW 13.8            Lab Results   Component Value Date     INR 1.0 04/24/2018         Vitals:              Temp:  [96.4  F (35.8  C)-100  F (37.8  C)] 99.5  F (37.5  C)  Heart Rate:  [103-118] 118  Resp:  [14-16] 16  BP: (103-129)/(46-58) 125/54  SpO2:  [96 %-100 %] 99 %  /54 (BP Location: Left leg)   Pulse 114   Temp 99.5  F (37.5  C) (Oral)   Resp 16   Ht 1.473 m (4' 10\")   Wt 69.4 kg (153 lb)   SpO2 99%   BMI 31.98 kg/m          Exam:   GEN: alert and no distress sitting in the chair  NEURO/MSK:Strength B/L LE 5/5  and overall symmetric  SKIN: Epidural catheter site with dressing c/d/i, no tenderness, erythema, heme, edema       ASSESSMENT/PLAN:    Patient is receiving adequate analgesia with current multimodal therapy including T7-8 epidural catheter infusion of Bupivacaine 0.125% at 8mL/hr.  Pt is ambulating with assistance.  No evidence of adverse side effects related to local anesthetic. Urostomy with dark UOP.  Epidural is not covering L) UQ pain but PCA is effective in reducing the patient to a tolerable level.     - continue current epidural infusion Bupivacaine 0.1225% at 8mL/hour, POD #2  - antithrombotic/thrombolytic therapy: none ordered. Please contact RAPS (#[phone]) prior to any medication changes  - will continue to follow and adjust as needed      Loki Miller MD  Perioperative and Interventional Pain Service  7/12/2019 3:08 PM  PIPS 24-hour Job " Code Pagers (for in-house use only, may text page using [employer])  [place]:  [phone]  [place]:  [phone]  Peds PIPS: [phone]

## 2019-07-12 NOTE — PLAN OF CARE
OT/5A:  Discharge Planner OT   Patient plan for discharge: Hoping to return home with  assist.   Current status: Patient continues to be limited by pain, but motivated to attempt short ambulation bout in room. Max A bed mobility. Max A LB dressing and donning gown. Min Ax2 sit <> stand. Slowly ambulating ~10 feet around bed to recliner with 2WW and Min A. Hypotensive throughout session, but denies dizzines. /62. HR up to 125 with activity, decreasing to 100's. Educated on maintaining abdominal precautions throughout.   Barriers to return to prior living situation: Pain, Abdominal Precautions, Level of A for ADL/Mobility   Recommendations for discharge: TCU currently.   Rationale for recommendations: Anticipate patient will make good progress with therapies with continued pain management. May progress to home with assist. However, given current level of mobility, would benefit from continued therapies to promote increased ADL independence.         Entered by: Kourtney Cruz 07/12/2019 9:47 AM

## 2019-07-12 NOTE — PROGRESS NOTES
REGIONAL ANESTHESIA PAIN SERVICE EPIDURAL NOTE  Karo Lindsay is a 49 year old female POD #1 s/p REVISION, URINARY CONDUIT and placement of T7-8 epidural catheter for pain management.      SUBJECTIVE  Interval History: Overnight events: Hypotensive, tachycardic overnight. Triggered sepsis protocol with lactate of 6.9 and rapid response called. Patient reports moderte pain control with epidural infusion and current  analgesic medications (see below).  Denies weakness, paresthesias, circumoral numbness, metallic taste or tinnitus.  Patient has not been OOB yet this am.  Currently NPO, denies nausea or vomiting.  Urostomy with dark urine output.                 Clinically Aligned Pain Assessment (CAPA):  Comfort (How is your pain?): Tolerable with discomfort  Change in Pain (Since your last medication/intervention?): About the same  Pain Control (How are your pain treatments working?):  Partially effective pain control  Functioning (Are you able to do activities to get better?) : Can do most things, but pain gets in the way of some   Sleep (Does your pain management allow you to sleep or rest?): Awake with occasional pain                 Pain Intensity using Numerical Rating Scale (NRS):    2/10 at rest and 5/10 with activity        Antithrombotic/Thrombolytic Therapy ordered:  none     Analgesic Medications:              Medications related to Pain Management (From now, onward)     Start     Dose/Rate Route Frequency Ordered Stop     07/11/19 0800   senna-docusate (SENOKOT-S/PERICOLACE) 8.6-50 MG per tablet 1 tablet      1 tablet Oral 2 TIMES DAILY 07/11/19 0017       07/11/19 0800   senna-docusate (SENOKOT-S/PERICOLACE) 8.6-50 MG per tablet 2 tablet      2 tablet Oral 2 TIMES DAILY 07/11/19 0017       07/11/19 0017   lidocaine 1 % 0.1-1 mL      0.1-1 mL Other EVERY 1 HOUR PRN 07/11/19 0017       07/11/19 0017   lidocaine (LMX4) cream        Topical EVERY 1 HOUR PRN 07/11/19 0017       07/11/19 0017   acetaminophen  "(TYLENOL) tablet 650 mg      650 mg Oral EVERY 4 HOURS PRN 07/11/19 0017       07/10/19 2100   HYDROmorphone (DILAUDID) PCA 1 mg/mL OPIOID NAIVE        Intravenous CONTINUOUS 07/10/19 2053       07/10/19 1315   bupivacaine (MARCAINE) 0.125 % in sodium chloride 0.9 % 250 mL EPIDURAL Infusion      6 mL/hr  EPIDURAL CONTINUOUS 07/10/19 1310               OBJECTIVE  Lab Results:       Recent Labs   Lab Test 07/11/19  0437   WBC 16.5*   RBC 3.45*   HGB 10.0*   HCT 31.5*   MCV 91   MCH 29.0   MCHC 31.7   RDW 13.2                  Lab Results   Component Value Date     INR 1.0 04/24/2018         Vitals:              Temp:  [96.8  F (36  C)-99.5  F (37.5  C)] 99.5  F (37.5  C)  Pulse:  [] 114  Heart Rate:  [] 115  Resp:  [11-25] 12  BP: ()/(30-89) 96/45  SpO2:  [93 %-100 %] 96 %  BP 96/45 (BP Location: Left arm)   Pulse 114   Temp 99.5  F (37.5  C) (Axillary)   Resp 12   Ht 1.473 m (4' 10\")   Wt 69.4 kg (153 lb)   SpO2 96%   BMI 31.98 kg/m          Exam:   GEN: alert and no distress  NEURO/MSK:Strength B/L LE 5/5  and overall symmetric  SKIN: Epidural catheter site with dressing c/d/i, no tenderness, erythema, heme, edema       ASSESSMENT/PLAN:    Patient is receiving adequate analgesia with current multimodal therapy including T7-8 epidural catheter infusion of Bupivacaine 0.125% at 6mL/hr.  Pt has not been OOB yet this am.  No evidence of adverse side effects related to local anesthetic.     - continue current epidural infusion Bupivacaine 0.125% at 6mL/hour, POD #1-MAP upper 60's  - antithrombotic/thrombolytic therapy: none. Please contact RAPS (#[phone]) prior to any medication changes  - will continue to follow and adjust as needed    Loki Miller MD  Perioperative and Interventional Pain Service  7/11/2019 3:09 PM  PIPS 24-hour Job Code Pagers (for in-house use only, may text page using [employer])  [place]:  [phone]  [place]:  [phone]  Peds [facility]: [phone]        "

## 2019-07-12 NOTE — PLAN OF CARE
[id number]:   Reason for admission: Post-op stenosis of ileal conduit   Vitals: afebrile, VSS on 2L NC   Activity: A1-2  Pain: Dilaudid PCA 0.3 q10 Min's.  Bupivacaine drip-epidural line continueous @8 ml/hr  Neuro: AOx4  Cardiac: WNL.   Respiratory: LS clear, dimin at bases, attempt to wean off O2 unsuccessful, satting mid to high 90s on 2L NC  GI/: L colostomy bag intact, small dark liquid stool. R ileal conduit (1 stent) to urostomy bag to joshi bag-gloria output.   Diet:  NPO ex chips  Lines: DL PICC one SL, one NS@150/hr. (Dilaudid PCA Y sited)  Epidural Bupivacaine drip @8 ml/hr. Colostomy, urostomy.     Wounds: epidural site CDI.  LEANDRO drain to bulb suction with serosanguinous output, site CDI. Abdominal dressing CDI.    Labs/imaging: WBC 11.9, Hgb stable@9.1, last LA 1.9.      New changes this shift: Epidural rate increased from 6 to 8 ml/hr per anaesthetist . Pt states pain feels better controlled although slightly drowsy. Denies nausea, able to sleep comfortably between cares, up to chair x1 this shift.  Uses PCA appropriately.  Edi at bedside.    Plan: Discharge home w [facility] in 5-7 days.  Continue plan of care

## 2019-07-12 NOTE — PLAN OF CARE
5A:  Discharge Planner PT   Patient plan for discharge: home with assist  Current status: Requiring mod A for STS and stand pivot transfers. Ambulated 50ft with FWW min A x1 and wheelchair follow. Overall, pt's limited mobility is secondary to increased pain.   Barriers to return to prior living situation: deconditioning, pain management, medical status   Recommendations for discharge: TCU, home with assist pending LOS  Rationale for recommendations: Currently pt is very limited with mobility due to pain and would benefit from continued PT intervention in order to regain functional independence. However pt was independent at baseline and anticipate pt progressing quickly in order for her to return home with assist, pending LOS.        Entered by: Leyla Payton 07/12/2019 2:15 PM

## 2019-07-12 NOTE — PROGRESS NOTES
REGIONAL ANESTHESIA PAIN SERVICE (RAPS) EVALUATION:  - Time: 01:25AM.  Called to evaluate patient for help with pain control.   - Evaluation: Patient reports pain intensity with current therapy 3/10 at rest and 5/10 with activity  General: healthy, alert, anxious.   Catheter system integrity: Intact.   Skin: dressing clean, dry and intact    Current vitals: /57, P 100, RR 16    - Assessment/Plan    PAIN:  Well controlled with current reg. Patient request bolus to optimize pain control at bedtime.    INTERVENTION:    Bolus administered    MEDICATION: PF bupivacaine 0.25%, total bolus 6 mL via epidural  Catheter.     PROCEDURE: Clinician bolus via epidural catheter; administered without complication with negative aspirate before and between each mL.    No symptoms of local anesthetic systemic toxicity (LAST). Remained with and assessed patient for  min post-injection. BP, P and MAP stable    POST-PROCEDURE: Bedside RN aware of need to continue BP, P and MAP monitoring Q 10 min for an additional 30 min. Contact RAPS  if any of the following: patient experiencing any untoward effects, SBP< 90, P < 50 or > 120, MAP < 60     Klarissa Molina MD  CA2 Anesthesia Resident.         RAPS Contact Info (24 hour job code pager is the last 4 digits) For in-house use only:  [employer] phone: [place] [phone], [place] [phone], [email or web address] [phone], then enter call-back number.    Text: Use [employer] on the Intranet <Paging/Directory> tab and enter Jobcode ID.   If no call back at any time, contact the hospital  and ask for RAPS attending or backup

## 2019-07-12 NOTE — PROGRESS NOTES
WOC Nurse Inpatient [facility]   WOC Nurse Inpatient Adult     Per patient pouches have remained intact, would rather wait to change until the weekend to change them, ensured patient had all necessary supplies for both ostomy pouch changes in room. Marked one bucket ileostomy and one bucket urostomy and placed supplies appropriately to ensure no cross contamination occurs. Patient able to direct ostomy pouch cares.     Initial Assessment   Assessment of revised ileal conduit Stoma complications: none  and   established end Ileostomy Stoma complication(s) prolapse   Mucocutaneous junction; not assessed today   Peristomal complication(s) not assessed today   Pouch wear time:3-4 days,   Following today's visit:Patient is  able to demonstrate; patient has had ileal conduit and ileostomy since birth      1. How to empty their pouch? yes      2. How to change their pouch?  yes      3. How to read and record intake and output correctly? 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  Current Diet/Nutrition: Orders Placed This Encounter      Clear Liquid Diet     TPN no   I/O last 3 completed shifts:  In: 3635 [I.V.:3635]  Out: 1250 [Urine:975; Drains:125; Stool:150]  Labs:    Recent Labs   Lab 07/12/19  1203   HGB 7.5*   WBC 10.2        Physical Exam:  Ileal Conduit s/p revision on 7/10/19  Current pouching system:Nathalia two piece flat, placed in OR   Reason for pouch change today: pouch not changed today  Stoma appearance: viable, healthy, edematous  Stoma size; not measured today, one stent in place  Peristomal skin: not visualized (barrier in place)  Stoma output :gloria   Abdominal  Assessment  did not palpate today, pt in significant pain at time of assessment  , NG still in place? No  Surgical Site: dressing dry and  intact  Pain: Cramping and Sharp  Is patient still on a PCA Yes    Ileostomy, established   Current pouching system:Bonita two piece flat, placed in OR   Reason for pouch change today: pouch not changed today  Stoma appearance: viable, healthy, normal-appearing and prolapsed  Stoma size; not measured today  Peristomal skin: not visualized (barrier in place)  Stoma output :brown     Interventions:  Patient's chart evaluated.  Focus of today's visit: getting supplies for patient   Participant of teaching session today patient  and spouse  Change made with ostomy management today: No  Patient/family: lethargic and observing  Supplies:Ordered soft convex one piece fecal pouch, andreea ring and protective sheet. (for ileostomy)    Plan:  Learning needs: will return tomorrow to assist patient with pouch change   Preparation for discharge: No discharge preparations started  Recommend home care? patient very comfortable with pouch change procedures, home RN not needed for ostomy teaching     Discussed plan of care with Patient and Nurse  Nursing to notify the Provider(s) and re-consult the WOC Nurse if new ostomy concerns or discharge planned before next planned WOC visit.    WOC Nurse will return: Monday  Face to face time: 15 minutes    Vonda Cameron

## 2019-07-12 NOTE — PROGRESS NOTES
"I saw and evaluated the patient and agree with the resident note and plan of care. She is tolerating clears. Her pain is controlled and she ambulated today. Progressing well. Will trend CBC daily but suspect dilutional.     Adriana Najera MD  Reconstructive Urology Fellow  --------------------------------------------------------------------------------------------------------------------------------------      Urology  Progress Note  Pain overnight, evaluated by RAPS, given epidural bolus w/ no complications. Pain control improved  - no NV  - up to chair yesterday    Exam  /55 (BP Location: Left leg)   Pulse 114   Temp 99.8  F (37.7  C) (Oral)   Resp 16   Ht 1.473 m (4' 10\")   Wt 69.4 kg (153 lb)   SpO2 100%   BMI 31.98 kg/m    No acute distress  Tachycardic, regular rhythm  Unlabored breathing on 2 L/min NC  Abdomen soft, non-distended, appropriately tender to palpation. L abdominal ileostomy w/ a scant brown liquid and gas. Laparotomy incision closed w/ dressing minimal shadowing  R abdominal urostomy with dark UOP, mucous in bag, stoma pink and viable with stent. Yellow urine in bag.   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     Neuro: PCA, epidural for pain control  CV: HDS  Pulm: incentive spirometry while awake  FEN/GI: NPO with ice chips, MIVF @ 150/hr,   Endo: DIRK   : monitor urine output  Heme/ID: Hgb 7.6 today, will recheck, BCx x2 7/11 NGTD (sepsis protocol), afebrile  Activity: Up with assist. Encourage ambulation.   PPx: SCDs    Seen and examined with the chief resident. Will discuss with Dr. Causey.    Fiordaliza Otero MD  Urology PGY-3         Contacting the Urology Team     Please use the following job codes to reach the Urology Team. " Note that you must use an in house phone and that job codes cannot receive text pages.     On weekdays, dial [phone] (or star-star-star [phone] on the new [employer] telephones) then [phone] to reach the Adult Urology resident or PA on call    On weekdays, dial [phone] (or star-star-star [phone] on the new [employer] telephones) then [phone] to reach the Pediatric Urology resident    On weeknights and weekends, dial [phone] (or star-star-star 777 on the new [employer] telephones) then [phone] to reach the Urology resident on call (for both Adult and Pediatrics)

## 2019-07-12 NOTE — ADDENDUM NOTE
Addendum  created 07/12/19 1510 by Travis Barnes MD    Cosign clinical note with attestation, Sign clinical note

## 2019-07-12 NOTE — PLAN OF CARE
0700 - 1900:     Reason for admission: s/p ileal conduit takedown, L stent exchange  Vitals: VSS ex tachy on 1 L O2  Activity: A1/2 + W and gait belt  Pain: c/o manageable abd pain with Bupivacaine epidural @ 8 mL/hr and Dilaudid PCA, scheduled po tylenol  Neuro: WNL ex soft speech  Cardiac: tachycardic  Respiratory: WNL ex ROMERO, on 1 L O2 and weaning  GI/: colostomy WNL, urostomy w/ 1 stent to gravity drainage, L abd LEANDRO drain to bulb suction, large midline incision with staple closure open to air, epidural CDI  Diet: Clear liquid, well tolerated  Lines: R dbl PICC infusing TKO with electrolyte replacements and D5 NS w/ 20 mEq K @ 100 mL/hr and dilaudid PCA, colostomy, urostomy, epidural  Wounds: midline incision WNL KYLE  Labs/imaging: Hgb 7.6 --> 7.5, WBC 10.2, K 3.2, Mg 1.5, Phos 2.4      New changes this shift: POD #2, s/p revision, urinary conduit and placement of T7-8 epidural cath - K, Mg and Phos all replaced per protocol, K recheck back @ 3.8 - replaced again and recheck in AM, IVF changed to include K and D5, practicing splinting with coughing well, up to chair most of shift     Plan: manage pain, monitor drains, discharge TCU when stable      Continue to monitor and follow POC

## 2019-07-12 NOTE — PLAN OF CARE
Alert and oriented x 4. Verbalized post op abdominal pain , managed with Bupivacaine epidural drip at 8 ml/hr and Dilaudid PCA. On 02 inhalation at 2 lpm via nasal cannula. Abdominal  and epidural dressings are clean, dry and intact. L colostomy bag is intact with liquid stool output. Abdominal drain to bulb suction has serosanguinous drainage. R ileal conduit with 1 stent draining to standard bag. Seen by Dr. Ruiz at 0130, Bupivacaine bolus given. Tolerating ice chips. Turned and repositioned overnight. Seen by Urology Resident this am, aware of Hgb dropped to 7.6.  at bedside overnight. Vital signs stable. Will continue to monitor and follow plan of care

## 2019-07-12 NOTE — PROGRESS NOTES
REGIONAL ANESTHESIA PAIN SERVICE EPIDURAL NOTE  Karo Lindsay is a 49 year old female POD #2 s/p REVISION, URINARY CONDUIT and placement of T7-8 epidural catheter for pain management.      SUBJECTIVE  Interval History: Overnight events:  Last bolus was 7/12/19 at 0125.  Patient reports adequate pain control with epidural infusion and current  analgesic medications (see below).  Denies weakness, paresthesias, circumoral numbness, metallic taste or tinnitus.  Patient is ambulating with assistance.  Currently NPO with NG tube clamped, denies nausea or vomiting, urostomy with dark UOP.     Clinically Aligned Pain Assessment (CAPA):  Comfort (How is your pain?): comfortable  Change in Pain (Since your last medication/intervention?): same  Pain Control (How are your pain treatments working?):  Partially effective pain control  Functioning (Are you able to do activities to get better?) : Can do most things, but pain gets in the way of some   Sleep (Does your pain management allow you to sleep or rest?): Awake with occasional pain     Pain Intensity using Numerical Rating Scale (NRS):    2/10 at rest and 3/10 with activity      Antithrombotic/Thrombolytic Therapy ordered:  none    Analgesic Medications:  Medications related to Pain Management (From now, onward)    Start     Dose/Rate Route Frequency Ordered Stop    07/11/19 1315  acetaminophen (TYLENOL) solution 975 mg      975 mg Oral 3 TIMES DAILY 07/11/19 1304 07/16/19 1359    07/11/19 1315  HYDROmorphone (DILAUDID) PCA 1 mg/mL OPIOID NAIVE       Intravenous CONTINUOUS 07/11/19 1306      07/11/19 0800  senna-docusate (SENOKOT-S/PERICOLACE) 8.6-50 MG per tablet 1 tablet      1 tablet Oral 2 TIMES DAILY 07/11/19 0017      07/11/19 0800  senna-docusate (SENOKOT-S/PERICOLACE) 8.6-50 MG per tablet 2 tablet      2 tablet Oral 2 TIMES DAILY 07/11/19 0017      07/11/19 0017  lidocaine 1 % 0.1-1 mL      0.1-1 mL Other EVERY 1 HOUR PRN 07/11/19 0017      07/11/19 0017  lidocaine  "(LMX4) cream       Topical EVERY 1 HOUR PRN 07/11/19 0017      07/11/19 0017  acetaminophen (TYLENOL) tablet 650 mg      650 mg Oral EVERY 4 HOURS PRN 07/11/19 0017      07/10/19 1315  bupivacaine (MARCAINE) 0.125 % in sodium chloride 0.9 % 250 mL EPIDURAL Infusion      8 mL/hr  EPIDURAL CONTINUOUS 07/10/19 1310             OBJECTIVE  Lab Results:   Recent Labs   Lab Test 07/12/19  0548   WBC 9.5   RBC 2.67*   HGB 7.6*   HCT 24.6*   MCV 92   MCH 28.5   MCHC 30.9*   RDW 13.8          Lab Results   Component Value Date    INR 1.0 04/24/2018       Vitals:    Temp:  [96.4  F (35.8  C)-100  F (37.8  C)] 99.5  F (37.5  C)  Heart Rate:  [103-118] 118  Resp:  [14-16] 16  BP: (103-129)/(46-58) 125/54  SpO2:  [96 %-100 %] 99 %  /54 (BP Location: Left leg)   Pulse 114   Temp 99.5  F (37.5  C) (Oral)   Resp 16   Ht 1.473 m (4' 10\")   Wt 69.4 kg (153 lb)   SpO2 99%   BMI 31.98 kg/m         Exam:   GEN: alert and no distress sitting in the chair  NEURO/MSK:Strength B/L LE 5/5  and overall symmetric  SKIN: Epidural catheter site with dressing c/d/i, no tenderness, erythema, heme, edema     ASSESSMENT/PLAN:    Patient is receiving adequate analgesia with current multimodal therapy including T7-8 epidural catheter infusion of Bupivacaine 0.125% at 8mL/hr.  Pt is ambulating with assistance.  No evidence of adverse side effects related to local anesthetic. Urostomy with dark UOP.  Epidural is not covering L) UQ pain but PCA is effective in reducing the patient to a tolerable level.    - continue current epidural infusion Bupivacaine 0.1225% at 8mL/hour, POD #2  - antithrombotic/thrombolytic therapy: none ordered. Please contact RAPS (#[id number]) prior to any medication changes  - will continue to follow and adjust as needed    - discussed plan with attending anesthesiologist    LISHA Scott CNP  Regional Anesthesia Pain Service  7/12/2019 7:02 AM    RAPS Contact Info (24 hour job code pager is the last 4 " digits) For in-house use only:   [employer] phone: [place] [phone], [place] [phone], Peds [phone], then enter call-back number.    Text: Use [employer] on the Intranet <Paging/Directory> tab and enter Jobcode ID.   If no call back at any time, contact the hospital  and ask for RAPS attending or backup

## 2019-07-13 ENCOUNTER — APPOINTMENT (OUTPATIENT)
Dept: PHYSICAL THERAPY | Facility: CLINIC | Age: 49
End: 2019-07-13
Attending: UROLOGY
Payer: COMMERCIAL

## 2019-07-13 ENCOUNTER — ANESTHESIA EVENT (OUTPATIENT)
Dept: VASCULAR ULTRASOUND | Facility: CLINIC | Age: 49
End: 2019-07-13

## 2019-07-13 LAB
ABO + RH BLD: NORMAL
ABO + RH BLD: NORMAL
ALBUMIN UR-MCNC: 30 MG/DL
ANION GAP SERPL CALCULATED.3IONS-SCNC: 5 MMOL/L (ref 3–14)
ANION GAP SERPL CALCULATED.3IONS-SCNC: 5 MMOL/L (ref 3–14)
APPEARANCE UR: ABNORMAL
BACTERIA #/AREA URNS HPF: ABNORMAL /HPF
BILIRUB UR QL STRIP: NEGATIVE
BLD GP AB SCN SERPL QL: NORMAL
BLD PROD TYP BPU: NORMAL
BLD PROD TYP BPU: NORMAL
BLD UNIT ID BPU: 0
BLOOD BANK CMNT PATIENT-IMP: NORMAL
BLOOD PRODUCT CODE: NORMAL
BPU ID: NORMAL
BUN SERPL-MCNC: 4 MG/DL (ref 7–30)
BUN SERPL-MCNC: 5 MG/DL (ref 7–30)
CALCIUM SERPL-MCNC: 6.7 MG/DL (ref 8.5–10.1)
CALCIUM SERPL-MCNC: 7 MG/DL (ref 8.5–10.1)
CHLORIDE SERPL-SCNC: 107 MMOL/L (ref 94–109)
CHLORIDE SERPL-SCNC: 108 MMOL/L (ref 94–109)
CO2 SERPL-SCNC: 23 MMOL/L (ref 20–32)
CO2 SERPL-SCNC: 23 MMOL/L (ref 20–32)
COLOR UR AUTO: ABNORMAL
CREAT SERPL-MCNC: 0.81 MG/DL (ref 0.52–1.04)
CREAT SERPL-MCNC: 0.98 MG/DL (ref 0.52–1.04)
ERYTHROCYTE [DISTWIDTH] IN BLOOD BY AUTOMATED COUNT: 13.5 % (ref 10–15)
ERYTHROCYTE [DISTWIDTH] IN BLOOD BY AUTOMATED COUNT: 13.7 % (ref 10–15)
ERYTHROCYTE [DISTWIDTH] IN BLOOD BY AUTOMATED COUNT: NORMAL % (ref 10–15)
GFR SERPL CREATININE-BSD FRML MDRD: 67 ML/MIN/{1.73_M2}
GFR SERPL CREATININE-BSD FRML MDRD: 85 ML/MIN/{1.73_M2}
GLUCOSE SERPL-MCNC: 120 MG/DL (ref 70–99)
GLUCOSE SERPL-MCNC: 147 MG/DL (ref 70–99)
GLUCOSE UR STRIP-MCNC: NEGATIVE MG/DL
HCT VFR BLD AUTO: 22.2 % (ref 35–47)
HCT VFR BLD AUTO: 26.5 % (ref 35–47)
HCT VFR BLD AUTO: NORMAL % (ref 35–47)
HGB BLD-MCNC: 6.8 G/DL (ref 11.7–15.7)
HGB BLD-MCNC: 7.8 G/DL (ref 11.7–15.7)
HGB BLD-MCNC: NORMAL G/DL (ref 11.7–15.7)
HGB UR QL STRIP: ABNORMAL
KETONES UR STRIP-MCNC: NEGATIVE MG/DL
LACTATE BLD-SCNC: 0.8 MMOL/L (ref 0.7–2)
LEUKOCYTE ESTERASE UR QL STRIP: ABNORMAL
MAGNESIUM SERPL-MCNC: 2.2 MG/DL (ref 1.6–2.3)
MCH RBC QN AUTO: 28.1 PG (ref 26.5–33)
MCH RBC QN AUTO: 28.2 PG (ref 26.5–33)
MCH RBC QN AUTO: NORMAL PG (ref 26.5–33)
MCHC RBC AUTO-ENTMCNC: 29.4 G/DL (ref 31.5–36.5)
MCHC RBC AUTO-ENTMCNC: 30.6 G/DL (ref 31.5–36.5)
MCHC RBC AUTO-ENTMCNC: NORMAL G/DL (ref 31.5–36.5)
MCV RBC AUTO: 92 FL (ref 78–100)
MCV RBC AUTO: 95 FL (ref 78–100)
MCV RBC AUTO: NORMAL FL (ref 78–100)
MUCOUS THREADS #/AREA URNS LPF: PRESENT /LPF
NITRATE UR QL: NEGATIVE
NUM BPU REQUESTED: 1
PH UR STRIP: 6 PH (ref 5–7)
PHOSPHATE SERPL-MCNC: 2.1 MG/DL (ref 2.5–4.5)
PLATELET # BLD AUTO: 158 10E9/L (ref 150–450)
PLATELET # BLD AUTO: 166 10E9/L (ref 150–450)
PLATELET # BLD AUTO: NORMAL 10E9/L (ref 150–450)
POTASSIUM SERPL-SCNC: 4 MMOL/L (ref 3.4–5.3)
POTASSIUM SERPL-SCNC: 4 MMOL/L (ref 3.4–5.3)
RBC # BLD AUTO: 2.41 10E12/L (ref 3.8–5.2)
RBC # BLD AUTO: 2.78 10E12/L (ref 3.8–5.2)
RBC # BLD AUTO: NORMAL 10E12/L (ref 3.8–5.2)
RBC #/AREA URNS AUTO: 2 /HPF (ref 0–2)
SODIUM SERPL-SCNC: 135 MMOL/L (ref 133–144)
SODIUM SERPL-SCNC: 136 MMOL/L (ref 133–144)
SOURCE: ABNORMAL
SP GR UR STRIP: 1.01 (ref 1–1.03)
SPECIMEN EXP DATE BLD: NORMAL
TRANSFUSION STATUS PATIENT QL: NORMAL
TRANSFUSION STATUS PATIENT QL: NORMAL
UROBILINOGEN UR STRIP-MCNC: NORMAL MG/DL (ref 0–2)
WBC # BLD AUTO: 8.5 10E9/L (ref 4–11)
WBC # BLD AUTO: 8.8 10E9/L (ref 4–11)
WBC # BLD AUTO: NORMAL 10E9/L (ref 4–11)
WBC #/AREA URNS AUTO: 82 /HPF (ref 0–5)

## 2019-07-13 PROCEDURE — 97116 GAIT TRAINING THERAPY: CPT | Mod: GP | Performed by: PHYSICAL THERAPIST

## 2019-07-13 PROCEDURE — 83605 ASSAY OF LACTIC ACID: CPT

## 2019-07-13 PROCEDURE — 87086 URINE CULTURE/COLONY COUNT: CPT | Performed by: UROLOGY

## 2019-07-13 PROCEDURE — 85027 COMPLETE CBC AUTOMATED: CPT | Performed by: UROLOGY

## 2019-07-13 PROCEDURE — 25800030 ZZH RX IP 258 OP 636: Performed by: PHYSICIAN ASSISTANT

## 2019-07-13 PROCEDURE — 97530 THERAPEUTIC ACTIVITIES: CPT | Mod: GP | Performed by: PHYSICAL THERAPIST

## 2019-07-13 PROCEDURE — 81001 URINALYSIS AUTO W/SCOPE: CPT | Performed by: STUDENT IN AN ORGANIZED HEALTH CARE EDUCATION/TRAINING PROGRAM

## 2019-07-13 PROCEDURE — 12000001 ZZH R&B MED SURG/OB UMMC

## 2019-07-13 PROCEDURE — P9016 RBC LEUKOCYTES REDUCED: HCPCS | Performed by: ANESTHESIOLOGY

## 2019-07-13 PROCEDURE — 36592 COLLECT BLOOD FROM PICC: CPT | Performed by: UROLOGY

## 2019-07-13 PROCEDURE — 25000128 H RX IP 250 OP 636: Performed by: STUDENT IN AN ORGANIZED HEALTH CARE EDUCATION/TRAINING PROGRAM

## 2019-07-13 PROCEDURE — 25800030 ZZH RX IP 258 OP 636: Performed by: STUDENT IN AN ORGANIZED HEALTH CARE EDUCATION/TRAINING PROGRAM

## 2019-07-13 PROCEDURE — 36592 COLLECT BLOOD FROM PICC: CPT | Performed by: STUDENT IN AN ORGANIZED HEALTH CARE EDUCATION/TRAINING PROGRAM

## 2019-07-13 PROCEDURE — 85027 COMPLETE CBC AUTOMATED: CPT | Performed by: STUDENT IN AN ORGANIZED HEALTH CARE EDUCATION/TRAINING PROGRAM

## 2019-07-13 PROCEDURE — 25000125 ZZHC RX 250: Performed by: PHYSICIAN ASSISTANT

## 2019-07-13 PROCEDURE — 87088 URINE BACTERIA CULTURE: CPT | Performed by: UROLOGY

## 2019-07-13 PROCEDURE — 25000132 ZZH RX MED GY IP 250 OP 250 PS 637: Performed by: PHYSICIAN ASSISTANT

## 2019-07-13 PROCEDURE — 83735 ASSAY OF MAGNESIUM: CPT | Performed by: STUDENT IN AN ORGANIZED HEALTH CARE EDUCATION/TRAINING PROGRAM

## 2019-07-13 PROCEDURE — 87186 SC STD MICRODIL/AGAR DIL: CPT | Performed by: UROLOGY

## 2019-07-13 PROCEDURE — 83605 ASSAY OF LACTIC ACID: CPT | Performed by: UROLOGY

## 2019-07-13 PROCEDURE — 25800025 ZZH RX 258: Performed by: UROLOGY

## 2019-07-13 PROCEDURE — 87102 FUNGUS ISOLATION CULTURE: CPT | Performed by: STUDENT IN AN ORGANIZED HEALTH CARE EDUCATION/TRAINING PROGRAM

## 2019-07-13 PROCEDURE — 25800025 ZZH RX 258: Performed by: PHYSICIAN ASSISTANT

## 2019-07-13 PROCEDURE — 80048 BASIC METABOLIC PNL TOTAL CA: CPT | Performed by: STUDENT IN AN ORGANIZED HEALTH CARE EDUCATION/TRAINING PROGRAM

## 2019-07-13 PROCEDURE — 25000132 ZZH RX MED GY IP 250 OP 250 PS 637: Performed by: STUDENT IN AN ORGANIZED HEALTH CARE EDUCATION/TRAINING PROGRAM

## 2019-07-13 PROCEDURE — 84100 ASSAY OF PHOSPHORUS: CPT | Performed by: STUDENT IN AN ORGANIZED HEALTH CARE EDUCATION/TRAINING PROGRAM

## 2019-07-13 PROCEDURE — 25000128 H RX IP 250 OP 636: Performed by: PHYSICIAN ASSISTANT

## 2019-07-13 RX ADMIN — POTASSIUM CHLORIDE 20 MEQ: 29.8 INJECTION, SOLUTION INTRAVENOUS at 08:29

## 2019-07-13 RX ADMIN — ACETAMINOPHEN 975 MG: 325 TABLET, FILM COATED ORAL at 08:30

## 2019-07-13 RX ADMIN — KETOROLAC TROMETHAMINE 1 DROP: 4 SOLUTION/ DROPS OPHTHALMIC at 08:30

## 2019-07-13 RX ADMIN — KETOROLAC TROMETHAMINE 1 DROP: 4 SOLUTION/ DROPS OPHTHALMIC at 13:23

## 2019-07-13 RX ADMIN — POTASSIUM CHLORIDE, DEXTROSE MONOHYDRATE AND SODIUM CHLORIDE: 150; 5; 900 INJECTION, SOLUTION INTRAVENOUS at 15:00

## 2019-07-13 RX ADMIN — POTASSIUM CHLORIDE, DEXTROSE MONOHYDRATE AND SODIUM CHLORIDE: 150; 5; 900 INJECTION, SOLUTION INTRAVENOUS at 03:46

## 2019-07-13 RX ADMIN — ACETAMINOPHEN 975 MG: 325 TABLET, FILM COATED ORAL at 22:32

## 2019-07-13 RX ADMIN — ACETAMINOPHEN 975 MG: 325 TABLET, FILM COATED ORAL at 13:23

## 2019-07-13 RX ADMIN — BUPIVACAINE HYDROCHLORIDE: 7.5 INJECTION, SOLUTION EPIDURAL; RETROBULBAR at 07:12

## 2019-07-13 RX ADMIN — PREDNISOLONE ACETATE 1 DROP: 10 SUSPENSION/ DROPS OPHTHALMIC at 20:00

## 2019-07-13 RX ADMIN — PREDNISOLONE ACETATE 1 DROP: 10 SUSPENSION/ DROPS OPHTHALMIC at 13:23

## 2019-07-13 RX ADMIN — KETOROLAC TROMETHAMINE 1 DROP: 4 SOLUTION/ DROPS OPHTHALMIC at 20:00

## 2019-07-13 RX ADMIN — POTASSIUM PHOSPHATE, MONOBASIC AND POTASSIUM PHOSPHATE, DIBASIC 15 MMOL: 224; 236 INJECTION, SOLUTION INTRAVENOUS at 13:23

## 2019-07-13 RX ADMIN — PREDNISOLONE ACETATE 1 DROP: 10 SUSPENSION/ DROPS OPHTHALMIC at 08:30

## 2019-07-13 RX ADMIN — ACETAMINOPHEN 650 MG: 325 TABLET, FILM COATED ORAL at 17:48

## 2019-07-13 RX ADMIN — ACETAMINOPHEN 650 MG: 325 TABLET, FILM COATED ORAL at 03:46

## 2019-07-13 ASSESSMENT — ACTIVITIES OF DAILY LIVING (ADL)
ADLS_ACUITY_SCORE: 13
ADLS_ACUITY_SCORE: 14
ADLS_ACUITY_SCORE: 13

## 2019-07-13 NOTE — PLAN OF CARE
Pt admitted for ileal conduit takedown. A&Ox4. VSS on 1L via NC, except febrile w/ T max of 102.0 F. C/o pain at incision/drain site, PCA dilaudid 0.3mg and bupivacaine epidural at 8ml/hr running. Prn tylenol given once overnight. No c/o nausea. Tolerates clears well w/ fair appetite. PICC infusing w/ blood return noted. Triggered sepsis protocol, MD aware. LA resulted at 1.6. Blood cx drawn, UA sent and CXR done. HGB resulted at 6.8, 1 U RBC's given, pt tolerated well. HGB recheck this AM was 7.8. BLE swelling, pt refused PCD's overnight. LEANDRO output of 30 ml serosanguinous drainage. 150 output from colostomy. 1350 UO from urostomy. All dressings CDI. Abd incision well approximated w/ staples, open to air. Up w/ assist of 1. Able to make needs known.  Recheck electrolytes this AM. Will continue with POC.

## 2019-07-13 NOTE — PROGRESS NOTES
REGIONAL ANESTHESIA PAIN SERVICE (RAPS) EVALUATION:  - Time: 2:00AM.  Called to evaluate patient for pain.  - Evaluation: Patient reports pain intensity with current therapy 4/10 at rest and 5/10 with activity  General: mild distress  Catheter system integrity: Intact.  Skin: dressing clean, dry and intact    Current vitals: /57, , RR 16    - Assessment/Plan    PAIN:  well controlled with current pain regimen.     INTERVENTION:   Bolus administered    MEDICATION: PF bupivacaine 2.5%, total bolus 6 mL    PROCEDURE: Clinician bolus via epidural catheter; administered without complication with negative aspirate before and between each mL.  No symptoms of local anesthetic systemic toxicity (LAST). Remained with and assessed patient for 10 min post-injection. BP, P and MAP stable    POST-PROCEDURE: Bedside RN aware of need to continue BP, P and MAP monitoring Q 10 min for an additional 30 min. Contact RAPS if any of the following: patient experiencing any untoward effects, SBP< 90, P < 50 or > 120, MAP < 60     - patient can be evaluated to receive local anesthetic bolus Q 12 hr PRN pain not controlled with continuous infusion.  Bedside nurse must page RAPS to request bolus    Klarissa Molina MD  CA2 Anesthesia Resident.         RAPS Contact Info (24 hour job code pager is the last 4 digits) For in-house use only:  [employer] phone: [place] [phone], [place] [employer]: [employer] [phone], [facility] [phone], then enter call-back number.    Text: Use [employer] on the Intranet <Paging/Directory> tab and enter Jobcode ID.   If no call back at any time, contact the hospital  and ask for RAPS attending or backup

## 2019-07-13 NOTE — PROGRESS NOTES
"Urology  Progress Note  Patient had Tmax of 102 at 2205,  Afebrile since that time - work up notable for ABLA   Patient ambulating  Pain moderately controlled, no additional epidural bolus required overnight   Tolerating CLD, no N/V, would like more food   Ileostomy with adequate gas/output     Exam  /59 (BP Location: Left leg)   Pulse 104   Temp 99.8  F (37.7  C) (Oral)   Resp 16   Ht 1.473 m (4' 10\")   Wt 69.4 kg (153 lb)   SpO2 97%   BMI 31.98 kg/m    No acute distress  Tachycardic, regular rhythm  Unlabored breathing on RA  Abdomen soft, non-distended, appropriately tender to palpation. L abdominal ileostomy w/ a scant brown liquid and gas. Laparotomy incision C/D/I with staples   R abdominal urostomy with dark UOP, mucous in bag, stoma pink and viable with stent. Yellow urine in bag.   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     Neuro: PCA, epidural for pain control  CV: HDS  Pulm: incentive spirometry while awake  FEN/GI: CLD, MIVF @ 100/hr, consider advancing to FLD  Endo: DIRK   : monitor urine output  Heme/ID: Hgb to 6.8 overnight, responded appropriately to 1 U PRBC, will recheck in the afternoon BCx x2 7/11 NGTD, BCx x 2 7/12 NGTD, UCx pending, currently afebrile  Activity: Up with assist. Encourage ambulation.   PPx: SCDs    Seen and examined with the chief resident. Will discuss with Dr. Causey.    Martínez Reilly MD  Urology PGY-4         Contacting the Urology Team     Please use the following job codes to reach the Urology Team. Note that you must use an in house phone and that job codes cannot receive text pages.     On weekdays, dial [phone] (or star-star-star [phone] on the new [employer] telephones) then [phone] to reach the Adult " Urology resident or PA on call    On weekdays, dial [phone] (or star-star-star 777 on the new [employer] telephones) then [phone] to reach the Pediatric Urology resident    On weeknights and weekends, dial [phone] (or star-star-star 777 on the new [employer] telephones) then [phone] to reach the Urology resident on call (for both Adult and Pediatrics)

## 2019-07-13 NOTE — PROGRESS NOTES
REGIONAL ANESTHESIA PAIN SERVICE CONTINUOUS NERVE INFUSION NOTE  SUBJECTIVE:  Interval History: Pt reports adequate pain control via continuous peripheral nerve block (CPNB) infusion.  Denies any weakness, paresthesias, circumoral numbness, metallic taste or tinnitus.  Pt is ambulating with assistance.  Patient is currently with no nausea or vomiting.        Clinically Aligned Pain Assessment (CAPA):   Comfort (How is your pain?): Comfortably manageable  Change in Pain (Since your last medication/intervention?): About the same  Pain Control (How are your pain treatments working?):  Fully effective pain control  Functioning (Are you able to do activities to get better?) : Can do most things, but pain gets in the way of some   Sleep (Does your pain management allow you to sleep or rest?): Normal sleep     Numerical Rating Scale:  3/10 at rest and 5/10 with movement.        Anticoagulation:  none      OBJECTIVE:    Diagnostic:  Lab Results   Component Value Date    WBC 8.8 07/13/2019     Lab Results   Component Value Date    RBC 2.78 07/13/2019     Lab Results   Component Value Date    HGB 7.8 07/13/2019     Lab Results   Component Value Date    HCT 26.5 07/13/2019     Lab Results   Component Value Date     07/13/2019         Vitals:    Temp:  [36.5  C (97.7  F)-38.9  C (102  F)] 37.7  C (99.8  F)  Pulse:  [104] 104  Heart Rate:  [] 101  Resp:  [16-18] 16  BP: (113-137)/(47-59) 137/59  SpO2:  [96 %-100 %] 97 %    Exam:    Strength 5/5 and symmetric grossly in bilateral LE   Level to cold sensation:equal   Epidural catheter site with dressing c/d/i, no tenderness, erythema, heme, edema    Current Facility-Administered Medications:      acetaminophen (TYLENOL) tablet 650 mg, 650 mg, Oral, Q4H PRN, Martínez Reilly MD, 650 mg at 07/13/19 0346     acetaminophen (TYLENOL) tablet 975 mg, 975 mg, Oral, Q8H, Mariposa Baez PA, 975 mg at 07/13/19 0830     benzocaine-menthol (CEPACOL) 15-3.6 MG lozenge 1-2  lozenge, 1-2 lozenge, Buccal, Q1H PRN, Martínez Reilly MD     bupivacaine (MARCAINE) 0.125 % in sodium chloride 0.9 % 250 mL EPIDURAL Infusion, , EPIDURAL, Continuous, Klarissa Santos MD, Last Rate: 8 mL/hr at 07/13/19 0712     dextrose 5% and 0.9% NaCl with potassium chloride 20 mEq infusion, , Intravenous, Continuous, Mariposa Baez PA, Last Rate: 100 mL/hr at 07/13/19 0346     HYDROmorphone (DILAUDID) PCA 1 mg/mL OPIOID NAIVE, , Intravenous, Continuous, Mariposa Baez PA     ketorolac tromethamine (ACULAR-LS) 0.4 % ophthalmic solution 1 drop, 1 drop, Both Eyes, TID, Mariposa Baez PA, 1 drop at 07/13/19 0830     lidocaine (LMX4) cream, , Topical, Q1H PRN, Martínez Reilly MD     lidocaine 1 % 0.1-1 mL, 0.1-1 mL, Other, Q1H PRN, Martínez Reilly MD     magnesium sulfate 2 g in NS intermittent infusion ([employer]ar[employer]ium or [employer] Cmpd), 2 g, Intravenous, Daily PRN, Mariposa Baez PA     magnesium sulfate 4 g in 100 mL sterile water (premade), 4 g, Intravenous, Q4H PRN, Mariposa Baez PA, 4 g at 07/12/19 1400     naloxone (NARCAN) injection 0.1-0.4 mg, 0.1-0.4 mg, Intravenous, Q2 Min PRN, Martínez Reilly MD     ondansetron (ZOFRAN-ODT) ODT tab 4 mg, 4 mg, Oral, Q6H PRN **OR** ondansetron (ZOFRAN) injection 4 mg, 4 mg, Intravenous, Q6H PRN, Martínez Reilly MD     potassium chloride 10 mEq in 100 mL intermittent infusion with 10 mg lidocaine, 10 mEq, Intravenous, Q1H PRN, Mariposa Baez PA     potassium chloride 20 mEq in 50 mL intermittent infusion, 20 mEq, Intravenous, Q1H PRN, Mariposa Baez PA, Last Rate: 50 mL/hr at 07/13/19 0829, 20 mEq at 07/13/19 0829     potassium phosphate 15 mmol in D5W 250 mL intermittent infusion, 15 mmol, Intravenous, Daily PRN, Mariposa Baez PA, 15 mmol at 07/12/19 1627     potassium phosphate 20 mmol in D5W 250 mL intermittent infusion, 20 mmol, Intravenous, Q6H PRN, Mariposa Baez PA     potassium phosphate 20  mmol in D5W 500 mL intermittent infusion, 20 mmol, Intravenous, Q6H PRN, Mariposa Baez PA     potassium phosphate 25 mmol in D5W 500 mL intermittent infusion, 25 mmol, Intravenous, Q8H PRN, Mariposa Baez PA     prednisoLONE acetate (PRED FORTE) 1 % ophthalmic susp 1 drop, 1 drop, Both Eyes, TID, Martínez Reilly MD, 1 drop at 07/13/19 0830     prochlorperazine (COMPAZINE) injection 10 mg, 10 mg, Intravenous, Q6H PRN **OR** prochlorperazine (COMPAZINE) tablet 10 mg, 10 mg, Oral, Q6H PRN, Martínez Reilly MD     senna-docusate (SENOKOT-S/PERICOLACE) 8.6-50 MG per tablet 1 tablet, 1 tablet, Oral, BID **OR** senna-docusate (SENOKOT-S/PERICOLACE) 8.6-50 MG per tablet 2 tablet, 2 tablet, Oral, BID, Martínez Reilly MD     sodium chloride (PF) 0.9% PF flush 3 mL, 3 mL, Intracatheter, q1 min prn, Martínez Reilly MD, 20 mL at 07/13/19 0643     sodium chloride (PF) 0.9% PF flush 3 mL, 3 mL, Intracatheter, Q8H, Martínez Reilly MD, 3 mL at 07/13/19 0832    ASSESSMENT/PLAN:    Karo Lindsay is a 49 year old female POD #3 s/p REVISION, URINARY CONDUIT and placement of epidural catheter for analgesia.  Pt is receiving adequate analgesia with bupivacaine 0.125%, total infusion 8 mL/hour.  Pt is   ambulating without difficulty.  No weakness or paresthesias,   adequate sensory block.  No  evidence of adverse side effects associated with local anesthetic.She received a bolus dose of 0.25% bupivacaine 6 ml at 1.45 pm     - continue current total infusion of 8mL/hour  - will continue to follow and adjust as needed  - discussed plan with attending anesthesiologist    Aren Mari MD  Regional Anesthesia Pain Service  7/13/2019 11:48 AM    24 hour Job Code Pager.  For in-house use only.     [place]:  * * [phone]  [facility]: * * [phone]  [facility]: * * [phone]

## 2019-07-13 NOTE — PLAN OF CARE
5A:  Discharge Planner PT   Patient plan for discharge: home with assist  Current status: Pt mod A x2 for supine > sit transfer and min A x1 for STS transfer. Demonstrated stand pivot transfer from bed to wheelchair with min A x1. Pt ambulated 175ft with FWW min A x1 with intermittent standing rest breaks throughout. Overall, A is required due to pain.  Barriers to return to prior living situation: pain management, medical status, deconditioning   Recommendations for discharge: home with assist   Rationale for recommendations: Pt is progressing quickly s/p surgery and has a good support system at home as  is able to take time off work to assist. Pt was independent at baseline and is continuing to regain functional independence each day.        Entered by: Leyla Payton 07/13/2019 11:57 AM

## 2019-07-13 NOTE — PLAN OF CARE
0700 - 1900:     Reason for admission: s/p ileal conduit takedown, L stent exchange  Vitals: VSS ex tachy and tmax 99.9 axillary on RA - 1 L O2 - prn tylenol given x1 for temp  Activity: A1/2 + W and gait belt with WC follow for safety  Pain: c/o manageable abd pain with Bupivacaine epidural @ 8 mL/hr and Dilaudid PCA, scheduled po tylenol  Neuro: WNL ex soft speech  Cardiac: tachycardic  Respiratory: WNL ex ROMERO, on 1 L O2 intermittently   GI/: ileostomy WNL, urostomy w/ 1 stent to gravity drainage, L abd LEANDRO drain to bulb suction, large midline incision with staple closure open to air, epidural drsg CDI  Diet: Full liquid, well tolerated  Lines: R dbl PICC running D5 NS w/ 20 mEq K @ 50 mL/hr and dilaudid PCA, colostomy, urostomy, epidural, L LEANDRO drsg changed  Wounds: midline incision WNL KYLE  Labs/imaging: K 4.0, Mg 2.2, Phos 2.1, Hgb 7.8     New changes this shift: K and Phos replaced - recheck in AM, IVF rate decreased, diet advanced, walking in halls with PT, received 6 mL bolus of Bupivacaine via epidural @ 1345 d/t pain, pt states after naps/sleeping she gets behind on pain meds and wakes up in pain - pt states she will discuss plan with night RN re: pain strategy tonight     Plan: manage pain, monitor drains, discharge TCU when stable

## 2019-07-14 ENCOUNTER — APPOINTMENT (OUTPATIENT)
Dept: GENERAL RADIOLOGY | Facility: CLINIC | Age: 49
End: 2019-07-14
Attending: UROLOGY
Payer: COMMERCIAL

## 2019-07-14 ENCOUNTER — APPOINTMENT (OUTPATIENT)
Dept: OCCUPATIONAL THERAPY | Facility: CLINIC | Age: 49
End: 2019-07-14
Attending: UROLOGY
Payer: COMMERCIAL

## 2019-07-14 ENCOUNTER — APPOINTMENT (OUTPATIENT)
Dept: ULTRASOUND IMAGING | Facility: CLINIC | Age: 49
End: 2019-07-14
Attending: UROLOGY
Payer: COMMERCIAL

## 2019-07-14 LAB
ANION GAP SERPL CALCULATED.3IONS-SCNC: 6 MMOL/L (ref 3–14)
BUN SERPL-MCNC: 4 MG/DL (ref 7–30)
CALCIUM SERPL-MCNC: 7.4 MG/DL (ref 8.5–10.1)
CHLORIDE SERPL-SCNC: 106 MMOL/L (ref 94–109)
CO2 SERPL-SCNC: 25 MMOL/L (ref 20–32)
CREAT SERPL-MCNC: 0.68 MG/DL (ref 0.52–1.04)
ERYTHROCYTE [DISTWIDTH] IN BLOOD BY AUTOMATED COUNT: 13.4 % (ref 10–15)
GFR SERPL CREATININE-BSD FRML MDRD: >90 ML/MIN/{1.73_M2}
GLUCOSE SERPL-MCNC: 121 MG/DL (ref 70–99)
HCT VFR BLD AUTO: 25.4 % (ref 35–47)
HGB BLD-MCNC: 7.9 G/DL (ref 11.7–15.7)
LACTATE BLD-SCNC: NORMAL MMOL/L (ref 0.7–2)
MAGNESIUM SERPL-MCNC: 1.6 MG/DL (ref 1.6–2.3)
MCH RBC QN AUTO: 28.5 PG (ref 26.5–33)
MCHC RBC AUTO-ENTMCNC: 31.1 G/DL (ref 31.5–36.5)
MCV RBC AUTO: 92 FL (ref 78–100)
PHOSPHATE SERPL-MCNC: 2.3 MG/DL (ref 2.5–4.5)
PLATELET # BLD AUTO: 182 10E9/L (ref 150–450)
POTASSIUM SERPL-SCNC: 3.7 MMOL/L (ref 3.4–5.3)
RBC # BLD AUTO: 2.77 10E12/L (ref 3.8–5.2)
SODIUM SERPL-SCNC: 137 MMOL/L (ref 133–144)
WBC # BLD AUTO: 8.5 10E9/L (ref 4–11)

## 2019-07-14 PROCEDURE — 25000132 ZZH RX MED GY IP 250 OP 250 PS 637: Performed by: PHYSICIAN ASSISTANT

## 2019-07-14 PROCEDURE — 71045 X-RAY EXAM CHEST 1 VIEW: CPT

## 2019-07-14 PROCEDURE — 83735 ASSAY OF MAGNESIUM: CPT | Performed by: STUDENT IN AN ORGANIZED HEALTH CARE EDUCATION/TRAINING PROGRAM

## 2019-07-14 PROCEDURE — 97530 THERAPEUTIC ACTIVITIES: CPT | Mod: GO

## 2019-07-14 PROCEDURE — 84100 ASSAY OF PHOSPHORUS: CPT | Performed by: STUDENT IN AN ORGANIZED HEALTH CARE EDUCATION/TRAINING PROGRAM

## 2019-07-14 PROCEDURE — 93970 EXTREMITY STUDY: CPT

## 2019-07-14 PROCEDURE — 25000128 H RX IP 250 OP 636: Performed by: STUDENT IN AN ORGANIZED HEALTH CARE EDUCATION/TRAINING PROGRAM

## 2019-07-14 PROCEDURE — 25000128 H RX IP 250 OP 636: Performed by: PHYSICIAN ASSISTANT

## 2019-07-14 PROCEDURE — 25000132 ZZH RX MED GY IP 250 OP 250 PS 637: Performed by: STUDENT IN AN ORGANIZED HEALTH CARE EDUCATION/TRAINING PROGRAM

## 2019-07-14 PROCEDURE — 87040 BLOOD CULTURE FOR BACTERIA: CPT | Performed by: STUDENT IN AN ORGANIZED HEALTH CARE EDUCATION/TRAINING PROGRAM

## 2019-07-14 PROCEDURE — 97535 SELF CARE MNGMENT TRAINING: CPT | Mod: GO

## 2019-07-14 PROCEDURE — 25800030 ZZH RX IP 258 OP 636: Performed by: STUDENT IN AN ORGANIZED HEALTH CARE EDUCATION/TRAINING PROGRAM

## 2019-07-14 PROCEDURE — 12000001 ZZH R&B MED SURG/OB UMMC

## 2019-07-14 PROCEDURE — 36592 COLLECT BLOOD FROM PICC: CPT | Performed by: STUDENT IN AN ORGANIZED HEALTH CARE EDUCATION/TRAINING PROGRAM

## 2019-07-14 PROCEDURE — 87102 FUNGUS ISOLATION CULTURE: CPT | Performed by: STUDENT IN AN ORGANIZED HEALTH CARE EDUCATION/TRAINING PROGRAM

## 2019-07-14 PROCEDURE — 85027 COMPLETE CBC AUTOMATED: CPT | Performed by: STUDENT IN AN ORGANIZED HEALTH CARE EDUCATION/TRAINING PROGRAM

## 2019-07-14 PROCEDURE — 25800030 ZZH RX IP 258 OP 636: Performed by: PHYSICIAN ASSISTANT

## 2019-07-14 PROCEDURE — 80048 BASIC METABOLIC PNL TOTAL CA: CPT | Performed by: STUDENT IN AN ORGANIZED HEALTH CARE EDUCATION/TRAINING PROGRAM

## 2019-07-14 PROCEDURE — 25000125 ZZHC RX 250: Performed by: PHYSICIAN ASSISTANT

## 2019-07-14 RX ORDER — HYDROMORPHONE HYDROCHLORIDE 2 MG/1
2-4 TABLET ORAL EVERY 4 HOURS PRN
Status: DISCONTINUED | OUTPATIENT
Start: 2019-07-14 | End: 2019-07-21 | Stop reason: HOSPADM

## 2019-07-14 RX ORDER — HYDROMORPHONE HYDROCHLORIDE 1 MG/ML
.2-.4 INJECTION, SOLUTION INTRAMUSCULAR; INTRAVENOUS; SUBCUTANEOUS
Status: DISCONTINUED | OUTPATIENT
Start: 2019-07-14 | End: 2019-07-20

## 2019-07-14 RX ORDER — LEVOFLOXACIN 5 MG/ML
500 INJECTION, SOLUTION INTRAVENOUS EVERY 24 HOURS
Status: DISCONTINUED | OUTPATIENT
Start: 2019-07-14 | End: 2019-07-19

## 2019-07-14 RX ADMIN — ACETAMINOPHEN 975 MG: 325 TABLET, FILM COATED ORAL at 21:58

## 2019-07-14 RX ADMIN — PREDNISOLONE ACETATE 1 DROP: 10 SUSPENSION/ DROPS OPHTHALMIC at 14:23

## 2019-07-14 RX ADMIN — POTASSIUM PHOSPHATE, MONOBASIC AND POTASSIUM PHOSPHATE, DIBASIC 15 MMOL: 224; 236 INJECTION, SOLUTION INTRAVENOUS at 18:03

## 2019-07-14 RX ADMIN — HYDROMORPHONE HYDROCHLORIDE 0.4 MG: 1 INJECTION, SOLUTION INTRAMUSCULAR; INTRAVENOUS; SUBCUTANEOUS at 15:59

## 2019-07-14 RX ADMIN — HYDROMORPHONE HYDROCHLORIDE 4 MG: 2 TABLET ORAL at 10:54

## 2019-07-14 RX ADMIN — BUPIVACAINE HYDROCHLORIDE: 7.5 INJECTION, SOLUTION EPIDURAL; RETROBULBAR at 06:35

## 2019-07-14 RX ADMIN — HYDROMORPHONE HYDROCHLORIDE 0.4 MG: 1 INJECTION, SOLUTION INTRAMUSCULAR; INTRAVENOUS; SUBCUTANEOUS at 20:27

## 2019-07-14 RX ADMIN — ACETAMINOPHEN 975 MG: 325 TABLET, FILM COATED ORAL at 08:27

## 2019-07-14 RX ADMIN — HYDROMORPHONE HYDROCHLORIDE 0.2 MG: 1 INJECTION, SOLUTION INTRAMUSCULAR; INTRAVENOUS; SUBCUTANEOUS at 18:24

## 2019-07-14 RX ADMIN — HYDROMORPHONE HYDROCHLORIDE 0.4 MG: 1 INJECTION, SOLUTION INTRAMUSCULAR; INTRAVENOUS; SUBCUTANEOUS at 13:01

## 2019-07-14 RX ADMIN — PREDNISOLONE ACETATE 1 DROP: 10 SUSPENSION/ DROPS OPHTHALMIC at 08:26

## 2019-07-14 RX ADMIN — LEVOFLOXACIN 500 MG: 5 INJECTION, SOLUTION INTRAVENOUS at 18:25

## 2019-07-14 RX ADMIN — KETOROLAC TROMETHAMINE 1 DROP: 4 SOLUTION/ DROPS OPHTHALMIC at 14:23

## 2019-07-14 RX ADMIN — POTASSIUM CHLORIDE 20 MEQ: 29.8 INJECTION, SOLUTION INTRAVENOUS at 19:28

## 2019-07-14 RX ADMIN — KETOROLAC TROMETHAMINE 1 DROP: 4 SOLUTION/ DROPS OPHTHALMIC at 08:27

## 2019-07-14 RX ADMIN — Medication 2 G: at 18:03

## 2019-07-14 RX ADMIN — PREDNISOLONE ACETATE 1 DROP: 10 SUSPENSION/ DROPS OPHTHALMIC at 20:39

## 2019-07-14 RX ADMIN — HYDROMORPHONE HYDROCHLORIDE 4 MG: 2 TABLET ORAL at 22:05

## 2019-07-14 RX ADMIN — ACETAMINOPHEN 975 MG: 325 TABLET, FILM COATED ORAL at 14:23

## 2019-07-14 RX ADMIN — HYDROMORPHONE HYDROCHLORIDE 4 MG: 2 TABLET ORAL at 16:47

## 2019-07-14 RX ADMIN — KETOROLAC TROMETHAMINE 1 DROP: 4 SOLUTION/ DROPS OPHTHALMIC at 20:40

## 2019-07-14 ASSESSMENT — ACTIVITIES OF DAILY LIVING (ADL)
ADLS_ACUITY_SCORE: 13

## 2019-07-14 NOTE — PROGRESS NOTES
REGIONAL ANESTHESIA PAIN SERVICE CONTINUOUS NERVE INFUSION NOTE  SUBJECTIVE:  Interval History: Pt reports adequate pain control via continuous peripheral nerve block (CPNB) infusion.  Denies any weakness, paresthesias, circumoral numbness, metallic taste or tinnitus.  Pt is ambulating with assistance.  Patient is currently with no nausea or vomiting. Overall feeling better from previous days     Clinically Aligned Pain Assessment (CAPA):   Comfort (How is your pain?): Comfortably manageable  Change in Pain (Since your last medication/intervention?): About the same  Pain Control (How are your pain treatments working?):  Fully effective pain control  Functioning (Are you able to do activities to get better?) : Can do most things, but pain gets in the way of some   Sleep (Does your pain management allow you to sleep or rest?): Normal sleep     Numerical Rating Scale:  2/10 at rest and 5/10 with movement.        Anticoagulation:  none      OBJECTIVE:    Diagnostic:  Lab Results   Component Value Date    WBC 8.8 07/13/2019     Lab Results   Component Value Date    RBC 2.78 07/13/2019     Lab Results   Component Value Date    HGB 7.8 07/13/2019     Lab Results   Component Value Date    HCT 26.5 07/13/2019     Lab Results   Component Value Date     07/13/2019         Vitals:    Temp:  [37.2  C (98.9  F)-38.4  C (101.1  F)] 37.3  C (99.2  F)  Heart Rate:  [100-114] 103  Resp:  [15-16] 16  BP: (118-167)/(53-73) 167/61  SpO2:  [93 %-96 %] 94 %    Exam:    Strength 5/5 and symmetric grossly in bilateral LE   Level to cold sensation:equal   Epidural catheter site with dressing c/d/i, no tenderness, erythema, heme, edema    Current Facility-Administered Medications:      acetaminophen (TYLENOL) tablet 650 mg, 650 mg, Oral, Q4H PRN, Martínez Reilly MD, 650 mg at 07/13/19 1748     acetaminophen (TYLENOL) tablet 975 mg, 975 mg, Oral, Q8H, Mariposa Baez PA, 975 mg at 07/14/19 0827     benzocaine-menthol (CEPACOL)  15-3.6 MG lozenge 1-2 lozenge, 1-2 lozenge, Buccal, Q1H PRN, Martínez Reilly MD     bupivacaine (MARCAINE) 0.125 % in sodium chloride 0.9 % 250 mL EPIDURAL Infusion, , EPIDURAL, Continuous, Klarissa Santos MD, Last Rate: 8 mL/hr at 07/14/19 0635     HYDROmorphone (DILAUDID) tablet 2-4 mg, 2-4 mg, Oral, Q4H PRN, Brandi Gautam MD, 4 mg at 07/14/19 1054     HYDROmorphone (PF) (DILAUDID) injection 0.2-0.4 mg, 0.2-0.4 mg, Intravenous, Q2H PRN, Brandi Gautam MD     ketorolac tromethamine (ACULAR-LS) 0.4 % ophthalmic solution 1 drop, 1 drop, Both Eyes, TID, Mariposa Baez PA, 1 drop at 07/14/19 0827     lidocaine (LMX4) cream, , Topical, Q1H PRN, Martínez Reilly MD     lidocaine 1 % 0.1-1 mL, 0.1-1 mL, Other, Q1H PRN, Martínez Reilly MD     magnesium sulfate 2 g in NS intermittent infusion ([employer]ar[employer] or [employer]), 2 g, Intravenous, Daily PRN, aMriposa Baez PA     magnesium sulfate 4 g in 100 mL sterile water (premade), 4 g, Intravenous, Q4H PRN, Mariposa Baez PA, 4 g at 07/12/19 1400     naloxone (NARCAN) injection 0.1-0.4 mg, 0.1-0.4 mg, Intravenous, Q2 Min PRN, Martínez Reilly MD     ondansetron (ZOFRAN-ODT) ODT tab 4 mg, 4 mg, Oral, Q6H PRN **OR** ondansetron (ZOFRAN) injection 4 mg, 4 mg, Intravenous, Q6H PRN, Martínez Reilly MD     potassium chloride 10 mEq in 100 mL intermittent infusion with 10 mg lidocaine, 10 mEq, Intravenous, Q1H PRN, Mairposa Baez PA     potassium chloride 20 mEq in 50 mL intermittent infusion, 20 mEq, Intravenous, Q1H PRN, Mariposa Baez PA, Last Rate: 50 mL/hr at 07/13/19 0829, 20 mEq at 07/13/19 0829     potassium phosphate 15 mmol in D5W 250 mL intermittent infusion, 15 mmol, Intravenous, Daily PRN, Mariposa Baez PA, 15 mmol at 07/13/19 1323     potassium phosphate 20 mmol in D5W 250 mL intermittent infusion, 20 mmol, Intravenous, Q6H PRN, Mariposa Baez PA     potassium phosphate 20 mmol in D5W 500  mL intermittent infusion, 20 mmol, Intravenous, Q6H PRN, Mariposa Baez PA     potassium phosphate 25 mmol in D5W 500 mL intermittent infusion, 25 mmol, Intravenous, Q8H PRN, Mariposa Baez PA     prednisoLONE acetate (PRED FORTE) 1 % ophthalmic susp 1 drop, 1 drop, Both Eyes, TID, Martínez Reilly MD, 1 drop at 07/14/19 0826     prochlorperazine (COMPAZINE) injection 10 mg, 10 mg, Intravenous, Q6H PRN **OR** prochlorperazine (COMPAZINE) tablet 10 mg, 10 mg, Oral, Q6H PRN, Martínez Reilly MD     senna-docusate (SENOKOT-S/PERICOLACE) 8.6-50 MG per tablet 1 tablet, 1 tablet, Oral, BID **OR** senna-docusate (SENOKOT-S/PERICOLACE) 8.6-50 MG per tablet 2 tablet, 2 tablet, Oral, BID, Martínez Reilly MD     sodium chloride (PF) 0.9% PF flush 3 mL, 3 mL, Intracatheter, q1 min prn, Martínez Reilly MD, 20 mL at 07/13/19 0643     sodium chloride (PF) 0.9% PF flush 3 mL, 3 mL, Intracatheter, Q8H, Martínez Reilly MD, 30 mL at 07/14/19 0748    ASSESSMENT/PLAN:    Karo Lindsay is a 49 year old female POD #4 s/p REVISION, URINARY CONDUIT and placement of epidural catheter for analgesia.  Pt is receiving adequate analgesia with bupivacaine 0.125%, total infusion 8 mL/hour.  Pt is ambulating without difficulty.  No weakness or paresthesias, adequate sensory block.  No  evidence of adverse side effects associated with local anesthetic.    - continue current total infusion of 8mL/hour  - will continue to follow and adjust as needed  - plan for likely removal of catheter tomorrow, 7/15/2019  - discussed plan with attending anesthesiologist    Dima Cox MD  Regional Anesthesia Pain Service    24 hour Job Code Pager.  For in-house use only.     [place]:  * * [phone]  [place]: * * [phone]  P[facility]: * * [phone]

## 2019-07-14 NOTE — PLAN OF CARE
0700 - 1900:     Reason for admission: s/p ileal conduit takedown, L stent exchange  Vitals: VSS ex tachy and febrile on RA - 1 L O2 NC  Activity: A1/2 + W and gait belt with WC follow for safety  Pain: c/o manageable abd pain with Bupivacaine epidural @ 8 mL/hr, PRN po dilaudid 2-4 mg q4h, PRN IV dilaudid 0.2-0.4 mg q2h  Neuro: WNL ex soft speech  Cardiac: tachycardic  Respiratory: WNL ex ROMERO, on 1 L O2 intermittently   GI/: ileostomy bag replaced - WNL, urostomy w/ 1 stent to gravity drainage, L abd LEANDRO drain to bulb suction, large midline incision with staple closure open to air, epidural drsg CDI  Diet: Regular diet, well tolerated  Lines: R dbl PICC, colostomy, urostomy, epidural, L LEANDRO drsg changed  Wounds: midline incision WNL KYLE  Labs/imaging: LE US, Hgb 7.9, K 3.7, Mg 1.6, Phos 2.3     New changes this shift: POD #4 of urinary conduit takedown and stent exchange, pt wants WOC RN to see tomorrow morning to help with ileostomy and urostomy bag exchange, IVF discontinued and PCA discontinued, LE US done to assess for DVT - negative for DVT, febrile most of shift - added IV levofloxacin every day for urine cultures, working on replacing electrolytes     Plan: manage pain - likely to remove epidural tomorrow, monitor drains, discharge TCU when stable    Continue to monitor and follow POC

## 2019-07-14 NOTE — ADDENDUM NOTE
Addendum  created 07/14/19 1514 by Travis Barnes MD    Cosign clinical note with attestation, Sign clinical note

## 2019-07-14 NOTE — PROGRESS NOTES
"I saw and evaluated the patient and agree with the resident note and plan of care. Will also obtain lower extremity duplex ultrasound today to further work up symptoms of tachycardia and low grade temps.     Adriana Najera MD  Reconstructive Urology Fellow    --------------------------------------------------------------------------------------------------------------------  Urology  Progress Note  Patient had Tmax of 101.1 at 1945 with spontaneous resolution, no antibiotics started at this time   Hgb stable   Patient ambulating  Pain moderately controlled, received epidural bolus overnight   Tolerating FLD, no N/V, would like more food   Ileostomy with adequate gas/output     Exam  /61 (BP Location: Left leg)   Pulse 104   Temp 99.2  F (37.3  C) (Oral)   Resp 16   Ht 1.473 m (4' 10\")   Wt 69.4 kg (153 lb)   SpO2 95%   BMI 31.98 kg/m    No acute distress  Tachycardic, regular rhythm  Unlabored breathing on 1L NC  Abdomen soft, non-distended, appropriately tender to palpation. L abdominal ileostomy w/  brown liquid and gas. Laparotomy incision C/D/I with staples, no clear evidence of fluctuance/drainage   R abdominal urostomy with dark UOP, mucous in bag, stoma pink and viable with single stent. Yellow urine in bag.   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     Neuro: PCA, epidural for pain control  - Stop PCA, switch to PRN IV and oral dilaudid    CV: HDS  Pulm: incentive spirometry while awake    FEN/GI:   - FLD; will consider advancing to regular diet   - Stop MIVF    Endo: DIRK   : monitor urine output    Heme/ID:   - Hgb stable since being transfused with 1 U PRBC  - BCx x 2 7/12 NGTD, UCx 10-50K GNRs, speciation/sensitivities pending, continues to " demonstrated cyclical fevers with spontaneous resolution and stable/normal WBC, no indication for antibiotics at this time but will CTM     Activity: Up with assist. Encourage ambulation.   PPx: SCDs    Seen and examined with Neftali Ramos and Krystyna. Will discuss with Dr. Causey.    Brandi Gautam MD  Urology PGY-4         Contacting the Urology Team     Please use the following job codes to reach the Urology Team. Note that you must use an in house phone and that job codes cannot receive text pages.     On weekdays, dial [phone] (or star-star-star [phone] on the new [employer] telephones) then [phone] to reach the Adult Urology resident or PA on call    On weekdays, dial [phone] (or star-star-star [phone] on the new [employer] telephones) then [phone] to reach the Pediatric Urology resident    On weeknights and weekends, dial [phone] (or star-star-star [phone] on the new [employer] telephones) then [phone] to reach the Urology resident on call (for both Adult and Pediatrics)

## 2019-07-14 NOTE — PLAN OF CARE
PT 5A: Cancel, pt experiencing spiking fevers with chills therefore denies therapy today. PT will reschedule.

## 2019-07-14 NOTE — PROGRESS NOTES
REGIONAL ANESTHESIA PAIN SERVICE CONTINUOUS NERVE INFUSION NOTE  SUBJECTIVE:  Interval History: Pt reports adequate pain control via continuous peripheral nerve block (CPNB) infusion.  Denies any weakness, paresthesias, circumoral numbness, metallic taste or tinnitus.  Pt is ambulating with assistance.  Patient is currently with no nausea or vomiting. Overall feeling better from previous days                 Clinically Aligned Pain Assessment (CAPA):   Comfort (How is your pain?): Comfortably manageable  Change in Pain (Since your last medication/intervention?): About the same  Pain Control (How are your pain treatments working?):  Fully effective pain control  Functioning (Are you able to do activities to get better?) : Can do most things, but pain gets in the way of some   Sleep (Does your pain management allow you to sleep or rest?): Normal sleep                 Numerical Rating Scale:  2/10 at rest and 5/10 with movement.          Anticoagulation:  none        OBJECTIVE:     Diagnostic:        Lab Results   Component Value Date     WBC 8.8 07/13/2019            Lab Results   Component Value Date     RBC 2.78 07/13/2019            Lab Results   Component Value Date     HGB 7.8 07/13/2019            Lab Results   Component Value Date     HCT 26.5 07/13/2019            Lab Results   Component Value Date      07/13/2019            Vitals:    Temp:  [37.2  C (98.9  F)-38.4  C (101.1  F)] 37.3  C (99.2  F)  Heart Rate:  [100-114] 103  Resp:  [15-16] 16  BP: (118-167)/(53-73) 167/61  SpO2:  [93 %-96 %] 94 %     Exam:               Strength 5/5 and symmetric grossly in bilateral LE              Level to cold sensation:equal              Epidural catheter site with dressing c/d/i, no tenderness, erythema, heme, edema     Current Facility-Administered Medications:      acetaminophen (TYLENOL) tablet 650 mg, 650 mg, Oral, Q4H PRN, Martínez Reilly MD, 650 mg at 07/13/19 1748     acetaminophen (TYLENOL) tablet  975 mg, 975 mg, Oral, Q8H, Mariposa Baez PA, 975 mg at 07/14/19 0827     benzocaine-menthol (CEPACOL) 15-3.6 MG lozenge 1-2 lozenge, 1-2 lozenge, Buccal, Q1H PRN, Martínez Reilly MD     bupivacaine (MARCAINE) 0.125 % in sodium chloride 0.9 % 250 mL EPIDURAL Infusion, , EPIDURAL, Continuous, Klarissa Santos MD, Last Rate: 8 mL/hr at 07/14/19 0635     HYDROmorphone (DILAUDID) tablet 2-4 mg, 2-4 mg, Oral, Q4H PRN, Brandi Gautam MD, 4 mg at 07/14/19 1054     HYDROmorphone (PF) (DILAUDID) injection 0.2-0.4 mg, 0.2-0.4 mg, Intravenous, Q2H PRN, Brandi Gautam MD     ketorolac tromethamine (ACULAR-LS) 0.4 % ophthalmic solution 1 drop, 1 drop, Both Eyes, TID, Mariposa Baez PA, 1 drop at 07/14/19 0827     lidocaine (LMX4) cream, , Topical, Q1H PRN, Martínez Reilly MD     lidocaine 1 % 0.1-1 mL, 0.1-1 mL, Other, Q1H PRN, Martínez Reilly MD     magnesium sulfate 2 g in NS intermittent infusion ([employer]ar[employer] or [employer]), 2 g, Intravenous, Daily PRN, Mariposa Baez PA     magnesium sulfate 4 g in 100 mL sterile water (premade), 4 g, Intravenous, Q4H PRN, Mariposa Baez PA, 4 g at 07/12/19 1400     naloxone (NARCAN) injection 0.1-0.4 mg, 0.1-0.4 mg, Intravenous, Q2 Min PRN, Martínez Reilly MD     ondansetron (ZOFRAN-ODT) ODT tab 4 mg, 4 mg, Oral, Q6H PRN **OR** ondansetron (ZOFRAN) injection 4 mg, 4 mg, Intravenous, Q6H PRN, Martínez Reilly MD     potassium chloride 10 mEq in 100 mL intermittent infusion with 10 mg lidocaine, 10 mEq, Intravenous, Q1H PRN, Mariposa Baez PA     potassium chloride 20 mEq in 50 mL intermittent infusion, 20 mEq, Intravenous, Q1H PRN, Mariposa Baez PA, Last Rate: 50 mL/hr at 07/13/19 0829, 20 mEq at 07/13/19 0829     potassium phosphate 15 mmol in D5W 250 mL intermittent infusion, 15 mmol, Intravenous, Daily PRN, Mariposa Baez PA, 15 mmol at 07/13/19 1323     potassium phosphate 20 mmol in D5W 250 mL intermittent  infusion, 20 mmol, Intravenous, Q6H PRN, Mariposa Baez PA     potassium phosphate 20 mmol in D5W 500 mL intermittent infusion, 20 mmol, Intravenous, Q6H PRN, Mariposa Baez PA     potassium phosphate 25 mmol in D5W 500 mL intermittent infusion, 25 mmol, Intravenous, Q8H PRN, Mariposa Baez PA     prednisoLONE acetate (PRED FORTE) 1 % ophthalmic susp 1 drop, 1 drop, Both Eyes, TID, Martínez Reilly MD, 1 drop at 07/14/19 0826     prochlorperazine (COMPAZINE) injection 10 mg, 10 mg, Intravenous, Q6H PRN **OR** prochlorperazine (COMPAZINE) tablet 10 mg, 10 mg, Oral, Q6H PRN, Martínez Reilly MD     senna-docusate (SENOKOT-S/PERICOLACE) 8.6-50 MG per tablet 1 tablet, 1 tablet, Oral, BID **OR** senna-docusate (SENOKOT-S/PERICOLACE) 8.6-50 MG per tablet 2 tablet, 2 tablet, Oral, BID, Martínez Reilly MD     sodium chloride (PF) 0.9% PF flush 3 mL, 3 mL, Intracatheter, q1 min prn, Martínez Reilly MD, 20 mL at 07/13/19 0643     sodium chloride (PF) 0.9% PF flush 3 mL, 3 mL, Intracatheter, Q8H, Martínez Reilly MD, 30 mL at 07/14/19 0748     ASSESSMENT/PLAN:    Karo Lindsay is a 49 year old female POD #4 s/p REVISION, URINARY CONDUIT and placement of epidural catheter for analgesia.  Pt is receiving adequate analgesia with bupivacaine 0.125%, total infusion 8 mL/hour.  Pt is ambulating without difficulty.  No weakness or paresthesias, adequate sensory block.  No  evidence of adverse side effects associated with local anesthetic.     - continue current total infusion of 8mL/hour  - will continue to follow and adjust as needed  - plan for likely removal of catheter tomorrow, 7/15/2019      Loki Miller MD  Perioperative and Interventional Pain Service  7/14/2019 3:13 PM  PIPS 24-hour Job Code Pagers (for in-house use only, may text page using [employer])  [place]:  [phone]  [place]:  [phone]  Peds [phone]: [phone]

## 2019-07-14 NOTE — PLAN OF CARE
OT 5A  Discharge Planner OT   Patient plan for discharge: Home  Current status: Mod A supine to EOB. CGA sit<>stand x3 reps throughout session with FWW. Pt took ~ 4 steps to recliner with FWW and CGA. Pt completed g/h while standing with CGA and set up required. Pt stood for ~10 minutes while completing dynamic reaching and marching in place with FWW and CGA.   Barriers to return to prior living situation: fatigue, pain, acute medical needs  Recommendations for discharge: Home with assist  Rationale for recommendations: Pt is progressing well with therapy and has assist available at home as needed at discharge. Pt would benefit from continued skilled therapy while IP to increase activity tolerance and independence with ADL/IADL completion       Entered by: Suzanne Huerta 07/14/2019 11:03 AM

## 2019-07-14 NOTE — PROVIDER NOTIFICATION
Provider notified re: pt's temp of 101.3. Pt's temp half hour prior was 100.3 - gave scheduled 975 mg tylenol and rechecked, temp increased to 101.3. MDs paged.

## 2019-07-14 NOTE — PROGRESS NOTES
REGIONAL ANESTHESIA PAIN SERVICE (RAPS) EVALUATION:  - Time: 10 pm Called to evaluate patient for pain.   - Evaluation: Patient reports pain intensity with current therapy 3/10 at rest and 5/10 with activity  General: healthy, alert and no distress  Catheter system integrity: intact  Skin: dressing clean, dry and intact   .  Current vitals: /57, , RR 16    - Assessment/Plan  ? PAIN:  well controlled with current pain regimen.   ? INTERVENTION:   Bolus administered    MEDICATION: PF bupivacaine 2.5%, total bolus 6 mL    PROCEDURE: Clinician bolus via epidural catheter; administered without complication with negative aspirate before and between each mL.  No symptoms of local anesthetic systemic toxicity (LAST). Remained with and assessed patient for 10 min post-injection. BP, P and MAP stable    POST-PROCEDURE: Bedside RN aware of need to continue BP, P and MAP monitoring Q 10 min for an additional 30 min. Contact RAPS if any of the following: patient experiencing any untoward effects, SBP< 90, P < 50 or > 120, MAP < 60                - patient can be evaluated to receive local anesthetic bolus Q 12 hr PRN pain not controlled with continuous infusion.  Bedside nurse must page RAPS to request bolus     Aren Mari MD  [place] 2 Anesthesia resident    RAPS Contact Info (24 hour job code pager is the last 4 digits) For in-house use only:  [employer] phone: [place] [phone], [place] [employer] [phone], [facility] [phone], then enter call-back number.    Text: Use [employer] on the Intranet <Paging/Directory> tab and enter Jobcode ID.   If no call back at any time, contact the hospital  and ask for RAPS attending or backup

## 2019-07-14 NOTE — ADDENDUM NOTE
Addendum  created 07/14/19 0945 by Travis Barnes MD    Cosign clinical note with attestation, Sign clinical note

## 2019-07-14 NOTE — PROGRESS NOTES
REGIONAL ANESTHESIA PAIN SERVICE CONTINUOUS NERVE INFUSION NOTE  SUBJECTIVE:  Interval History: Pt reports adequate pain control via continuous peripheral nerve block (CPNB) infusion.  Denies any weakness, paresthesias, circumoral numbness, metallic taste or tinnitus.  Pt is ambulating with assistance.  Patient is currently with no nausea or vomiting.                    Clinically Aligned Pain Assessment (CAPA):   Comfort (How is your pain?): Comfortably manageable  Change in Pain (Since your last medication/intervention?): About the same  Pain Control (How are your pain treatments working?):  Fully effective pain control  Functioning (Are you able to do activities to get better?) : Can do most things, but pain gets in the way of some   Sleep (Does your pain management allow you to sleep or rest?): Normal sleep                 Numerical Rating Scale:  3/10 at rest and 5/10 with movement.          Anticoagulation:  none        OBJECTIVE:     Diagnostic:        Lab Results   Component Value Date     WBC 8.8 07/13/2019            Lab Results   Component Value Date     RBC 2.78 07/13/2019            Lab Results   Component Value Date     HGB 7.8 07/13/2019            Lab Results   Component Value Date     HCT 26.5 07/13/2019            Lab Results   Component Value Date      07/13/2019            Vitals:    Temp:  [36.5  C (97.7  F)-38.9  C (102  F)] 37.7  C (99.8  F)  Pulse:  [104] 104  Heart Rate:  [] 101  Resp:  [16-18] 16  BP: (113-137)/(47-59) 137/59  SpO2:  [96 %-100 %] 97 %     Exam:               Strength 5/5 and symmetric grossly in bilateral LE              Level to cold sensation:equal              Epidural catheter site with dressing c/d/i, no tenderness, erythema, heme, edema     Current Facility-Administered Medications:      acetaminophen (TYLENOL) tablet 650 mg, 650 mg, Oral, Q4H PRN, Martínez Reilly MD, 650 mg at 07/13/19 0346     acetaminophen (TYLENOL) tablet 975 mg, 975 mg, Oral,  Q8H, Mariposa Baez PA, 975 mg at 07/13/19 0830     benzocaine-menthol (CEPACOL) 15-3.6 MG lozenge 1-2 lozenge, 1-2 lozenge, Buccal, Q1H PRN, Martínez Reilly MD     bupivacaine (MARCAINE) 0.125 % in sodium chloride 0.9 % 250 mL EPIDURAL Infusion, , EPIDURAL, Continuous, Klarissa Santos MD, Last Rate: 8 mL/hr at 07/13/19 0712     dextrose 5% and 0.9% NaCl with potassium chloride 20 mEq infusion, , Intravenous, Continuous, Mariposa Baez PA, Last Rate: 100 mL/hr at 07/13/19 0346     HYDROmorphone (DILAUDID) PCA 1 mg/mL OPIOID NAIVE, , Intravenous, Continuous, Mariposa Baez PA     ketorolac tromethamine (ACULAR-LS) 0.4 % ophthalmic solution 1 drop, 1 drop, Both Eyes, TID, Mariposa Baez PA, 1 drop at 07/13/19 0830     lidocaine (LMX4) cream, , Topical, Q1H PRN, Martínez Reilly MD     lidocaine 1 % 0.1-1 mL, 0.1-1 mL, Other, Q1H PRN, Martínez Reilly MD     magnesium sulfate 2 g in NS intermittent infusion ([employer]ar[employer]ium or [employer]V Cmpd), 2 g, Intravenous, Daily PRN, Mariposa Baez PA     magnesium sulfate 4 g in 100 mL sterile water (premade), 4 g, Intravenous, Q4H PRN, Mariposa Baez PA, 4 g at 07/12/19 1400     naloxone (NARCAN) injection 0.1-0.4 mg, 0.1-0.4 mg, Intravenous, Q2 Min PRN, Martínez Reilly MD     ondansetron (ZOFRAN-ODT) ODT tab 4 mg, 4 mg, Oral, Q6H PRN **OR** ondansetron (ZOFRAN) injection 4 mg, 4 mg, Intravenous, Q6H PRN, Martínez Reilly MD     potassium chloride 10 mEq in 100 mL intermittent infusion with 10 mg lidocaine, 10 mEq, Intravenous, Q1H PRN, Mariposa Baez PA     potassium chloride 20 mEq in 50 mL intermittent infusion, 20 mEq, Intravenous, Q1H PRN, Mariposa Baez PA, Last Rate: 50 mL/hr at 07/13/19 0829, 20 mEq at 07/13/19 0829     potassium phosphate 15 mmol in D5W 250 mL intermittent infusion, 15 mmol, Intravenous, Daily PRN, Mariposa Baez PA, 15 mmol at 07/12/19 1627     potassium phosphate 20 mmol in D5W 250  mL intermittent infusion, 20 mmol, Intravenous, Q6H PRN, Mariposa Baez PA     potassium phosphate 20 mmol in D5W 500 mL intermittent infusion, 20 mmol, Intravenous, Q6H PRN, Mariposa Baez PA     potassium phosphate 25 mmol in D5W 500 mL intermittent infusion, 25 mmol, Intravenous, Q8H PRN, Mariposa Baez PA     prednisoLONE acetate (PRED FORTE) 1 % ophthalmic susp 1 drop, 1 drop, Both Eyes, TID, Martínez Reilly MD, 1 drop at 07/13/19 0830     prochlorperazine (COMPAZINE) injection 10 mg, 10 mg, Intravenous, Q6H PRN **OR** prochlorperazine (COMPAZINE) tablet 10 mg, 10 mg, Oral, Q6H PRN, Martínez Reilly MD     senna-docusate (SENOKOT-S/PERICOLACE) 8.6-50 MG per tablet 1 tablet, 1 tablet, Oral, BID **OR** senna-docusate (SENOKOT-S/PERICOLACE) 8.6-50 MG per tablet 2 tablet, 2 tablet, Oral, BID, Martínez Reilly MD     sodium chloride (PF) 0.9% PF flush 3 mL, 3 mL, Intracatheter, q1 min prn, Martínez Reilly MD, 20 mL at 07/13/19 0643     sodium chloride (PF) 0.9% PF flush 3 mL, 3 mL, Intracatheter, Q8H, Martínez Reilly MD, 3 mL at 07/13/19 0832     ASSESSMENT/PLAN:    Karo Lindsay is a 49 year old female POD #3 s/p REVISION, URINARY CONDUIT and placement of epidural catheter for analgesia.  Pt is receiving adequate analgesia with bupivacaine 0.125%, total infusion 8 mL/hour.  Pt is   ambulating without difficulty.  No weakness or paresthesias,   adequate sensory block.  No  evidence of adverse side effects associated with local anesthetic.She received a bolus dose of 0.25% bupivacaine 6 ml at 1.45 pm      - continue current total infusion of 8mL/hour  - will continue to follow and adjust as needed      Loki Miller MD  Perioperative and Interventional Pain Service  7/13/2019 11:54 AM  PIPS 24-hour Job Code Pagers (for in-house use only, may text page using [employer])  [place]:  [phone]  [place]:  [phone]  Peds PIPS: [phone]

## 2019-07-15 ENCOUNTER — APPOINTMENT (OUTPATIENT)
Dept: PHYSICAL THERAPY | Facility: CLINIC | Age: 49
End: 2019-07-15
Attending: UROLOGY
Payer: COMMERCIAL

## 2019-07-15 ENCOUNTER — APPOINTMENT (OUTPATIENT)
Dept: OCCUPATIONAL THERAPY | Facility: CLINIC | Age: 49
End: 2019-07-15
Attending: UROLOGY
Payer: COMMERCIAL

## 2019-07-15 LAB
ANION GAP SERPL CALCULATED.3IONS-SCNC: 4 MMOL/L (ref 3–14)
BUN SERPL-MCNC: 5 MG/DL (ref 7–30)
CALCIUM SERPL-MCNC: 7.6 MG/DL (ref 8.5–10.1)
CHLORIDE SERPL-SCNC: 104 MMOL/L (ref 94–109)
CO2 SERPL-SCNC: 29 MMOL/L (ref 20–32)
COPATH REPORT: NORMAL
CREAT SERPL-MCNC: 0.71 MG/DL (ref 0.52–1.04)
ERYTHROCYTE [DISTWIDTH] IN BLOOD BY AUTOMATED COUNT: 13.4 % (ref 10–15)
GFR SERPL CREATININE-BSD FRML MDRD: >90 ML/MIN/{1.73_M2}
GLUCOSE SERPL-MCNC: 132 MG/DL (ref 70–99)
HCT VFR BLD AUTO: 25.6 % (ref 35–47)
HGB BLD-MCNC: 7.9 G/DL (ref 11.7–15.7)
MAGNESIUM SERPL-MCNC: 1.7 MG/DL (ref 1.6–2.3)
MCH RBC QN AUTO: 28.7 PG (ref 26.5–33)
MCHC RBC AUTO-ENTMCNC: 30.9 G/DL (ref 31.5–36.5)
MCV RBC AUTO: 93 FL (ref 78–100)
PLATELET # BLD AUTO: 217 10E9/L (ref 150–450)
POTASSIUM SERPL-SCNC: 3.3 MMOL/L (ref 3.4–5.3)
POTASSIUM SERPL-SCNC: 3.4 MMOL/L (ref 3.4–5.3)
RBC # BLD AUTO: 2.75 10E12/L (ref 3.8–5.2)
SODIUM SERPL-SCNC: 136 MMOL/L (ref 133–144)
WBC # BLD AUTO: 8.3 10E9/L (ref 4–11)

## 2019-07-15 PROCEDURE — 25000132 ZZH RX MED GY IP 250 OP 250 PS 637: Performed by: PHYSICIAN ASSISTANT

## 2019-07-15 PROCEDURE — 83735 ASSAY OF MAGNESIUM: CPT | Performed by: UROLOGY

## 2019-07-15 PROCEDURE — 25000128 H RX IP 250 OP 636: Performed by: STUDENT IN AN ORGANIZED HEALTH CARE EDUCATION/TRAINING PROGRAM

## 2019-07-15 PROCEDURE — 97530 THERAPEUTIC ACTIVITIES: CPT | Mod: GP | Performed by: REHABILITATION PRACTITIONER

## 2019-07-15 PROCEDURE — 12000001 ZZH R&B MED SURG/OB UMMC

## 2019-07-15 PROCEDURE — 25000128 H RX IP 250 OP 636: Performed by: PHYSICIAN ASSISTANT

## 2019-07-15 PROCEDURE — 25000132 ZZH RX MED GY IP 250 OP 250 PS 637: Performed by: STUDENT IN AN ORGANIZED HEALTH CARE EDUCATION/TRAINING PROGRAM

## 2019-07-15 PROCEDURE — G0463 HOSPITAL OUTPT CLINIC VISIT: HCPCS

## 2019-07-15 PROCEDURE — 25000125 ZZHC RX 250: Performed by: PHYSICIAN ASSISTANT

## 2019-07-15 PROCEDURE — 97535 SELF CARE MNGMENT TRAINING: CPT | Mod: GO

## 2019-07-15 PROCEDURE — 40000802 ZZH SITE CHECK

## 2019-07-15 PROCEDURE — 85027 COMPLETE CBC AUTOMATED: CPT | Performed by: UROLOGY

## 2019-07-15 PROCEDURE — 97116 GAIT TRAINING THERAPY: CPT | Mod: GP | Performed by: REHABILITATION PRACTITIONER

## 2019-07-15 PROCEDURE — 80048 BASIC METABOLIC PNL TOTAL CA: CPT | Performed by: UROLOGY

## 2019-07-15 PROCEDURE — 84132 ASSAY OF SERUM POTASSIUM: CPT | Performed by: UROLOGY

## 2019-07-15 PROCEDURE — 36592 COLLECT BLOOD FROM PICC: CPT | Performed by: UROLOGY

## 2019-07-15 RX ADMIN — PREDNISOLONE ACETATE 1 DROP: 10 SUSPENSION/ DROPS OPHTHALMIC at 08:14

## 2019-07-15 RX ADMIN — ACETAMINOPHEN 975 MG: 325 TABLET, FILM COATED ORAL at 05:17

## 2019-07-15 RX ADMIN — HYDROMORPHONE HYDROCHLORIDE 4 MG: 2 TABLET ORAL at 20:48

## 2019-07-15 RX ADMIN — HYDROMORPHONE HYDROCHLORIDE 4 MG: 2 TABLET ORAL at 03:51

## 2019-07-15 RX ADMIN — HYDROMORPHONE HYDROCHLORIDE 0.4 MG: 1 INJECTION, SOLUTION INTRAMUSCULAR; INTRAVENOUS; SUBCUTANEOUS at 21:57

## 2019-07-15 RX ADMIN — PREDNISOLONE ACETATE 1 DROP: 10 SUSPENSION/ DROPS OPHTHALMIC at 20:19

## 2019-07-15 RX ADMIN — KETOROLAC TROMETHAMINE 1 DROP: 4 SOLUTION/ DROPS OPHTHALMIC at 20:20

## 2019-07-15 RX ADMIN — POTASSIUM CHLORIDE 20 MEQ: 29.8 INJECTION, SOLUTION INTRAVENOUS at 16:03

## 2019-07-15 RX ADMIN — HYDROMORPHONE HYDROCHLORIDE 0.4 MG: 1 INJECTION, SOLUTION INTRAMUSCULAR; INTRAVENOUS; SUBCUTANEOUS at 10:51

## 2019-07-15 RX ADMIN — KETOROLAC TROMETHAMINE 1 DROP: 4 SOLUTION/ DROPS OPHTHALMIC at 13:33

## 2019-07-15 RX ADMIN — HYDROMORPHONE HYDROCHLORIDE 0.4 MG: 1 INJECTION, SOLUTION INTRAMUSCULAR; INTRAVENOUS; SUBCUTANEOUS at 18:44

## 2019-07-15 RX ADMIN — HYDROMORPHONE HYDROCHLORIDE 0.4 MG: 1 INJECTION, SOLUTION INTRAMUSCULAR; INTRAVENOUS; SUBCUTANEOUS at 01:15

## 2019-07-15 RX ADMIN — HYDROMORPHONE HYDROCHLORIDE 4 MG: 2 TABLET ORAL at 08:25

## 2019-07-15 RX ADMIN — HYDROMORPHONE HYDROCHLORIDE 4 MG: 2 TABLET ORAL at 17:08

## 2019-07-15 RX ADMIN — ACETAMINOPHEN 975 MG: 325 TABLET, FILM COATED ORAL at 13:32

## 2019-07-15 RX ADMIN — HYDROMORPHONE HYDROCHLORIDE 4 MG: 2 TABLET ORAL at 12:33

## 2019-07-15 RX ADMIN — POTASSIUM CHLORIDE 20 MEQ: 29.8 INJECTION, SOLUTION INTRAVENOUS at 10:49

## 2019-07-15 RX ADMIN — KETOROLAC TROMETHAMINE 1 DROP: 4 SOLUTION/ DROPS OPHTHALMIC at 08:14

## 2019-07-15 RX ADMIN — ACETAMINOPHEN 975 MG: 325 TABLET, FILM COATED ORAL at 21:58

## 2019-07-15 RX ADMIN — HYDROMORPHONE HYDROCHLORIDE 0.4 MG: 1 INJECTION, SOLUTION INTRAMUSCULAR; INTRAVENOUS; SUBCUTANEOUS at 15:58

## 2019-07-15 RX ADMIN — HYDROMORPHONE HYDROCHLORIDE 0.4 MG: 1 INJECTION, SOLUTION INTRAMUSCULAR; INTRAVENOUS; SUBCUTANEOUS at 05:17

## 2019-07-15 RX ADMIN — PREDNISOLONE ACETATE 1 DROP: 10 SUSPENSION/ DROPS OPHTHALMIC at 13:33

## 2019-07-15 RX ADMIN — LEVOFLOXACIN 500 MG: 5 INJECTION, SOLUTION INTRAVENOUS at 17:09

## 2019-07-15 RX ADMIN — HYDROMORPHONE HYDROCHLORIDE 0.4 MG: 1 INJECTION, SOLUTION INTRAMUSCULAR; INTRAVENOUS; SUBCUTANEOUS at 13:32

## 2019-07-15 RX ADMIN — POTASSIUM CHLORIDE 20 MEQ: 29.8 INJECTION, SOLUTION INTRAVENOUS at 09:40

## 2019-07-15 RX ADMIN — Medication 2 G: at 12:34

## 2019-07-15 ASSESSMENT — ACTIVITIES OF DAILY LIVING (ADL)
ADLS_ACUITY_SCORE: 15
ADLS_ACUITY_SCORE: 15
ADLS_ACUITY_SCORE: 14
ADLS_ACUITY_SCORE: 13
ADLS_ACUITY_SCORE: 15
ADLS_ACUITY_SCORE: 14

## 2019-07-15 NOTE — PROGRESS NOTES
REGIONAL ANESTHESIA PAIN SERVICE FOLLOW UP NOTE  Karo Lindsay is a 49 year old female POD #5 s/p REVISION, URINARY CONDUIT and placement of Epidrual T7-8 catheter for pain management.    SUBJECTIVE  Interval History: Overnight no acute events.  Patient reports sitting up in recliner at bedside reports current pain rating after oral Dilaudid is 2/10, pain control adequate.  Denies weakness, paresthesias, circumoral numbness, metallic taste or tinnitus.  Ambulating with assistance.  Currently tolerating regular diet, denies nausea or vomiting.  Artis drainage bag to urostomy, clear gloria urine.  Plans are to remove epidural catheter today      Antithrombotic/Thrombolytic Therapy: None ordered     Medications related to Pain Management (From now, onward)    Start     Dose/Rate Route Frequency Ordered Stop    07/14/19 0941  HYDROmorphone (PF) (DILAUDID) injection 0.2-0.4 mg      0.2-0.4 mg  over 2-10 Minutes Intravenous EVERY 2 HOURS PRN 07/14/19 0943      07/14/19 0941  HYDROmorphone (DILAUDID) tablet 2-4 mg      2-4 mg Oral EVERY 4 HOURS PRN 07/14/19 0943      07/12/19 1400  acetaminophen (TYLENOL) tablet 975 mg      975 mg Oral EVERY 8 HOURS 07/12/19 1028 07/17/19 1359    07/11/19 0800  senna-docusate (SENOKOT-S/PERICOLACE) 8.6-50 MG per tablet 1 tablet      1 tablet Oral 2 TIMES DAILY 07/11/19 0017      07/11/19 0800  senna-docusate (SENOKOT-S/PERICOLACE) 8.6-50 MG per tablet 2 tablet      2 tablet Oral 2 TIMES DAILY 07/11/19 0017      07/11/19 0017  lidocaine 1 % 0.1-1 mL      0.1-1 mL Other EVERY 1 HOUR PRN 07/11/19 0017      07/11/19 0017  lidocaine (LMX4) cream       Topical EVERY 1 HOUR PRN 07/11/19 0017      07/11/19 0017  acetaminophen (TYLENOL) tablet 650 mg      650 mg Oral EVERY 4 HOURS PRN 07/11/19 0017      07/10/19 1315  bupivacaine (MARCAINE) 0.125 % in sodium chloride 0.9 % 250 mL EPIDURAL Infusion      8 mL/hr  EPIDURAL CONTINUOUS 07/10/19 1310              OBJECTIVE  Lab Results     Recent Labs  "  Lab Test 07/15/19  0654   WBC 8.3   RBC 2.75*   HGB 7.9*   HCT 25.6*   MCV 93   MCH 28.7   MCHC 30.9*   RDW 13.4        Lab Results   Component Value Date    INR 1.0 04/24/2018       Vitals: Blood pressure 146/62, pulse 113, temperature 99.4  F (37.4  C), temperature source Oral, resp. rate 18, height 1.473 m (4' 10\"), weight 69.4 kg (153 lb), SpO2 95 %.    Exam:  Constitutional: alert and no distress  Neuro/MSK:  Strength BLE 5/5  and overall symmetric  Skin: epidural insertion site c/d/i, no tenderness, erythema, heme, edema.      ASSESSMENT  Karo Lindsay is a 49 year old female POD #5 s/p REVISION, URINARY CONDUIT and placement of Epidrual T7-8 catheter for pain management.    Patient currently achieving adequate pain control with epidural catheter/infusion and multimodal analgesia.  Patient is meeting activity goals. No weakness or paresthesias. No evidence of adverse side effects associated with local anesthetic.     PLAN  0845 epidural catheter removed without complication, dark tip intact.  Insertion site c/d/i. Small bandage applied  - Primary service and Bedside RN aware of plans.  Thank you for allowing us the opportunity to participate in the care of Karo Lindsay  - will sign off    - discussed plan with attending anesthesiologist    LISHA Greene [facility]   Regional Anesthesia Pain Service      RAPS Contact Info (for in-house use only):  Job code ID: [id number]   [facility] 0599  [facility] 0602  [place] phone: dial [phone], enter jobcode ID, then enter call-back number.    Text: Use [employer] on the Intranet <Paging/Directory> tab and enter Jobcode ID.   If no call back at any time, contact the hospital  and ask for RAPS attending or backup   "

## 2019-07-15 NOTE — PROGRESS NOTES
WOC Nurse Inpatient [facility]   WO Nurse Inpatient Adult     Initial Assessment   Assessment of revised ileal conduit Stoma complications: none  Mucocutaneous junction Ileal conduit: intact   Peristomal complication(s) ileal conduit: none  Pouch wear time:3-4 days,   Following today's visit:Patient is  able to demonstrate; patient has had ileal conduit and ileostomy since birth      1. How to empty their pouch? yes      2. How to change their pouch?  yes      3. How to read and record intake and output correctly? Yes    Assessment of established end Ileostomy Stoma   complication(s) prolapse   MCJ: intact  Peristomal complications: erosions to peristomal skin, per patient have always been there, has had issues with leakage off and on throughout her life.     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  Current Diet/Nutrition: Orders Placed This Encounter      Regular Diet Adult     TPN no   I/O last 3 completed shifts:  In: 60 [I.V.:60]  Out: 6265 [Urine:4950; Drains:105; Stool:1210]  Labs:    Recent Labs   Lab 07/15/19  0654   HGB 7.9*   WBC 8.3        Physical Exam:  Ileal Conduit s/p revision on 7/10/19  Current pouching system:[place] two piece flat,  Reason for pouch change today: ostomy education and routine schedule  Stoma appearance: viable, healthy, edematous  Stoma size; 38mm, one stent in place  Peristomal skin: intact  Stoma output :gloria   Abdominal  Assessment  soft , NG still in place? No  Surgical Site: staples intact  Pain: Cramping and Sharp  Is patient still on a PCA no    Ileostomy, established   Current pouching system:[place] soft convex fecal pouch cut to 30mm, andreea ring x 3, and coloplast protective sheet. Powder and barrier film applied to peristomal skin as well.   Reason for pouch change  today: leakage  Stoma appearance: viable, healthy, normal-appearing and prolapsed  Stoma size; pouch cut to 30mm, stomal base is larger however.   Peristomal skin: chronic erosions  Stoma output :brown, paste like in consistency     Interventions:  Patient's chart evaluated.  Focus of today's visit: refitting of appliance, evaluate leakage issue and getting supplies for patient   Participant of teaching session today patient  and spouse  Change made with ostomy management today: No  Patient/family: lethargic and observing  Supplies:Ordered soft convex one piece fecal pouch, andreea ring and protective sheet. (for ileostomy)    Plan:  Learning needs: compelted  Preparation for discharge: No discharge preparations started  Recommend home care? yes and by home WOC nurse if possible, patient requesting some assistance after discharge     Discussed plan of care with Patient and Nurse  Nursing to notify the Provider(s) and re-consult the WOC Nurse if new ostomy concerns or discharge planned before next planned WOC visit.    WOC Nurse will return: Tuesday for pouch check  Face to face time: 45 minutes    Vonda Cameron

## 2019-07-15 NOTE — PLAN OF CARE
Pt admitted for ileal conduit takedown. A&Ox4. VSS on 2L for comfort overnight. C/o pain at incision/drain site, PRN PO and IV dilaudid given throughout shift. Bupivacaine epidural at 8ml/hr running, anesthesia was by to change epidural dressing. Pain controlled fairly well over night. No c/o nausea. Tolerates reg diet well w/ good appetite. PICC SL w/ blood return noted. Electrolytes replaced yesterday, recheck this AM. BLE swelling, pt refused PCD's overnight, kept elevated on pillows. LEANDRO output of 20 ml serosanguinous drainage.  500 ml output from colostomy.  [id number] UO from urostomy. All dressings CDI. Abd incision well approximated w/ staples, open to air. +3 edema in groin. Diaphoretic w/ chills overnight, remained afebrile overnight. Up w/ assist of 1, encourage splinting. Able to make needs known. Cotninue IV abx today. Will continue with POC.

## 2019-07-15 NOTE — PROGRESS NOTES
"I saw and evaluated the patient and agree with the resident note and plan of care.     Adriana Najera MD  Reconstructive Urology Fellow  -----------------------------------------------------------------------------------------------------------------------------------    Urology  Progress Note    - Tmax 102.4, levofloxacin added yesterday  - pain well controlled after PCA dc'd  - LE US negative for DVT  - passing gas    Exam  /62 (BP Location: Left leg)   Pulse 113   Temp 100.9  F (38.3  C) (Oral)   Resp 18   Ht 1.473 m (4' 10\")   Wt 69.4 kg (153 lb)   SpO2 96%   BMI 31.98 kg/m    No acute distress  Tachycardic, regular rhythm  Unlabored breathing on 1L NC  Abdomen soft, non-distended, appropriately tender to palpation. L abdominal ileostomy w/  brown liquid and gas. Laparotomy incision C/D/I with staples, no clear evidence of fluctuance/drainage   R abdominal urostomy with dark UOP, mucous in bag, stoma pink and viable with single stent. Yellow urine in bag.   LEANDRO w/ serous output    UOP 2950/2000  LEANDRO 85/20    Labs  AM labs pending    UCx pending  BCx pending  Urine fungal culture pending    Imaging  CXR 7/15  Impression:   1. Postoperative changes of the left upper lobe resection, with  atelectasis along the periareolar major fissure.  2. Small bilateral pleural effusions with overlying subsegmental  atelectasis/consolidations. Favor atelectasis given the symmetry,  however infection is not excluded.    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     Neuro: tylenol, dilaudid IV and PO PRN. Epidural in place, may remove today per RAPS.   CV: HDS. Persistent tachycardia. LE US 7/14 negative for DVT.   Pulm: incentive spirometry while awake  FEN/GI: regular diet, no MIVF  Endo: DIRK   : monitor urine output  Heme/ID: Hgb stable. Continues to be febrile, CTM " leukocytosis. F/u cultures. Levaquin added 7/14.   Activity: Up with assist. Encourage ambulation.   PPx: SCDs  Dispo: pending. WOCRN to assist with stoma cares.     Seen and examined with chief resident. Will discuss with Dr. Causey.    Fiordaliza Otero MD  Urology PGY-3           Contacting the Urology Team     Please use the following job codes to reach the Urology Team. Note that you must use an in house phone and that job codes cannot receive text pages.     On weekdays, dial [phone] (or star-star-star [phone] on the new [employer] telephones) then [phone] to reach the Adult Urology resident or PA on call    On weekdays, dial [phone] (or star-star-star [phone] on the new [employer] telephones) then [phone] to reach the Pediatric Urology resident    On weeknights and weekends, dial [phone] (or star-star-star [phone] on the new [employer] telephones) then [phone] to reach the Urology resident on call (for both Adult and Pediatrics)

## 2019-07-15 NOTE — PLAN OF CARE
Discharge Planner OT   Patient plan for discharge: prefers to discharge home; contemplating rehab as an option   Current status: Pt continues to be limited by pain/nausea and fatigues quickly with minimal activity. Pt CGA-min A for sit > stands and ambulated to bathroom with CGA and FWW. Pt needing frequent seated rest breaks/completion of ADL activities seated. Pt needing max A for LB bathing/dressing.   Barriers to return to prior living situation: medical management, pain, needing increased assistance for ADLs and assistive device for mobility, stairs to bedroom/shower  Recommendations for discharge: TCU   Rationale for recommendations: Recommend continued OT intervention to address deficits in strength, endurance, and balance leading to decreased ADL I. Pt making slow,steady progress towards PLOF however continues pt present well below her baseline.        Entered by: Carol Wu 07/15/2019 11:13 AM

## 2019-07-15 NOTE — PLAN OF CARE
Pt AO, intermittently lethargic. VSS on RA, occasional desat to mid-80%, placed on 2L O2. Enc use of IS. Up with assist x1 + walker. C/o abd pain, managed with dilaudid, alternating between IV and PO. No nausea, tolerating PO. Urostomy with good UOP. Ileostomy intact with stool noted. LEANDRO intact with serousang drainage. Midline incision with staples c/d/i. Epidural discontinued today. Ok to shower tomorrow.  visiting. Recs for TCU.

## 2019-07-15 NOTE — PROGRESS NOTES
Social Work Services Progress Note    Hospital Day: 6  Collaborated with:  Primary team urology, PT/OT, pt, pt's spouse    Data:  Pt is a 49-year-old female admitted to [facility] 7/10/19.     Intervention:  Updated by PT and OT staff that recommendation for discharge is TCU. PT especially concerned about pt being able to do stairs at home. Met with pt, pt's spouse, and pt's friend at bedside. Pt unsure about TCU placement and is thinking about it. Provided pt and spouse with list of TCU facilities near their home. Will check in with pt tomorrow. Per primary team, planning for discharge in 1-2 days if pt does not have any more fevers.    Assessment:  TCU recommended; pt considering this    Plan:    Anticipated Disposition:  Facility:  TCU    Barriers to d/c plan:  Medical stability    Follow Up:  SW to follow for discharge planning    RACHEL Oakley, MINERVASW  5th Floor   Pager [phone]  Phone [phone]

## 2019-07-16 ENCOUNTER — APPOINTMENT (OUTPATIENT)
Dept: OCCUPATIONAL THERAPY | Facility: CLINIC | Age: 49
End: 2019-07-16
Attending: UROLOGY
Payer: COMMERCIAL

## 2019-07-16 ENCOUNTER — APPOINTMENT (OUTPATIENT)
Dept: GENERAL RADIOLOGY | Facility: CLINIC | Age: 49
End: 2019-07-16
Attending: STUDENT IN AN ORGANIZED HEALTH CARE EDUCATION/TRAINING PROGRAM
Payer: COMMERCIAL

## 2019-07-16 LAB
ANION GAP SERPL CALCULATED.3IONS-SCNC: 5 MMOL/L (ref 3–14)
BACTERIA SPEC CULT: ABNORMAL
BACTERIA SPEC CULT: ABNORMAL
BACTERIA SPEC CULT: NORMAL
BACTERIA SPEC CULT: NORMAL
BUN SERPL-MCNC: 6 MG/DL (ref 7–30)
CALCIUM SERPL-MCNC: 8 MG/DL (ref 8.5–10.1)
CHLORIDE SERPL-SCNC: 103 MMOL/L (ref 94–109)
CO2 SERPL-SCNC: 30 MMOL/L (ref 20–32)
CREAT FLD-MCNC: 0.8 MG/DL
CREAT SERPL-MCNC: 0.71 MG/DL (ref 0.52–1.04)
ERYTHROCYTE [DISTWIDTH] IN BLOOD BY AUTOMATED COUNT: 13.3 % (ref 10–15)
GFR SERPL CREATININE-BSD FRML MDRD: >90 ML/MIN/{1.73_M2}
GLUCOSE SERPL-MCNC: 120 MG/DL (ref 70–99)
HCT VFR BLD AUTO: 26.6 % (ref 35–47)
HGB BLD-MCNC: 8.2 G/DL (ref 11.7–15.7)
Lab: ABNORMAL
MCH RBC QN AUTO: 28.9 PG (ref 26.5–33)
MCHC RBC AUTO-ENTMCNC: 30.8 G/DL (ref 31.5–36.5)
MCV RBC AUTO: 94 FL (ref 78–100)
PLATELET # BLD AUTO: 243 10E9/L (ref 150–450)
POTASSIUM SERPL-SCNC: 3.5 MMOL/L (ref 3.4–5.3)
RBC # BLD AUTO: 2.84 10E12/L (ref 3.8–5.2)
SODIUM SERPL-SCNC: 137 MMOL/L (ref 133–144)
SPECIMEN SOURCE FLD: NORMAL
SPECIMEN SOURCE: ABNORMAL
SPECIMEN SOURCE: NORMAL
SPECIMEN SOURCE: NORMAL
WBC # BLD AUTO: 7.3 10E9/L (ref 4–11)

## 2019-07-16 PROCEDURE — 82570 ASSAY OF URINE CREATININE: CPT | Performed by: STUDENT IN AN ORGANIZED HEALTH CARE EDUCATION/TRAINING PROGRAM

## 2019-07-16 PROCEDURE — 80048 BASIC METABOLIC PNL TOTAL CA: CPT | Performed by: STUDENT IN AN ORGANIZED HEALTH CARE EDUCATION/TRAINING PROGRAM

## 2019-07-16 PROCEDURE — 25000128 H RX IP 250 OP 636: Performed by: STUDENT IN AN ORGANIZED HEALTH CARE EDUCATION/TRAINING PROGRAM

## 2019-07-16 PROCEDURE — 40000558 ZZH STATISTIC CVC DRESSING CHANGE

## 2019-07-16 PROCEDURE — 25000132 ZZH RX MED GY IP 250 OP 250 PS 637: Performed by: STUDENT IN AN ORGANIZED HEALTH CARE EDUCATION/TRAINING PROGRAM

## 2019-07-16 PROCEDURE — 97535 SELF CARE MNGMENT TRAINING: CPT | Mod: GO

## 2019-07-16 PROCEDURE — 25000128 H RX IP 250 OP 636: Performed by: PHYSICIAN ASSISTANT

## 2019-07-16 PROCEDURE — 74018 RADEX ABDOMEN 1 VIEW: CPT

## 2019-07-16 PROCEDURE — 25000132 ZZH RX MED GY IP 250 OP 250 PS 637: Performed by: PHYSICIAN ASSISTANT

## 2019-07-16 PROCEDURE — 12000001 ZZH R&B MED SURG/OB UMMC

## 2019-07-16 PROCEDURE — 36592 COLLECT BLOOD FROM PICC: CPT | Performed by: STUDENT IN AN ORGANIZED HEALTH CARE EDUCATION/TRAINING PROGRAM

## 2019-07-16 PROCEDURE — 40000901 ZZH STATISTIC WOC PT EDUCATION, 0-15 MIN

## 2019-07-16 PROCEDURE — 40000802 ZZH SITE CHECK

## 2019-07-16 PROCEDURE — 85027 COMPLETE CBC AUTOMATED: CPT | Performed by: STUDENT IN AN ORGANIZED HEALTH CARE EDUCATION/TRAINING PROGRAM

## 2019-07-16 RX ORDER — CYCLOBENZAPRINE HCL 5 MG
5 TABLET ORAL 3 TIMES DAILY PRN
Status: DISCONTINUED | OUTPATIENT
Start: 2019-07-16 | End: 2019-07-21 | Stop reason: HOSPADM

## 2019-07-16 RX ORDER — KETOROLAC TROMETHAMINE 15 MG/ML
7.5 INJECTION, SOLUTION INTRAMUSCULAR; INTRAVENOUS
Status: COMPLETED | OUTPATIENT
Start: 2019-07-16 | End: 2019-07-16

## 2019-07-16 RX ORDER — IBUPROFEN 600 MG/1
600 TABLET, FILM COATED ORAL EVERY 6 HOURS PRN
Status: DISCONTINUED | OUTPATIENT
Start: 2019-07-16 | End: 2019-07-21 | Stop reason: HOSPADM

## 2019-07-16 RX ADMIN — ONDANSETRON 4 MG: 2 INJECTION INTRAMUSCULAR; INTRAVENOUS at 14:24

## 2019-07-16 RX ADMIN — PREDNISOLONE ACETATE 1 DROP: 10 SUSPENSION/ DROPS OPHTHALMIC at 21:04

## 2019-07-16 RX ADMIN — HYDROMORPHONE HYDROCHLORIDE 0.4 MG: 1 INJECTION, SOLUTION INTRAMUSCULAR; INTRAVENOUS; SUBCUTANEOUS at 16:07

## 2019-07-16 RX ADMIN — ONDANSETRON 4 MG: 2 INJECTION INTRAMUSCULAR; INTRAVENOUS at 20:54

## 2019-07-16 RX ADMIN — KETOROLAC TROMETHAMINE 1 DROP: 4 SOLUTION/ DROPS OPHTHALMIC at 14:18

## 2019-07-16 RX ADMIN — SENNOSIDES AND DOCUSATE SODIUM 2 TABLET: 8.6; 5 TABLET ORAL at 20:59

## 2019-07-16 RX ADMIN — KETOROLAC TROMETHAMINE 1 DROP: 4 SOLUTION/ DROPS OPHTHALMIC at 08:28

## 2019-07-16 RX ADMIN — KETOROLAC TROMETHAMINE 1 DROP: 4 SOLUTION/ DROPS OPHTHALMIC at 21:04

## 2019-07-16 RX ADMIN — KETOROLAC TROMETHAMINE 7.5 MG: 15 INJECTION, SOLUTION INTRAMUSCULAR; INTRAVENOUS at 22:42

## 2019-07-16 RX ADMIN — HYDROMORPHONE HYDROCHLORIDE 0.4 MG: 1 INJECTION, SOLUTION INTRAMUSCULAR; INTRAVENOUS; SUBCUTANEOUS at 00:03

## 2019-07-16 RX ADMIN — PROCHLORPERAZINE EDISYLATE 10 MG: 5 INJECTION INTRAMUSCULAR; INTRAVENOUS at 17:07

## 2019-07-16 RX ADMIN — ACETAMINOPHEN 650 MG: 325 TABLET, FILM COATED ORAL at 18:55

## 2019-07-16 RX ADMIN — LEVOFLOXACIN 500 MG: 5 INJECTION, SOLUTION INTRAVENOUS at 17:07

## 2019-07-16 RX ADMIN — HYDROMORPHONE HYDROCHLORIDE 4 MG: 2 TABLET ORAL at 14:17

## 2019-07-16 RX ADMIN — HYDROMORPHONE HYDROCHLORIDE 0.4 MG: 1 INJECTION, SOLUTION INTRAMUSCULAR; INTRAVENOUS; SUBCUTANEOUS at 10:58

## 2019-07-16 RX ADMIN — HYDROMORPHONE HYDROCHLORIDE 0.4 MG: 1 INJECTION, SOLUTION INTRAMUSCULAR; INTRAVENOUS; SUBCUTANEOUS at 08:25

## 2019-07-16 RX ADMIN — HYDROMORPHONE HYDROCHLORIDE 4 MG: 2 TABLET ORAL at 01:54

## 2019-07-16 RX ADMIN — HYDROMORPHONE HYDROCHLORIDE 0.4 MG: 1 INJECTION, SOLUTION INTRAMUSCULAR; INTRAVENOUS; SUBCUTANEOUS at 12:55

## 2019-07-16 RX ADMIN — PREDNISOLONE ACETATE 1 DROP: 10 SUSPENSION/ DROPS OPHTHALMIC at 14:18

## 2019-07-16 RX ADMIN — PREDNISOLONE ACETATE 1 DROP: 10 SUSPENSION/ DROPS OPHTHALMIC at 08:28

## 2019-07-16 RX ADMIN — HYDROMORPHONE HYDROCHLORIDE 0.4 MG: 1 INJECTION, SOLUTION INTRAMUSCULAR; INTRAVENOUS; SUBCUTANEOUS at 05:26

## 2019-07-16 RX ADMIN — CYCLOBENZAPRINE HYDROCHLORIDE 5 MG: 5 TABLET, FILM COATED ORAL at 16:07

## 2019-07-16 RX ADMIN — POTASSIUM CHLORIDE 20 MEQ: 29.8 INJECTION, SOLUTION INTRAVENOUS at 13:04

## 2019-07-16 RX ADMIN — HYDROMORPHONE HYDROCHLORIDE 4 MG: 2 TABLET ORAL at 09:49

## 2019-07-16 RX ADMIN — ACETAMINOPHEN 975 MG: 325 TABLET, FILM COATED ORAL at 05:27

## 2019-07-16 ASSESSMENT — MIFFLIN-ST. JEOR: SCORE: 1273.15

## 2019-07-16 ASSESSMENT — ACTIVITIES OF DAILY LIVING (ADL)
ADLS_ACUITY_SCORE: 15

## 2019-07-16 NOTE — PLAN OF CARE
"/78 (BP Location: Left leg)   Pulse 99   Temp 99.5  F (37.5  C) (Oral)   Resp 16   Ht 1.473 m (4' 10\")   Wt 76.3 kg (168 lb 4.8 oz)   SpO2 92%   BMI 35.17 kg/m       Vitals: Tmax 99.5, HR 99, BP high.   Neuro: A&O times 4. Whispers.   Cardiac: HTN, intermittently tachy.  Respiratory: 16- 18 & denies SOB.  GI/: Abd hypo/ faint bowel sounds, irregular countor- obese, Ileostomy with small stools, no flatus since this morning & Urostomy with adequate urine output. Labia swelling.  Diet/Appetite: Intermittent nausea- poor PO intake since this morning.   Skin: Midline incision with staples. Bruised. PICC site bruising.   Labs: K+ 3.5- replaced in 5A, recheck 7/17.   LDA: LEANDRO with sero sang drainage. PICC intact with bruised site.   Activity: Up with assist with walker & gait belt.   Pain: Abd cramps - IV dilaudid & PO dilaudid given. Flexeril given. Will continue with current pain med regime [ urology aware of above mentioned concerns.   Plan: Continue with current poc, encourage OOB, ambulation & notify team with new changes. Continue levofloxacin per order.     "

## 2019-07-16 NOTE — PLAN OF CARE
Pt AO. VSS on RA except HTN. Up with assist x1 + walker. Sat in recliner most of day. Showered during OT session. C/o abd pain, managing with IV and PO dilaudid throughout day. C/o increase Lt abd cramping pain, little relief with pain meds. Also decreased stool output today, ileotomy intact. Some nausea this afternoon. MD saw and assessed pt this afternoon. Urostomy with good UOP. LEANDRO intact, minimal drainage. Midline incision with staple intact, KYLE. Rt PICC. Potassium replaced per protocol.  at bedside. Transfer to . Belongings pack and sent to .

## 2019-07-16 NOTE — PLAN OF CARE
Discharge Planner OT   Patient plan for discharge: prefers to discharge home; contemplating rehab as an option   Current status: Pt continues to be limited by pain and anticipation of pain with activity. Pt needing encouragement to complete tasks to greatest level of independence. Pt max A for LB dressing and min A for UB dressing. Pt completed sit > stands from EOB with CGA however poor adherence to abdominal precautions. Pt CGA with use of GBs for toilet transfer and shower transfer. Pt completed full shower seated, CGA for all tasks aside from LB bathing.    Barriers to return to prior living situation: medical management, pain, needing increased assistance for ADLs and assistive device for mobility, stairs to bedroom/shower  Recommendations for discharge: TCU   Rationale for recommendations: Recommend continued OT intervention to address deficits in strength, endurance, and balance leading to decreased ADL I. Pt making slow,steady progress towards PLOF however continues pt present well below her baseline.

## 2019-07-16 NOTE — PROVIDER NOTIFICATION
"Vital signs:  Temp: 98.8  F (37.1  C) Temp src: Oral BP: 167/70 Pulse: 99 Heart Rate: 109 Resp: 16 SpO2: 95 % O2 Device: None (Room air) Oxygen Delivery: 2 LPM Height: 147.3 cm (4' 10\") Weight: 76.3 kg (168 lb 4.8 oz)  Estimated body mass index is 35.17 kg/m  as calculated from the following:    Height as of this encounter: 1.473 m (4' 10\").    Weight as of this encounter: 76.3 kg (168 lb 4.8 oz).        Persistent abdominal pain, nausea & poor oral intake since breakfast.   Requesting: IV Toradol once per Dania, K (per pt). Abd x- ray done- pending result.  Plan: Continue with current pain management, antiemetics & notify MD with any changes.     Pola Guadarrama RN   "

## 2019-07-16 NOTE — PROGRESS NOTES
Social Work Services Progress Note     Hospital Day: 7  Collaborated with:  Patient     Data:  Pt is a 49-year-old female admitted to [facility] 7/10/19.      Intervention:  Met with pt to see if decision made regarding TCU vs home. Pt states she would really like to go home, and feels that if her pain gets under control she will be fine to go home. Pt states she has discussed this with primary team as well.      Assessment:  TCU recommended; pt declining this     Plan:    Anticipated Disposition:  Home with services    Barriers to d/c plan:  Medical stability    Follow Up:  SW to follow and assist as needed     RACHEL Oakley, LGSW  5th Floor   Pager [phone]  Phone [phone]

## 2019-07-16 NOTE — PROGRESS NOTES
REGIONAL ANESTHESIA PAIN SERVICE FOLLOW UP NOTE  Karo Lindsay is a 49 year old female POD #5 s/p REVISION, URINARY CONDUIT and placement of Epidrual T7-8 catheter for pain management.     SUBJECTIVE  Interval History: Overnight no acute events.  Patient reports sitting up in recliner at bedside reports current pain rating after oral Dilaudid is 2/10, pain control adequate.  Denies weakness, paresthesias, circumoral numbness, metallic taste or tinnitus.  Ambulating with assistance.  Currently tolerating regular diet, denies nausea or vomiting.  Artis drainage bag to urostomy, clear gloria urine.  Plans are to remove epidural catheter today        Antithrombotic/Thrombolytic Therapy: None ordered                  Medications related to Pain Management (From now, onward)     Start     Dose/Rate Route Frequency Ordered Stop     07/14/19 0941   HYDROmorphone (PF) (DILAUDID) injection 0.2-0.4 mg      0.2-0.4 mg  over 2-10 Minutes Intravenous EVERY 2 HOURS PRN 07/14/19 0943       07/14/19 0941   HYDROmorphone (DILAUDID) tablet 2-4 mg      2-4 mg Oral EVERY 4 HOURS PRN 07/14/19 0943       07/12/19 1400   acetaminophen (TYLENOL) tablet 975 mg      975 mg Oral EVERY 8 HOURS 07/12/19 1028 07/17/19 1359     07/11/19 0800   senna-docusate (SENOKOT-S/PERICOLACE) 8.6-50 MG per tablet 1 tablet      1 tablet Oral 2 TIMES DAILY 07/11/19 0017       07/11/19 0800   senna-docusate (SENOKOT-S/PERICOLACE) 8.6-50 MG per tablet 2 tablet      2 tablet Oral 2 TIMES DAILY 07/11/19 0017       07/11/19 0017   lidocaine 1 % 0.1-1 mL      0.1-1 mL Other EVERY 1 HOUR PRN 07/11/19 0017       07/11/19 0017   lidocaine (LMX4) cream        Topical EVERY 1 HOUR PRN 07/11/19 0017       07/11/19 0017   acetaminophen (TYLENOL) tablet 650 mg      650 mg Oral EVERY 4 HOURS PRN 07/11/19 0017       07/10/19 1315   bupivacaine (MARCAINE) 0.125 % in sodium chloride 0.9 % 250 mL EPIDURAL Infusion      8 mL/hr  EPIDURAL CONTINUOUS 07/10/19 1315        "           OBJECTIVE  Lab Results         Recent Labs   Lab Test 07/15/19  0654   WBC 8.3   RBC 2.75*   HGB 7.9*   HCT 25.6*   MCV 93   MCH 28.7   MCHC 30.9*   RDW 13.4               Lab Results   Component Value Date     INR 1.0 04/24/2018         Vitals: Blood pressure 146/62, pulse 113, temperature 99.4  F (37.4  C), temperature source Oral, resp. rate 18, height 1.473 m (4' 10\"), weight 69.4 kg (153 lb), SpO2 95 %.     Exam:  Constitutional: alert and no distress  Neuro/MSK:  Strength BLE 5/5  and overall symmetric  Skin: epidural insertion site c/d/i, no tenderness, erythema, heme, edema.       ASSESSMENT  Karo Lindsay is a 49 year old female POD #5 s/p REVISION, URINARY CONDUIT and placement of Epidrual T7-8 catheter for pain management.     Patient currently achieving adequate pain control with epidural catheter/infusion and multimodal analgesia.  Patient is meeting activity goals. No weakness or paresthesias. No evidence of adverse side effects associated with local anesthetic.      PLAN  0845 epidural catheter removed without complication, dark tip intact.  Insertion site c/d/i. Small bandage applied  - Primary service and Bedside RN aware of plans.  Thank you for allowing us the opportunity to participate in the care of Karo Lindsay  - will sign off     Melvin Rick IV, MD  [facility]    "

## 2019-07-16 NOTE — PLAN OF CARE
Alert and oriented x 4. On room air while awake. Noted 02 in the mid 80's while asleep, started on 02 inhalation at 2 lpm via nasal cannula overnight.  Complains of abdominal pain with partial relief from prn oral and IV meds. Walked in the hallway with  and walker. Band aid on the back from epidural site is clean, dry and intact. Abdominal incision approximated with staples, no drainage noted. Urostomy bag is intact with pale yellow urine. Colostomy bag is intact with liquid stool. Abdominal drain to bulb suction has small amount of serosanguinous drainage. Slept at intervals . Vital signs stable. Will continue to monitor and follow plan of care

## 2019-07-16 NOTE — PROGRESS NOTES
WOC Nurse Inpatient [facility]   WO Nurse Inpatient Adult     Initial Assessment   Assessment of revised ileal conduit Stoma complications: none  Mucocutaneous junction Ileal conduit: intact   Peristomal complication(s) ileal conduit: none  Pouch wear time:3-4 days,   Following today's visit:Patient is  able to demonstrate; patient has had ileal conduit and ileostomy since birth      1. How to empty their pouch? yes      2. How to change their pouch?  yes      3. How to read and record intake and output correctly? Yes    Assessment of established end Ileostomy Stoma   complication(s) prolapse   MCJ: intact  Peristomal complications: erosions to peristomal skin, per patient have always been there, has had issues with leakage off and on throughout her life.     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  Current Diet/Nutrition: Orders Placed This Encounter      Regular Diet Adult     TPN no   I/O last 3 completed shifts:  In: 40 [I.V.:40]  Out: 3270 [Urine:2800; Drains:70; Stool:400]  Labs:    Recent Labs   Lab 07/16/19  0659   HGB 8.2*   WBC 7.3        Physical Exam:  Ileal Conduit s/p revision on 7/10/19  Current pouching system:[place]ath[place] two piece flat,  Reason for pouch change today: pouch not changed today  Stoma appearance: viable, healthy, edematous  Stoma size; 38mm, one stent in place  Peristomal skin: intact  Stoma output :clear and yellow   Abdominal  Assessment  soft , NG still in place? No  Surgical Site: staples intact  Pain: Cramping and Sharp  Is patient still on a PCA no    Ileostomy, established   Current pouching system:[place] soft convex fecal pouch cut to 30mm, andreea ring x 3, and coloplast protective sheet. Powder and barrier film applied to peristomal skin as well.   Reason for pouch change  today: not changed today   Stoma appearance: viable, healthy, normal-appearing and prolapsed  Stoma size; pouch cut to 30mm, stomal base is larger however.   Peristomal skin: chronic erosions  Stoma output :brown, paste like in consistency     Interventions:  Patient's chart evaluated.  Focus of today's visit: verbal instruction  and diet and hydration    Participant of teaching session today patient  and spouse  Change made with ostomy management today: No  Patient/family: lethargic and observing  Supplies:Ordered soft convex one piece fecal pouch, andreea ring and protective sheet. (for ileostomy)    Plan:  Learning needs: completed  Preparation for discharge: No discharge preparations started  Recommend home care? yes and by home WOC nurse if possible, patient requesting some assistance after discharge, spoke with CCRN about this today.     Discussed plan of care with Patient and Nurse  Nursing to notify the Provider(s) and re-consult the WOC Nurse if new ostomy concerns or discharge planned before next planned WOC visit.    WOC Nurse will return: Friday  Face to face time: 15 minutes    Vonda Cameron

## 2019-07-16 NOTE — PLAN OF CARE
PT: pt cx PT session today due to increased pain. Pt transfer up to 7B this PM. PT with cont to progress with POC.

## 2019-07-17 ENCOUNTER — APPOINTMENT (OUTPATIENT)
Dept: PHYSICAL THERAPY | Facility: CLINIC | Age: 49
End: 2019-07-17
Attending: UROLOGY
Payer: COMMERCIAL

## 2019-07-17 ENCOUNTER — APPOINTMENT (OUTPATIENT)
Dept: GENERAL RADIOLOGY | Facility: CLINIC | Age: 49
End: 2019-07-17
Attending: STUDENT IN AN ORGANIZED HEALTH CARE EDUCATION/TRAINING PROGRAM
Payer: COMMERCIAL

## 2019-07-17 ENCOUNTER — APPOINTMENT (OUTPATIENT)
Dept: CT IMAGING | Facility: CLINIC | Age: 49
End: 2019-07-17
Attending: UROLOGY
Payer: COMMERCIAL

## 2019-07-17 LAB
ANION GAP SERPL CALCULATED.3IONS-SCNC: 7 MMOL/L (ref 3–14)
BACTERIA SPEC CULT: NO GROWTH
BACTERIA SPEC CULT: NO GROWTH
BUN SERPL-MCNC: 8 MG/DL (ref 7–30)
CALCIUM SERPL-MCNC: 8.7 MG/DL (ref 8.5–10.1)
CHLORIDE SERPL-SCNC: 98 MMOL/L (ref 94–109)
CO2 SERPL-SCNC: 32 MMOL/L (ref 20–32)
CREAT SERPL-MCNC: 0.82 MG/DL (ref 0.52–1.04)
ERYTHROCYTE [DISTWIDTH] IN BLOOD BY AUTOMATED COUNT: 13.2 % (ref 10–15)
GFR SERPL CREATININE-BSD FRML MDRD: 83 ML/MIN/{1.73_M2}
GLUCOSE SERPL-MCNC: 132 MG/DL (ref 70–99)
HCT VFR BLD AUTO: 29.3 % (ref 35–47)
HGB BLD-MCNC: 9.2 G/DL (ref 11.7–15.7)
Lab: NORMAL
Lab: NORMAL
MAGNESIUM SERPL-MCNC: 1.6 MG/DL (ref 1.6–2.3)
MCH RBC QN AUTO: 28.1 PG (ref 26.5–33)
MCHC RBC AUTO-ENTMCNC: 31.4 G/DL (ref 31.5–36.5)
MCV RBC AUTO: 90 FL (ref 78–100)
PHOSPHATE SERPL-MCNC: 4.6 MG/DL (ref 2.5–4.5)
PLATELET # BLD AUTO: 332 10E9/L (ref 150–450)
POTASSIUM SERPL-SCNC: 3.3 MMOL/L (ref 3.4–5.3)
POTASSIUM SERPL-SCNC: 3.4 MMOL/L (ref 3.4–5.3)
RBC # BLD AUTO: 3.27 10E12/L (ref 3.8–5.2)
SODIUM SERPL-SCNC: 136 MMOL/L (ref 133–144)
SPECIMEN SOURCE: NORMAL
SPECIMEN SOURCE: NORMAL
WBC # BLD AUTO: 8.8 10E9/L (ref 4–11)

## 2019-07-17 PROCEDURE — 25000128 H RX IP 250 OP 636: Performed by: PHYSICIAN ASSISTANT

## 2019-07-17 PROCEDURE — 83735 ASSAY OF MAGNESIUM: CPT | Performed by: STUDENT IN AN ORGANIZED HEALTH CARE EDUCATION/TRAINING PROGRAM

## 2019-07-17 PROCEDURE — 25000132 ZZH RX MED GY IP 250 OP 250 PS 637: Performed by: STUDENT IN AN ORGANIZED HEALTH CARE EDUCATION/TRAINING PROGRAM

## 2019-07-17 PROCEDURE — 97116 GAIT TRAINING THERAPY: CPT | Mod: GP | Performed by: REHABILITATION PRACTITIONER

## 2019-07-17 PROCEDURE — 12000001 ZZH R&B MED SURG/OB UMMC

## 2019-07-17 PROCEDURE — 36592 COLLECT BLOOD FROM PICC: CPT | Performed by: STUDENT IN AN ORGANIZED HEALTH CARE EDUCATION/TRAINING PROGRAM

## 2019-07-17 PROCEDURE — 25000125 ZZHC RX 250: Performed by: PHYSICIAN ASSISTANT

## 2019-07-17 PROCEDURE — 25000128 H RX IP 250 OP 636: Performed by: RADIOLOGY

## 2019-07-17 PROCEDURE — 80048 BASIC METABOLIC PNL TOTAL CA: CPT | Performed by: STUDENT IN AN ORGANIZED HEALTH CARE EDUCATION/TRAINING PROGRAM

## 2019-07-17 PROCEDURE — 25000128 H RX IP 250 OP 636: Performed by: STUDENT IN AN ORGANIZED HEALTH CARE EDUCATION/TRAINING PROGRAM

## 2019-07-17 PROCEDURE — 84100 ASSAY OF PHOSPHORUS: CPT | Performed by: STUDENT IN AN ORGANIZED HEALTH CARE EDUCATION/TRAINING PROGRAM

## 2019-07-17 PROCEDURE — 25800030 ZZH RX IP 258 OP 636: Performed by: STUDENT IN AN ORGANIZED HEALTH CARE EDUCATION/TRAINING PROGRAM

## 2019-07-17 PROCEDURE — 74018 RADEX ABDOMEN 1 VIEW: CPT

## 2019-07-17 PROCEDURE — 36592 COLLECT BLOOD FROM PICC: CPT | Performed by: PHYSICIAN ASSISTANT

## 2019-07-17 PROCEDURE — 85027 COMPLETE CBC AUTOMATED: CPT | Performed by: PHYSICIAN ASSISTANT

## 2019-07-17 PROCEDURE — 84132 ASSAY OF SERUM POTASSIUM: CPT | Performed by: UROLOGY

## 2019-07-17 PROCEDURE — 36592 COLLECT BLOOD FROM PICC: CPT | Performed by: UROLOGY

## 2019-07-17 PROCEDURE — 97530 THERAPEUTIC ACTIVITIES: CPT | Mod: GP | Performed by: REHABILITATION PRACTITIONER

## 2019-07-17 PROCEDURE — 97110 THERAPEUTIC EXERCISES: CPT | Mod: GP | Performed by: REHABILITATION PRACTITIONER

## 2019-07-17 PROCEDURE — 74177 CT ABD & PELVIS W/CONTRAST: CPT

## 2019-07-17 RX ORDER — SODIUM CHLORIDE 9 MG/ML
INJECTION, SOLUTION INTRAVENOUS CONTINUOUS
Status: DISCONTINUED | OUTPATIENT
Start: 2019-07-17 | End: 2019-07-21

## 2019-07-17 RX ORDER — IOPAMIDOL 755 MG/ML
103 INJECTION, SOLUTION INTRAVASCULAR ONCE
Status: COMPLETED | OUTPATIENT
Start: 2019-07-17 | End: 2019-07-17

## 2019-07-17 RX ADMIN — KETOROLAC TROMETHAMINE 1 DROP: 4 SOLUTION/ DROPS OPHTHALMIC at 19:37

## 2019-07-17 RX ADMIN — SODIUM CHLORIDE, PRESERVATIVE FREE: 5 INJECTION INTRAVENOUS at 06:53

## 2019-07-17 RX ADMIN — POTASSIUM CHLORIDE 20 MEQ: 29.8 INJECTION, SOLUTION INTRAVENOUS at 07:45

## 2019-07-17 RX ADMIN — PROCHLORPERAZINE EDISYLATE 10 MG: 5 INJECTION INTRAMUSCULAR; INTRAVENOUS at 18:54

## 2019-07-17 RX ADMIN — HYDROMORPHONE HYDROCHLORIDE 2 MG: 2 TABLET ORAL at 17:40

## 2019-07-17 RX ADMIN — ACETAMINOPHEN 650 MG: 325 TABLET, FILM COATED ORAL at 14:11

## 2019-07-17 RX ADMIN — HYDROMORPHONE HYDROCHLORIDE 0.4 MG: 1 INJECTION, SOLUTION INTRAMUSCULAR; INTRAVENOUS; SUBCUTANEOUS at 14:11

## 2019-07-17 RX ADMIN — ONDANSETRON 4 MG: 2 INJECTION INTRAMUSCULAR; INTRAVENOUS at 16:38

## 2019-07-17 RX ADMIN — ONDANSETRON 4 MG: 2 INJECTION INTRAMUSCULAR; INTRAVENOUS at 11:47

## 2019-07-17 RX ADMIN — HYDROMORPHONE HYDROCHLORIDE 0.4 MG: 1 INJECTION, SOLUTION INTRAMUSCULAR; INTRAVENOUS; SUBCUTANEOUS at 09:54

## 2019-07-17 RX ADMIN — HYDROMORPHONE HYDROCHLORIDE 0.4 MG: 1 INJECTION, SOLUTION INTRAMUSCULAR; INTRAVENOUS; SUBCUTANEOUS at 01:55

## 2019-07-17 RX ADMIN — HYDROMORPHONE HYDROCHLORIDE 0.4 MG: 1 INJECTION, SOLUTION INTRAMUSCULAR; INTRAVENOUS; SUBCUTANEOUS at 16:28

## 2019-07-17 RX ADMIN — POTASSIUM CHLORIDE 20 MEQ: 29.8 INJECTION, SOLUTION INTRAVENOUS at 09:54

## 2019-07-17 RX ADMIN — HYDROMORPHONE HYDROCHLORIDE 0.4 MG: 1 INJECTION, SOLUTION INTRAMUSCULAR; INTRAVENOUS; SUBCUTANEOUS at 07:45

## 2019-07-17 RX ADMIN — Medication 2 G: at 11:47

## 2019-07-17 RX ADMIN — ACETAMINOPHEN 650 MG: 325 TABLET, FILM COATED ORAL at 00:10

## 2019-07-17 RX ADMIN — HYDROMORPHONE HYDROCHLORIDE 0.4 MG: 1 INJECTION, SOLUTION INTRAMUSCULAR; INTRAVENOUS; SUBCUTANEOUS at 19:37

## 2019-07-17 RX ADMIN — HYDROMORPHONE HYDROCHLORIDE 4 MG: 2 TABLET ORAL at 23:02

## 2019-07-17 RX ADMIN — ONDANSETRON 4 MG: 2 INJECTION INTRAMUSCULAR; INTRAVENOUS at 05:08

## 2019-07-17 RX ADMIN — PREDNISOLONE ACETATE 1 DROP: 10 SUSPENSION/ DROPS OPHTHALMIC at 14:14

## 2019-07-17 RX ADMIN — IOPAMIDOL 103 ML: 755 INJECTION, SOLUTION INTRAVENOUS at 14:30

## 2019-07-17 RX ADMIN — PREDNISOLONE ACETATE 1 DROP: 10 SUSPENSION/ DROPS OPHTHALMIC at 07:45

## 2019-07-17 RX ADMIN — LEVOFLOXACIN 500 MG: 5 INJECTION, SOLUTION INTRAVENOUS at 17:40

## 2019-07-17 RX ADMIN — KETOROLAC TROMETHAMINE 1 DROP: 4 SOLUTION/ DROPS OPHTHALMIC at 14:15

## 2019-07-17 RX ADMIN — HYDROMORPHONE HYDROCHLORIDE 0.4 MG: 1 INJECTION, SOLUTION INTRAMUSCULAR; INTRAVENOUS; SUBCUTANEOUS at 22:16

## 2019-07-17 RX ADMIN — SENNOSIDES AND DOCUSATE SODIUM 1 TABLET: 8.6; 5 TABLET ORAL at 20:57

## 2019-07-17 RX ADMIN — PROCHLORPERAZINE EDISYLATE 10 MG: 5 INJECTION INTRAMUSCULAR; INTRAVENOUS at 09:19

## 2019-07-17 RX ADMIN — PREDNISOLONE ACETATE 1 DROP: 10 SUSPENSION/ DROPS OPHTHALMIC at 19:37

## 2019-07-17 RX ADMIN — PROCHLORPERAZINE EDISYLATE 10 MG: 5 INJECTION INTRAMUSCULAR; INTRAVENOUS at 00:10

## 2019-07-17 RX ADMIN — ACETAMINOPHEN 650 MG: 325 TABLET, FILM COATED ORAL at 09:25

## 2019-07-17 RX ADMIN — ONDANSETRON 4 MG: 2 INJECTION INTRAMUSCULAR; INTRAVENOUS at 22:47

## 2019-07-17 RX ADMIN — ONDANSETRON 4 MG: 2 INJECTION INTRAMUSCULAR; INTRAVENOUS at 02:33

## 2019-07-17 RX ADMIN — KETOROLAC TROMETHAMINE 1 DROP: 4 SOLUTION/ DROPS OPHTHALMIC at 07:45

## 2019-07-17 RX ADMIN — SODIUM CHLORIDE, PRESERVATIVE FREE: 5 INJECTION INTRAVENOUS at 22:15

## 2019-07-17 ASSESSMENT — ACTIVITIES OF DAILY LIVING (ADL)
ADLS_ACUITY_SCORE: 15
ADLS_ACUITY_SCORE: 15
ADLS_ACUITY_SCORE: 14
ADLS_ACUITY_SCORE: 14
ADLS_ACUITY_SCORE: 15
ADLS_ACUITY_SCORE: 15

## 2019-07-17 NOTE — PROGRESS NOTES
Care Coordinator Progress Note    Admission Date/Time:  7/10/2019  Attending MD:  Akhil Causey MD    Data  Chart reviewed, discussed with interdisciplinary team.   Patient was admitted for: S/P ileal conduit (H).    Concerns with insurance coverage for discharge needs: None.  Current Living Situation: Patient lives with spouse.  Support System: Supportive and Involved  Services Involved: DME, [employer] for ostomy supplies  Transportation at Discharge: Family or friend will provide  Transportation to Medical Appointments:   - Name of caregiver: spouseEdi  Barriers to Discharge: medical clearance    Coordination of Care and Referrals: Provided patient/family with options for Home Care.        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  WOC RN is following patient to assist with stoma cares and is recommending home nursing visits with WOC RN nurse at discharge.  Met with patient at bedside to introduce RNCC role and discuss discharge planning.  Patient lives with her spouse in [place], [place] who can assist her as needed after discharge.  Discussed home care f/u and patient is interested in having RN and WOC RN visits arranged.  After giving choice of agency patient did not have a preference of agency so referral was made to [facility](P: [phone], F: [phone]), referral made and orders placed.  RNCC to continue to follow and assist with discharge planning as needed.         Plan  Anticipated Discharge Date:  2-3 days  Anticipated Discharge Plan:  Home with [facility] for RN/WOC RN visits       GRANT Ceja  Phone: [phone]  Pager: [phone]  To contact weekend RNCC, dial [phone] and enter pager number [phone] at prompt. This pager can not be contacted by text page or outside line.

## 2019-07-17 NOTE — PLAN OF CARE
"/78 (BP Location: Left leg)   Pulse 93   Temp 97  F (36.1  C) (Oral)   Resp 16   Ht 1.473 m (4' 10\")   Wt 76.3 kg (168 lb 4.8 oz)   SpO2 92%   BMI 35.17 kg/m        Hypertensive, other VSS. Nausea intermittently- worse after oral contrast, zofran & compazine given. IV dilaudid & tylenol for pain. Faint BS, no BM in ileostomy, urostomy with good urine output & LEANDRO with small bloody drainage. SBA, ambulating hallways & working with PT. Downstairs to CT- pelvis/ abd for further emesis overnight. Continue pain management, nausea & POC. Will notify with any changes.     Labs: K+ 3.3 & Mag+ 1.6- replaced per protocol.     Pola Guadarrama RN   "

## 2019-07-17 NOTE — PLAN OF CARE
Discharge Planner PT   Patient plan for discharge: home   Current status: demo sit to stand to WW with SBA. Pt amb up to 50'x 2 and 25'x 2 with PT with WW SBA. Pt amb is slow but stable. Pt amb with  few time around 7th floor. Pt demo up and down 3 steps with SBA. And ED on tech for car transfer. Pt limited by pain.   Barriers to return to prior living situation: pain fatigue.   Recommendations for discharge: home with assist, may needing WW at discharge   Rationale for recommendations: pt cont to progress but slow due to pain. Good support at home. May needing WW for home.        Entered by: Eduin Burton 07/17/2019 10:38 AM

## 2019-07-17 NOTE — PROGRESS NOTES
"I saw and evaluated the patient and agree with the resident note and plan of care. Obtained CTAP today for abdominal pain, emesis, and low output from ileostomy. CT shows ileus with some thickening of the bowel wall near what appears to be her small bowel anastomosis consistent with ileitis. Will leave NPO with IV fluids for now until it resolves. Continue ambulation. Try to limit narcotics.     Adriana Najera MD  Reconstructive Urology Fellow  --------------------------------------------------------------------------------------------------------------------------------------      Urology  Progress Note    - afebrile, hypertensive overnight  - vomited x2 around midnight, nursing staff noticed decreased ostomy output. KUB ordered, non-specific GI findings.  - pain is better    Exam  /83 (BP Location: Left leg)   Pulse 99   Temp 99  F (37.2  C) (Oral)   Resp 18   Ht 1.473 m (4' 10\")   Wt 76.3 kg (168 lb 4.8 oz)   SpO2 94%   BMI 35.17 kg/m    No acute distress  Unlabored breathing  Abdomen soft, non-distended, appropriately tender to palpation. L abdominal ileostomy w/  brown liquid and gas. Laparotomy incision C/D/I with staples, no clear evidence of fluctuance/drainage   R abdominal urostomy with dark UOP, mucous in bag, stoma pink and viable with single stent. Yellow urine in bag.   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     Neuro: tylenol, dilaudid IV and PO PRN.  CV: HDS. HR normalizing. LE US 7/14 negative for DVT.   Pulm: incentive spirometry while awake  FEN/GI: NPO, restart MIVF at 100/hr. Repeat KUB this morning.   Endo: DIRK "   : monitor urine output.  LEANDRO Cr WNL. Will hold on stent and LEANDRO removal today pending resolution of NV.  Heme/ID: Hgb stable. Afebrile x24 hours. F/u cultures. Levaquin added 7/14.   Activity: Up with assist. Encourage ambulation.   PPx: SCDs  Dispo: pending. WOCRN to assist with stoma cares. TCU is recommended per PT/OT    Seen and evaluated with chief resident, will discuss with Dr. Causey.    Fiordaliza Otero MD  Urology PGY-3           Contacting the Urology Team     Please use the following job codes to reach the Urology Team. Note that you must use an in house phone and that job codes cannot receive text pages.     On weekdays, dial [phone] (or star-star-star [phone] on the new [employer] telephones) then [phone] to reach the Adult Urology resident or PA on call    On weekdays, dial [phone] (or star-star-star [phone] on the new [employer] telephones) then 0818 to reach the Pediatric Urology resident    On weeknights and weekends, dial [phone] (or star-star-star [phone] on the new [employer] telephones) then [phone] to reach the Urology resident on call (for both Adult and Pediatrics)

## 2019-07-17 NOTE — PLAN OF CARE
[id number]:     Vitals: VSS ex BP on the higher side and tachy  Neuro: A&O times 4. Whispers.   Cardiac: HTN, intermittently tachy.  Respiratory: 16- 18 & denies SOB. On RA  GI/: Abd hypo/ faint bowel sounds, irregular countor- obese, Ileostomy with small stools, no flatus & Urostomy with adequate urine output. Labia swelling.  Diet/Appetite: Intermittent nausea- poor PO intake. 2 Emesis- gave compazine and Zofran x2- second dose of Zofran given 1 hour early bc emesis and no return page/call from urologist  Skin: Midline incision with staples. Bruised. PICC site bruising.    LDA: LEANDRO with sero sang drainage. PICC intact with bruised site.   Activity: Up with assist with walker & gait belt.   Pain: Abd cramps - IV dilaudid & tylenol given. Will continue with current pain med regime.,  Plan: Continue with current poc, encourage OOB, ambulation & notify team with new changes.

## 2019-07-17 NOTE — PROGRESS NOTES
"CLINICAL NUTRITION SERVICES - ASSESSMENT NOTE     Nutrition Prescription    RECOMMENDATIONS FOR MDs/PROVIDERS TO ORDER:  Diet adv (past clear liquids) v nutrition support within 24 - 48 hours.      Malnutrition Status:    Non-severe malnutrition in the context of acute illness.     Recommendations already ordered by Registered Dietitian (RD):  None while patient NPO.     Future/Additional Recommendations:  1. Pending CT scan results, if TPN becomes part of the nutrition POC, recommend as follows:   --Use dosing weight 50 kg  --Begin CPN, goal volume 71681 ml/day with initial 105 g Dex daily (357 kcal, GIR ~1.5), 85 g AA daily (340 kcal), and 250 ml 20% IV lipids 5x/week.  Micro/Rx: Infuvite or per PharmD  --ONLY once pt receives ~100% of initial continuous PN volume with K+/Mg++/Phos WNL, advance PN dex by 45 g every 1-2 days (pending lytes/Glu and Pharm D/RD discretion) to goal of 150 g Dex (510 kcal) to increase provisions to 1207 kcals (24 kcal/kg/day), 1.7 g PRO/kg/day, GIR ~2.1 with 29% kcals from Fat.    2. Once diet adv > FLD, offer snacks/supplements pending patient's preference.      REASON FOR ASSESSMENT  Karo Lindsay is a/an 49 year old female assessed by the dietitian for [facility]    NUTRITION HISTORY  Information obtained from patient & family member:   - Expressed having little to no appetite at this time.   - PTA, pt reports eating well (100% of 3 meals/day) with a good appetite.   - Family member reporting that patient consumes a lot of vegetables and a well-balanced diet at home. States that the patient tends to avoid gluten due to \"joint inflammation.\"       CURRENT NUTRITION ORDERS  Diet: NPO  Prior Diet: NPO (7/10-7/11); CLD (7/12); FLD (7/13); Regulars (7/14-7/17)    Intake/Tolerance: No intakes recorded in RN flowsheet since admit. Per bedside RN note on 7/17: \"Intermittent nausea- poor PO intake. 2 Emesis- gave compazine and Zofran x2- second dose of Zofran given 1 hour early bc emesis\"     Per " "Health Touch review (-), meal order average providing 770 kcal (15 kcal/kg) and 32 g PRO (0.64 g/kg). *Assuming 100% of meals consumed.     LABS  Lytes: K+: 3.3 (L), ()- Phos: 2.3 (L) and M.6 (WNL)      MEDICATIONS  Bowel regimen   IVF @ 100 ml/hr (2400 ml fluid)     ANTHROPOMETRICS  Height: 147.3 cm (4' 10\")  Most Recent Weight: 76.3 kg (168 lb 4.8 oz); 69.4 kg (153 lbs) on 7/10--suspect actual BW    IBW: 43 kg  BMI: Obesity Grade II BMI 35-39.9  Weight History: 2.6% weight loss in 3 months, suspect fluid related, at least in part.   Wt Readings from Last 15 Encounters:   19 70.6 kg (155 lb 9.6 oz)   19 71.2 kg (157 lb)   04/15/19 71.3 kg (157 lb 1.6 oz)       Dosing Weight: 50 kg (adjusted based on IBW of 43 kg and dry wt of 69 kg on 7/10)     ASSESSED NUTRITION NEEDS  Estimated Energy Needs: 1000 - 1250 kcals/day (20 - 25+ kcals/kg)  Justification: Obese; aim higher end for PO   Estimated Protein Needs: 75 - 100 grams protein/day (1.5 - 2 grams of pro/kg)  Justification: Obesity guidelines  Estimated Fluid Needs: 1 mL/kcal   Justification: Maintenance and Per provider pending fluid status    PHYSICAL FINDINGS  See malnutrition section below.    MALNUTRITION  % Intake: </= 50% for >/= 5 days (severe)  % Weight Loss: Up to 7.5% in 3 months (non-severe)  Subcutaneous Fat Loss: None observed  Muscle Loss: None observed  Fluid Accumulation/Edema: Does not meet criteria (trace)  Malnutrition Diagnosis: Non-severe malnutrition in the context of acute illness.     NUTRITION DIAGNOSIS  Inadequate oral intake related to inconsistent diet status & lack of appetite as evidenced by slow diet advancement (NPO/CLD x 3 days) and per Health Touch review (-), meal order average providing 770 kcal (15 kcal/kg) and 32 g PRO (0.64 g/kg).      INTERVENTIONS  Implementation  Nutrition Education: Provided education on nutrition POC and role of RD.    Collaboration with other providers: spoke with team " PAYusufC regarding nutrition POC: awaiting CT scan results to determine diet adv vs need for TPN.   Parenteral Nutrition/IV Fluids - Recommendations provided above      Goals  Diet adv (past clear liquids) v nutrition support within 24 - 48 hours.      Monitoring/Evaluation  Progress toward goals will be monitored and evaluated per protocol.      Tiffany Diaz RD, LD   5A [phone])/7B floor pager [phone]

## 2019-07-17 NOTE — PLAN OF CARE
Pt admitted with urostomy reconstruction. VSS on RA. A&Ox4. SBA. NPO. Complaints of nausea, gave Zofran x1 and Compazine x1. Pt reports incisional pain, gave IV Dilaudid and PO with some relief. Urostomy intact with adequate output. Colostomy intact, with minimal output. Laith drain in place. Plan to discharge once medically stable. Continue to monitor and follow the POC.

## 2019-07-18 ENCOUNTER — APPOINTMENT (OUTPATIENT)
Dept: OCCUPATIONAL THERAPY | Facility: CLINIC | Age: 49
End: 2019-07-18
Attending: UROLOGY
Payer: COMMERCIAL

## 2019-07-18 ENCOUNTER — APPOINTMENT (OUTPATIENT)
Dept: PHYSICAL THERAPY | Facility: CLINIC | Age: 49
End: 2019-07-18
Attending: UROLOGY
Payer: COMMERCIAL

## 2019-07-18 LAB
ALBUMIN SERPL-MCNC: 2.2 G/DL (ref 3.4–5)
ALP SERPL-CCNC: 54 U/L (ref 40–150)
ALT SERPL W P-5'-P-CCNC: 19 U/L (ref 0–50)
ANION GAP SERPL CALCULATED.3IONS-SCNC: 6 MMOL/L (ref 3–14)
AST SERPL W P-5'-P-CCNC: 14 U/L (ref 0–45)
BILIRUB DIRECT SERPL-MCNC: <0.1 MG/DL (ref 0–0.2)
BILIRUB SERPL-MCNC: 0.2 MG/DL (ref 0.2–1.3)
BUN SERPL-MCNC: 9 MG/DL (ref 7–30)
CALCIUM SERPL-MCNC: 8.3 MG/DL (ref 8.5–10.1)
CHLORIDE SERPL-SCNC: 99 MMOL/L (ref 94–109)
CO2 SERPL-SCNC: 32 MMOL/L (ref 20–32)
CREAT SERPL-MCNC: 0.85 MG/DL (ref 0.52–1.04)
ERYTHROCYTE [DISTWIDTH] IN BLOOD BY AUTOMATED COUNT: 13.2 % (ref 10–15)
GFR SERPL CREATININE-BSD FRML MDRD: 79 ML/MIN/{1.73_M2}
GLUCOSE BLDC GLUCOMTR-MCNC: 104 MG/DL (ref 70–99)
GLUCOSE SERPL-MCNC: 129 MG/DL (ref 70–99)
HCT VFR BLD AUTO: 31.2 % (ref 35–47)
HGB BLD-MCNC: 9.7 G/DL (ref 11.7–15.7)
Lab: NORMAL
MAGNESIUM SERPL-MCNC: 1.9 MG/DL (ref 1.6–2.3)
MCH RBC QN AUTO: 28 PG (ref 26.5–33)
MCHC RBC AUTO-ENTMCNC: 31.1 G/DL (ref 31.5–36.5)
MCV RBC AUTO: 90 FL (ref 78–100)
PHOSPHATE SERPL-MCNC: 3.8 MG/DL (ref 2.5–4.5)
PLATELET # BLD AUTO: 385 10E9/L (ref 150–450)
POTASSIUM SERPL-SCNC: 3.4 MMOL/L (ref 3.4–5.3)
PROT SERPL-MCNC: 6.3 G/DL (ref 6.8–8.8)
RBC # BLD AUTO: 3.46 10E12/L (ref 3.8–5.2)
SODIUM SERPL-SCNC: 138 MMOL/L (ref 133–144)
SPECIMEN SOURCE: NORMAL
SPECIMEN SOURCE: NORMAL
TRIGL SERPL-MCNC: 178 MG/DL
WBC # BLD AUTO: 9.8 10E9/L (ref 4–11)
YEAST SPEC QL CULT: NORMAL
YEAST SPEC QL CULT: NORMAL

## 2019-07-18 PROCEDURE — 25000128 H RX IP 250 OP 636: Performed by: PHYSICIAN ASSISTANT

## 2019-07-18 PROCEDURE — 25000128 H RX IP 250 OP 636: Performed by: STUDENT IN AN ORGANIZED HEALTH CARE EDUCATION/TRAINING PROGRAM

## 2019-07-18 PROCEDURE — 83735 ASSAY OF MAGNESIUM: CPT | Performed by: PHYSICIAN ASSISTANT

## 2019-07-18 PROCEDURE — 12000001 ZZH R&B MED SURG/OB UMMC

## 2019-07-18 PROCEDURE — 25000132 ZZH RX MED GY IP 250 OP 250 PS 637: Performed by: STUDENT IN AN ORGANIZED HEALTH CARE EDUCATION/TRAINING PROGRAM

## 2019-07-18 PROCEDURE — 3E0436Z INTRODUCTION OF NUTRITIONAL SUBSTANCE INTO CENTRAL VEIN, PERCUTANEOUS APPROACH: ICD-10-PCS | Performed by: UROLOGY

## 2019-07-18 PROCEDURE — 84478 ASSAY OF TRIGLYCERIDES: CPT | Performed by: STUDENT IN AN ORGANIZED HEALTH CARE EDUCATION/TRAINING PROGRAM

## 2019-07-18 PROCEDURE — 00000146 ZZHCL STATISTIC GLUCOSE BY METER IP

## 2019-07-18 PROCEDURE — 97535 SELF CARE MNGMENT TRAINING: CPT | Mod: GO

## 2019-07-18 PROCEDURE — 25800030 ZZH RX IP 258 OP 636: Performed by: PHYSICIAN ASSISTANT

## 2019-07-18 PROCEDURE — 25000125 ZZHC RX 250: Performed by: UROLOGY

## 2019-07-18 PROCEDURE — 85027 COMPLETE CBC AUTOMATED: CPT | Performed by: STUDENT IN AN ORGANIZED HEALTH CARE EDUCATION/TRAINING PROGRAM

## 2019-07-18 PROCEDURE — 25800030 ZZH RX IP 258 OP 636: Performed by: STUDENT IN AN ORGANIZED HEALTH CARE EDUCATION/TRAINING PROGRAM

## 2019-07-18 PROCEDURE — 36592 COLLECT BLOOD FROM PICC: CPT | Performed by: STUDENT IN AN ORGANIZED HEALTH CARE EDUCATION/TRAINING PROGRAM

## 2019-07-18 PROCEDURE — 83735 ASSAY OF MAGNESIUM: CPT | Performed by: STUDENT IN AN ORGANIZED HEALTH CARE EDUCATION/TRAINING PROGRAM

## 2019-07-18 PROCEDURE — 80076 HEPATIC FUNCTION PANEL: CPT | Performed by: STUDENT IN AN ORGANIZED HEALTH CARE EDUCATION/TRAINING PROGRAM

## 2019-07-18 PROCEDURE — 97116 GAIT TRAINING THERAPY: CPT | Mod: GP

## 2019-07-18 PROCEDURE — 84100 ASSAY OF PHOSPHORUS: CPT | Performed by: STUDENT IN AN ORGANIZED HEALTH CARE EDUCATION/TRAINING PROGRAM

## 2019-07-18 PROCEDURE — 80048 BASIC METABOLIC PNL TOTAL CA: CPT | Performed by: STUDENT IN AN ORGANIZED HEALTH CARE EDUCATION/TRAINING PROGRAM

## 2019-07-18 PROCEDURE — 97530 THERAPEUTIC ACTIVITIES: CPT | Mod: GO

## 2019-07-18 RX ORDER — HEPARIN SODIUM,PORCINE 10 UNIT/ML
2-5 VIAL (ML) INTRAVENOUS
Status: DISCONTINUED | OUTPATIENT
Start: 2019-07-18 | End: 2019-07-21 | Stop reason: HOSPADM

## 2019-07-18 RX ORDER — LIDOCAINE 40 MG/G
CREAM TOPICAL
Status: DISCONTINUED | OUTPATIENT
Start: 2019-07-18 | End: 2019-07-21 | Stop reason: HOSPADM

## 2019-07-18 RX ADMIN — PROCHLORPERAZINE EDISYLATE 10 MG: 5 INJECTION INTRAMUSCULAR; INTRAVENOUS at 19:23

## 2019-07-18 RX ADMIN — PREDNISOLONE ACETATE 1 DROP: 10 SUSPENSION/ DROPS OPHTHALMIC at 13:19

## 2019-07-18 RX ADMIN — HYDROMORPHONE HYDROCHLORIDE 4 MG: 2 TABLET ORAL at 19:02

## 2019-07-18 RX ADMIN — HYDROMORPHONE HYDROCHLORIDE 4 MG: 2 TABLET ORAL at 23:02

## 2019-07-18 RX ADMIN — ACETAMINOPHEN 650 MG: 325 TABLET, FILM COATED ORAL at 13:18

## 2019-07-18 RX ADMIN — PROCHLORPERAZINE EDISYLATE 10 MG: 5 INJECTION INTRAMUSCULAR; INTRAVENOUS at 06:14

## 2019-07-18 RX ADMIN — PREDNISOLONE ACETATE 1 DROP: 10 SUSPENSION/ DROPS OPHTHALMIC at 08:04

## 2019-07-18 RX ADMIN — CALCIUM GLUCONATE: 98 INJECTION, SOLUTION INTRAVENOUS at 20:06

## 2019-07-18 RX ADMIN — ACETAMINOPHEN 650 MG: 325 TABLET, FILM COATED ORAL at 17:16

## 2019-07-18 RX ADMIN — HYDROMORPHONE HYDROCHLORIDE 0.4 MG: 1 INJECTION, SOLUTION INTRAMUSCULAR; INTRAVENOUS; SUBCUTANEOUS at 15:42

## 2019-07-18 RX ADMIN — HYDROMORPHONE HYDROCHLORIDE 0.4 MG: 1 INJECTION, SOLUTION INTRAMUSCULAR; INTRAVENOUS; SUBCUTANEOUS at 06:14

## 2019-07-18 RX ADMIN — ONDANSETRON 4 MG: 2 INJECTION INTRAMUSCULAR; INTRAVENOUS at 11:00

## 2019-07-18 RX ADMIN — KETOROLAC TROMETHAMINE 1 DROP: 4 SOLUTION/ DROPS OPHTHALMIC at 08:05

## 2019-07-18 RX ADMIN — HYDROMORPHONE HYDROCHLORIDE 0.4 MG: 1 INJECTION, SOLUTION INTRAMUSCULAR; INTRAVENOUS; SUBCUTANEOUS at 08:20

## 2019-07-18 RX ADMIN — I.V. FAT EMULSION 250 ML: 20 EMULSION INTRAVENOUS at 20:06

## 2019-07-18 RX ADMIN — PROCHLORPERAZINE EDISYLATE 10 MG: 5 INJECTION INTRAMUSCULAR; INTRAVENOUS at 00:27

## 2019-07-18 RX ADMIN — ONDANSETRON 4 MG: 2 INJECTION INTRAMUSCULAR; INTRAVENOUS at 04:30

## 2019-07-18 RX ADMIN — SODIUM CHLORIDE, PRESERVATIVE FREE: 5 INJECTION INTRAVENOUS at 08:05

## 2019-07-18 RX ADMIN — SODIUM CHLORIDE, PRESERVATIVE FREE: 5 INJECTION INTRAVENOUS at 21:29

## 2019-07-18 RX ADMIN — HYDROMORPHONE HYDROCHLORIDE 0.4 MG: 1 INJECTION, SOLUTION INTRAMUSCULAR; INTRAVENOUS; SUBCUTANEOUS at 11:00

## 2019-07-18 RX ADMIN — LEVOFLOXACIN 500 MG: 5 INJECTION, SOLUTION INTRAVENOUS at 18:02

## 2019-07-18 RX ADMIN — ONDANSETRON 4 MG: 2 INJECTION INTRAMUSCULAR; INTRAVENOUS at 17:16

## 2019-07-18 RX ADMIN — ACETAMINOPHEN 650 MG: 325 TABLET, FILM COATED ORAL at 21:24

## 2019-07-18 RX ADMIN — HYDROMORPHONE HYDROCHLORIDE 0.4 MG: 1 INJECTION, SOLUTION INTRAMUSCULAR; INTRAVENOUS; SUBCUTANEOUS at 01:22

## 2019-07-18 RX ADMIN — PROCHLORPERAZINE EDISYLATE 10 MG: 5 INJECTION INTRAMUSCULAR; INTRAVENOUS at 13:17

## 2019-07-18 RX ADMIN — POTASSIUM CHLORIDE 20 MEQ: 29.8 INJECTION, SOLUTION INTRAVENOUS at 08:05

## 2019-07-18 RX ADMIN — KETOROLAC TROMETHAMINE 1 DROP: 4 SOLUTION/ DROPS OPHTHALMIC at 13:19

## 2019-07-18 ASSESSMENT — ACTIVITIES OF DAILY LIVING (ADL)
ADLS_ACUITY_SCORE: 14

## 2019-07-18 NOTE — PROGRESS NOTES
"CLINICAL NUTRITION SERVICES - BRIEF NOTE     Nutrition Prescription    Recommendations already ordered by Registered Dietitian (RD):  --Use dosing weight 50 kg  --Begin CPN, goal volume 1320 ml/day with initial 105 g Dex daily (357 kcal, GIR ~1.5), 85 g AA daily (340 kcal), and 250 ml 20% IV lipids 5x/week.  Micro/Rx: Infuvite or per PharmD  --ONLY once pt receives ~100% of initial continuous PN volume with K+/Mg++/Phos WNL, advance PN dex by 45 g every 1-2 days (pending lytes/Glu and Pharm D/RD discretion) to goal of 150 g Dex (510 kcal) to increase provisions to 1207 kcals (24 kcal/kg/day), 1.7 g PRO/kg/day, GIR ~2.1 with 29% kcals from Fat.    Future/Additional Recommendations:  Continue to monitor for diet advancement pending resolution of ileus.     *Refer to RD note on  for full nutrition assessment details.    Provider Order: Pharmacy/Nutrition to start & manage TPN: \"Persistent Ileus\"        NEW FINDINGS   Labs: M.9 and K+: 3.4 (both WNL); - Phos: 4.6 (H)       INTERVENTIONS  Implementation  Parenteral Nutrition/IV Fluids - Initiate (see above)       Monitoring/Evaluation  Will continue to monitor and evaluate per protocol.      Tiffany Diaz RD, LD   5A ([phone])/7B floor pager [phone]    "

## 2019-07-18 NOTE — PLAN OF CARE
"/76 (BP Location: Left leg)   Pulse 98   Temp 98.5  F (36.9  C) (Oral)   Resp 16   Ht 1.473 m (4' 10\")   Wt 76.3 kg (168 lb 4.8 oz)   SpO2 94%   BMI 35.17 kg/m       Soft spoken.   Neuro: A&O times 4.  Cardiac: HR 98, stable.   Respiratory: No SOB, RR 16. Lungs diminished & clear.   GI/: Urostomy with stent, adequate urine output, ileostomy with medium BM's & more flatus now. Hypo active BS.  Diet/Appetite: NPO, ice chips & meds only. TPN starting today. K+ & Mag+ replaced per protocol. Nauseated- zofran & compazine with good relief.  Skin: midline incision with staples- ligia & cdi- small bruising around it.   Labs: , Mag+ 1.9, K+ 3.4, Hgb 9.7.   LDA: PICC intact with blood return. NS infusing per order. LEANDRO with sero sang.   Activity: Sba- working with PT. Ambulating hallways with spouse.   Pain: IV dilaudid for pain & tylenol with good relief.     Plan: Continue with current regime & notify team with new changes.     "

## 2019-07-18 NOTE — PLAN OF CARE
Discharge Planner OT   Patient plan for discharge: home   Current status: Pt sit<>stand SBA FWW, ambulated 450ft FWW SBA with increased fatigue. Educated on fatigue management, energy conservation, abdominal precautions as Pt recalled 0/3 precautions. Pt brushed teeth and hair Deedee while seated on chair. Educated on available Ae. Pt don pants Deedee using AE and seated, adjusted socks Deedee with figure four technique.   Barriers to return to prior living situation: medical status, pain, fatigue  Recommendations for discharge: home with assist  Rationale for recommendations: Pt to benefit from further assistance for heavier IADLs to maintain post surgical precautions.        Entered by: Fiordaliza Whitley 07/18/2019 10:32 AM

## 2019-07-18 NOTE — PLAN OF CARE
Vital signs:  Temp: 98  F (36.7  C) Temp src: Oral BP: 148/65 Heart Rate: 97 Resp: 16 SpO2: 94 % O2 Device: None (Room air)     Neuro: A&Ox4. Neuros intact.   Cardiac: HR 97, denies chest pain.   Respiratory: LS clear, denies SOB.   Pain: Abdominal pain managed with PRN IV & oral dilaudid.  Diet: NPO, ice chips.   GI/: Urostomy with adequate UOP. Colostomy intact with small amount of stool in the pouch.   Incision: Midline incision with staples CDI, KYLE.   LDA: PICC double lumen, infusing NS at 100 ml/hr.   Activity: Activity encouraged, up ambulating in halls w/SBA.   New changes this shift: Pt c/o nausea, emesis x2 overnight. PRN IV Zofran & Compazine with some relief. Aromatherapy utilized, cool cloth applied on the forehead.   Plan:  at bedside. Continue POC.

## 2019-07-18 NOTE — PLAN OF CARE
Discharge Planner PT   Patient plan for discharge: home  Current status: Pt has been ambulating hallways frequently with . Encouraged pt to trial ambulation w/ IV pole, pt ambulates 300'x 1 w/ SBA. Mild increased in instability but no LOB. Pt fatigues with distance. Encouraged pt to trial short distance ambulation (as in to and from bathroom) with IV pole to progress stability overnight.   Barriers to return to prior living situation: medical  Recommendations for discharge: home w/ assist  Rationale for recommendations: Pt is mobilizing well enough to discharge home when medically ready. Will monitor for need for FWW at time of discharge.        Entered by: Natalya Howell 07/18/2019 4:18 PM

## 2019-07-18 NOTE — PROGRESS NOTES
"I saw and evaluated the patient and agree with the resident note and plan of care. She had more emesis overnight. Feels okay this morning. Abdomen benign. Will try to hold off on NG tube. Will start TPN as she is 8 days from surgery without substantial nutrition.    Adriana Najera MD  Reconstructive Urology Fellow    -----------------------------------------------------------------------------------------------------------------    Urology  Progress Note  - CT abd/pelvis yesterday with evidence of ileus   - NAEON  - AF, HTN (SBP 160s)  - Pain still present but less intense than the past 2 days  - + Vomiting ovn x 3  - Tolerating minimal amount of ice chips  - Little output from ileostomy  - Ambulating    Exam  /65 (BP Location: Left leg)   Pulse 93   Temp 98  F (36.7  C) (Oral)   Resp 16   Ht 1.473 m (4' 10\")   Wt 76.3 kg (168 lb 4.8 oz)   SpO2 94%   BMI 35.17 kg/m    No acute distress  Unlabored breathing on room air  Abdomen soft but distended, appropriately tender to palpation. L abdominal ileostomy pink and healthy w/ minimal liquid and gas. Laparotomy incision C/D/I with staples, no clear evidence of fluctuance/drainage   R abdominal urostomy with stoma pink and viable with single stent. Pale, clear yellow urine in bag.   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 Findings most suggestive of postoperative ileus, however there is  wall thickening in the distal aspect of the dilated loops of bowel  suggesting an ileitis. No late findings of ischemia or focal  transition " point.  2. There is a moderate amount of stranding within the mesentery with 2  small fluid collections, one of which appears to have blood in it  extending down into the right pelvis. Intra-abdominal drain does not  connect with these fluid collections. These findings are likely  postoperative.  3. External ureteral stent with tip terminating in the superior pole  of the solitary left kidney. Mild urothelial enhancement without  significant hydronephrosis. This is nonspecific and may be related to  irritation from ureteral stent as well as inflammatory changes however  ascending infection cannot be excluded. Correlate with urinalysis.  4. Incidentally noted duplicated IVC.  5. Findings most suggestive of lingular wedge resection, although  these findings are incompletely visualized. Recommend correlation with  outside operative reports and imaging studies. Otherwise dedicated  chest CT could be used for further evaluation.  6. Spina bifida at the level of the sacrum with cystic structure  extending posteriorly through the osseous defect. This may be a  meningocele. Involvement of the nerve roots cannot be evaluated on  this examination. Recommend correlation with outside studies if  available.  7. Bilateral small right and trace left pleural effusions with  overlying atelectasis versus consolidation.    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     Neuro: PRN tylenol, ibuprofen, dilaudid IV and PO PRN.  CV: HDS. LE US 7/14 negative for DVT.   Pulm: incentive spirometry while awake  FEN/GI: Will consider placement of NGT in light of persistent vomiting. Remains NPO, MIVF at 100/hr.  Endo: DIRK   : monitor urine output.  LEANDRO Cr WNL. Will hold on stent and LEANDRO removal pending resolution of N/V.  Heme/ID: Hgb pending. Afebrile x24 hours. Repeat urine culture negative. Levaquin  added 7/14.   Activity: Up ad antonella. Encourage ambulation. HOB > 30 degrees while in bed.  PPx: SCDs  Dispo: Pending. WOCRN to assist with stoma cares. TCU is recommended per PT/OT    Seen and evaluated with chief resident, will discuss with Dr. Causey.    Chip Ronquillo MD  Urology PGY-2           Contacting the Urology Team     Please use the following job codes to reach the Urology Team. Note that you must use an in house phone and that job codes cannot receive text pages.     On weekdays, dial [phone] (or star-star-star 777 on the new [place] telephones) then 0817 to reach the Adult Urology resident or PA on call    On weekdays, dial [phone] (or star-star-star 777 on the new [place] telephones) then 0818 to reach the Pediatric Urology resident    On weeknights and weekends, dial [phone] (or star-star-star 777 on the new [employer] telephones) then [phone] to reach the Urology resident on call (for both Adult and Pediatrics)

## 2019-07-19 ENCOUNTER — APPOINTMENT (OUTPATIENT)
Dept: OCCUPATIONAL THERAPY | Facility: CLINIC | Age: 49
End: 2019-07-19
Attending: UROLOGY
Payer: COMMERCIAL

## 2019-07-19 LAB
ALBUMIN SERPL-MCNC: 2 G/DL (ref 3.4–5)
ALP SERPL-CCNC: 45 U/L (ref 40–150)
ALT SERPL W P-5'-P-CCNC: 17 U/L (ref 0–50)
ANION GAP SERPL CALCULATED.3IONS-SCNC: 5 MMOL/L (ref 3–14)
AST SERPL W P-5'-P-CCNC: 14 U/L (ref 0–45)
BACTERIA SPEC CULT: NO GROWTH
BACTERIA SPEC CULT: NO GROWTH
BILIRUB DIRECT SERPL-MCNC: <0.1 MG/DL (ref 0–0.2)
BILIRUB SERPL-MCNC: 0.1 MG/DL (ref 0.2–1.3)
BUN SERPL-MCNC: 14 MG/DL (ref 7–30)
CALCIUM SERPL-MCNC: 7.7 MG/DL (ref 8.5–10.1)
CHLORIDE SERPL-SCNC: 104 MMOL/L (ref 94–109)
CO2 SERPL-SCNC: 28 MMOL/L (ref 20–32)
CREAT SERPL-MCNC: 0.8 MG/DL (ref 0.52–1.04)
ERYTHROCYTE [DISTWIDTH] IN BLOOD BY AUTOMATED COUNT: 13 % (ref 10–15)
GFR SERPL CREATININE-BSD FRML MDRD: 85 ML/MIN/{1.73_M2}
GLUCOSE BLDC GLUCOMTR-MCNC: 100 MG/DL (ref 70–99)
GLUCOSE BLDC GLUCOMTR-MCNC: 117 MG/DL (ref 70–99)
GLUCOSE BLDC GLUCOMTR-MCNC: 126 MG/DL (ref 70–99)
GLUCOSE SERPL-MCNC: 120 MG/DL (ref 70–99)
HCT VFR BLD AUTO: 25.6 % (ref 35–47)
HGB BLD-MCNC: 8 G/DL (ref 11.7–15.7)
INR PPP: 1.05 (ref 0.86–1.14)
Lab: NORMAL
Lab: NORMAL
MAGNESIUM SERPL-MCNC: 2.1 MG/DL (ref 1.6–2.3)
MCH RBC QN AUTO: 29 PG (ref 26.5–33)
MCHC RBC AUTO-ENTMCNC: 31.3 G/DL (ref 31.5–36.5)
MCV RBC AUTO: 93 FL (ref 78–100)
PHOSPHATE SERPL-MCNC: 3.4 MG/DL (ref 2.5–4.5)
PLATELET # BLD AUTO: 362 10E9/L (ref 150–450)
POTASSIUM SERPL-SCNC: 3.8 MMOL/L (ref 3.4–5.3)
PREALB SERPL IA-MCNC: 19 MG/DL (ref 15–45)
PROT SERPL-MCNC: 5.4 G/DL (ref 6.8–8.8)
RBC # BLD AUTO: 2.76 10E12/L (ref 3.8–5.2)
SODIUM SERPL-SCNC: 137 MMOL/L (ref 133–144)
SPECIMEN SOURCE: NORMAL
SPECIMEN SOURCE: NORMAL
WBC # BLD AUTO: 8.5 10E9/L (ref 4–11)

## 2019-07-19 PROCEDURE — 84100 ASSAY OF PHOSPHORUS: CPT | Performed by: STUDENT IN AN ORGANIZED HEALTH CARE EDUCATION/TRAINING PROGRAM

## 2019-07-19 PROCEDURE — 83735 ASSAY OF MAGNESIUM: CPT | Performed by: STUDENT IN AN ORGANIZED HEALTH CARE EDUCATION/TRAINING PROGRAM

## 2019-07-19 PROCEDURE — 12000001 ZZH R&B MED SURG/OB UMMC

## 2019-07-19 PROCEDURE — 85027 COMPLETE CBC AUTOMATED: CPT | Performed by: STUDENT IN AN ORGANIZED HEALTH CARE EDUCATION/TRAINING PROGRAM

## 2019-07-19 PROCEDURE — 25000128 H RX IP 250 OP 636: Performed by: STUDENT IN AN ORGANIZED HEALTH CARE EDUCATION/TRAINING PROGRAM

## 2019-07-19 PROCEDURE — 25800030 ZZH RX IP 258 OP 636: Performed by: PHYSICIAN ASSISTANT

## 2019-07-19 PROCEDURE — 80053 COMPREHEN METABOLIC PANEL: CPT | Performed by: STUDENT IN AN ORGANIZED HEALTH CARE EDUCATION/TRAINING PROGRAM

## 2019-07-19 PROCEDURE — 85610 PROTHROMBIN TIME: CPT | Performed by: STUDENT IN AN ORGANIZED HEALTH CARE EDUCATION/TRAINING PROGRAM

## 2019-07-19 PROCEDURE — 25000132 ZZH RX MED GY IP 250 OP 250 PS 637: Performed by: STUDENT IN AN ORGANIZED HEALTH CARE EDUCATION/TRAINING PROGRAM

## 2019-07-19 PROCEDURE — 25000132 ZZH RX MED GY IP 250 OP 250 PS 637: Performed by: PHYSICIAN ASSISTANT

## 2019-07-19 PROCEDURE — 25000125 ZZHC RX 250: Performed by: UROLOGY

## 2019-07-19 PROCEDURE — 82248 BILIRUBIN DIRECT: CPT | Performed by: STUDENT IN AN ORGANIZED HEALTH CARE EDUCATION/TRAINING PROGRAM

## 2019-07-19 PROCEDURE — 84134 ASSAY OF PREALBUMIN: CPT | Performed by: STUDENT IN AN ORGANIZED HEALTH CARE EDUCATION/TRAINING PROGRAM

## 2019-07-19 PROCEDURE — 97535 SELF CARE MNGMENT TRAINING: CPT | Mod: GO | Performed by: OCCUPATIONAL THERAPIST

## 2019-07-19 PROCEDURE — 00000146 ZZHCL STATISTIC GLUCOSE BY METER IP

## 2019-07-19 PROCEDURE — 97530 THERAPEUTIC ACTIVITIES: CPT | Mod: GO | Performed by: OCCUPATIONAL THERAPIST

## 2019-07-19 PROCEDURE — 25000128 H RX IP 250 OP 636: Performed by: PHYSICIAN ASSISTANT

## 2019-07-19 PROCEDURE — 36592 COLLECT BLOOD FROM PICC: CPT | Performed by: STUDENT IN AN ORGANIZED HEALTH CARE EDUCATION/TRAINING PROGRAM

## 2019-07-19 PROCEDURE — G0463 HOSPITAL OUTPT CLINIC VISIT: HCPCS

## 2019-07-19 RX ORDER — LEVOFLOXACIN 250 MG/1
250 TABLET, FILM COATED ORAL DAILY
Status: COMPLETED | OUTPATIENT
Start: 2019-07-19 | End: 2019-07-20

## 2019-07-19 RX ORDER — RIZATRIPTAN BENZOATE 10 MG/1
10 TABLET ORAL
Status: DISCONTINUED | OUTPATIENT
Start: 2019-07-19 | End: 2019-07-21 | Stop reason: HOSPADM

## 2019-07-19 RX ADMIN — PREDNISOLONE ACETATE 1 DROP: 10 SUSPENSION/ DROPS OPHTHALMIC at 20:21

## 2019-07-19 RX ADMIN — ONDANSETRON 4 MG: 2 INJECTION INTRAMUSCULAR; INTRAVENOUS at 17:50

## 2019-07-19 RX ADMIN — ACETAMINOPHEN 650 MG: 325 TABLET, FILM COATED ORAL at 14:04

## 2019-07-19 RX ADMIN — KETOROLAC TROMETHAMINE 1 DROP: 4 SOLUTION/ DROPS OPHTHALMIC at 20:22

## 2019-07-19 RX ADMIN — SODIUM CHLORIDE, PRESERVATIVE FREE 20 ML: 5 INJECTION INTRAVENOUS at 06:29

## 2019-07-19 RX ADMIN — POTASSIUM CHLORIDE 20 MEQ: 29.8 INJECTION, SOLUTION INTRAVENOUS at 11:28

## 2019-07-19 RX ADMIN — CALCIUM GLUCONATE: 98 INJECTION, SOLUTION INTRAVENOUS at 20:24

## 2019-07-19 RX ADMIN — ACETAMINOPHEN 650 MG: 325 TABLET, FILM COATED ORAL at 01:27

## 2019-07-19 RX ADMIN — LEVOFLOXACIN 250 MG: 250 TABLET, FILM COATED ORAL at 17:51

## 2019-07-19 RX ADMIN — ONDANSETRON 4 MG: 2 INJECTION INTRAMUSCULAR; INTRAVENOUS at 01:28

## 2019-07-19 RX ADMIN — SODIUM CHLORIDE, PRESERVATIVE FREE: 5 INJECTION INTRAVENOUS at 15:35

## 2019-07-19 RX ADMIN — PROCHLORPERAZINE EDISYLATE 10 MG: 5 INJECTION INTRAMUSCULAR; INTRAVENOUS at 21:18

## 2019-07-19 RX ADMIN — KETOROLAC TROMETHAMINE 1 DROP: 4 SOLUTION/ DROPS OPHTHALMIC at 14:05

## 2019-07-19 RX ADMIN — KETOROLAC TROMETHAMINE 1 DROP: 4 SOLUTION/ DROPS OPHTHALMIC at 09:29

## 2019-07-19 RX ADMIN — SODIUM CHLORIDE, PRESERVATIVE FREE: 5 INJECTION INTRAVENOUS at 07:19

## 2019-07-19 RX ADMIN — HYDROMORPHONE HYDROCHLORIDE 0.4 MG: 1 INJECTION, SOLUTION INTRAMUSCULAR; INTRAVENOUS; SUBCUTANEOUS at 23:35

## 2019-07-19 RX ADMIN — PREDNISOLONE ACETATE 1 DROP: 10 SUSPENSION/ DROPS OPHTHALMIC at 14:05

## 2019-07-19 RX ADMIN — I.V. FAT EMULSION 250 ML: 20 EMULSION INTRAVENOUS at 20:24

## 2019-07-19 RX ADMIN — PREDNISOLONE ACETATE 1 DROP: 10 SUSPENSION/ DROPS OPHTHALMIC at 09:30

## 2019-07-19 ASSESSMENT — ACTIVITIES OF DAILY LIVING (ADL)
ADLS_ACUITY_SCORE: 14
ADLS_ACUITY_SCORE: 15
ADLS_ACUITY_SCORE: 14

## 2019-07-19 NOTE — PLAN OF CARE
"/80 (BP Location: Left leg)   Pulse 101   Temp 98.3  F (36.8  C) (Axillary)   Resp 16   Ht 1.473 m (4' 10\")   Wt 76.3 kg (168 lb 4.8 oz)   SpO2 96%   BMI 35.17 kg/m      Neuro: A&Ox4; calls appropriately  Respiratory: sats 96% on RA. Denies SOB  Cardiac: tachy and hypertensive  GI/: +BS, +passing flatus, Ileostomy with moderate greenish output. Urostomy with 1 stent and adequate UOP.    Diet: NPO; continuous TPN at 55 mL/hr with lipids  Pain/Nausea: C/O abdominal pain. Prn: dilaudid po, tylenol and dilaudid IV given with some relief. Intermittently nauseated; zofran and compazine given with relief.  Incisions/Drains: abdomen- midline stapled, no drainage. Left LEANDRO with minimal output.    IV Access: Left PIV- infusing NS at 100 mL/hr.   Labs: B  Activity: Out of bed/chair with assist of 1 but walks independently    Plan: Continue plan of care    "

## 2019-07-19 NOTE — PROGRESS NOTES
"SPIRITUAL HEALTH SERVICES  SPIRITUAL ASSESSMENT Progress Note  [facility] ([place]) 7B     REFERRAL SOURCE: Length of Stay    Patient, Karo Lindsay, says, \"my  is visiting.\" Karo and her , Edi, knows how to get in contact with spiritual health and that there will be an on- at the hospital during the weekend if she needs it.    PLAN: Visit only when requested since her own  is visiting.      Arnaldo Rodriguez   Intern  Pager [phone]    "

## 2019-07-19 NOTE — PLAN OF CARE
Discharge Planner OT   Patient plan for discharge: not stated  Current status: Pt completed bed mobility mod A, STS tranfers w/ CGA. Pt completed full shower seated w/ overall mod A, OT educating pt on proper body mechanics and post surgical precautions throughout.   Barriers to return to prior living situation: medical status  Recommendations for discharge: home w/ A  Rationale for recommendations: A from spouse for heavy lifting        Entered by: Hallie Orona 07/19/2019 3:44 PM

## 2019-07-19 NOTE — PROGRESS NOTES
"I saw and evaluated the patient and agree with the resident note and plan of care. Seen with Dr. Causey. Okay for full liquids this evening. Stent and LEANDRO out tomorrow. Staples out Sunday if patient goes home Sunday. Regular diet tomorrow pending all goes well today.     Adriana Najera MD  Reconstructive Urology Fellow    ----------------------------------------------------------------------------------------------------------------------------------      Urology  Progress Note    - NAEON  - AF, intermittent HTN (-171)  - Pain improved  - Gas and stool from ileostomy  - Ambulating    Exam  /59 (BP Location: Left arm)   Pulse 91   Temp 98.3  F (36.8  C) (Oral)   Resp 16   Ht 1.473 m (4' 10\")   Wt 76.3 kg (168 lb 4.8 oz)   SpO2 93%   BMI 35.17 kg/m    No acute distress  Unlabored breathing on room air  Abdomen soft, mildly distended, minimally tender. L abdominal ileostomy pink and healthy w/ gas and liquid stool. Laparotomy incision C/D/I with staples, no clear evidence of fluctuance/drainage   R abdominal urostomy with stoma pink and viable with single stent. Clear yellow urine in bag.   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     Neuro: PRN tylenol, ibuprofen, dilaudid IV and PO PRN, flexeril.  CV: HDS. LE US 7/14 negative for DVT.   Pulm: incentive spirometry while awake  FEN/GI: NPO, will consider advancing diet, MIVF for total fluid goal of 100/hr, PICC/TPN.  Endo: DIRK   : Monitor urine output.  LEANDRO Cr WNL. Will hold on stent and LEANDRO removal " pending resolution of N/V.  Heme/ID: Hgb pending. Afebrile x24 hours. Repeat urine culture negative. Levaquin added 7/14.   Activity: Up ad antonella. Encourage ambulation. HOB > 30 degrees while in bed.  PPx: SCDs  Dispo: Pending. WOCRN to assist with stoma cares. TCU is recommended per PT/OT    Seen and evaluated with chief resident, will discuss with Dr. Causey.    Chip Ronquillo MD  Urology PGY-2         Contacting the Urology Team     Please use the following job codes to reach the Urology Team. Note that you must use an in house phone and that job codes cannot receive text pages.     On weekdays, dial [phone] (or star-star-star 777 on the new [place] telephones) then 0817 to reach the Adult Urology resident or PA on call    On weekdays, dial [phone] (or star-star-star 777 on the new [place] telephones) then 0818 to reach the Pediatric Urology resident    On weeknights and weekends, dial [phone] (or star-star-star 777 on the new [place] telephones) then [phone] to reach the Urology resident on call (for both Adult and Pediatrics)

## 2019-07-19 NOTE — PLAN OF CARE
PT: Attempted to see pt this pm but pt fairly fatigued after a few walks and taking a shower this afternoon. Pt declined OOB for now but may be agreeable at later time. PT will check back as schedule allows or reschedule to tomorrow.

## 2019-07-19 NOTE — PROGRESS NOTES
"/51 (BP Location: Left arm)   Pulse 92   Temp 99.4  F (37.4  C) (Oral)   Resp 16   Ht 1.473 m (4' 10\")   Wt 76.3 kg (168 lb 4.8 oz)   SpO2 93%   BMI 35.17 kg/m       Vitals: Tmax 99.4, HR 92 & other VSS.  Neuro: A&O times 4.   Cardiac: HR 92. Denies chest pain.   Respiratory: RR 16, denies SOB. O2 93% on RA.   GI/: Urostomy with 1 stent, pouch changed & education given by [facility] nurse- adequate urine output. Ileostomy with watery green stools- pouch changed by [facility] RN m& by writer again- leaking. Flatus present in bag, active BS.    Diet/Appetite: FLD started, denies nausea & fair PO intake. TPN per order.   Skin: Mid abd incision with staples, ligia & cdi.   Labs: K+ 3.8 Replaced per protocol. Hgb 8.0, .   LDA: PICC intact with dressing cdi- bruised site. LEANDRO with 5 ml output/ serosang.  Activity: Up sba with spouse. OT working with pt with shower.   Pain: Tylenol for pain/ no other PRN needed.   Plan: Continue with TPN & encourage activity. Notify team with new changes.     "

## 2019-07-19 NOTE — PROGRESS NOTES
WOC Nurse Inpatient [facility]   WO Nurse Inpatient Adult     Initial Assessment   Assessment of revised ileal conduit Stoma complications: none  Mucocutaneous junction Ileal conduit: intact   Peristomal complication(s) ileal conduit: none  Pouch wear time:3-4 days,   Following today's visit:Patient is  able to demonstrate; patient has had ileal conduit and ileostomy since birth      1. How to empty their pouch? yes      2. How to change their pouch?  yes      3. How to read and record intake and output correctly? Yes    Assessment of established end Ileostomy Stoma   complication(s) prolapse   MCJ: intact  Peristomal complications: erosions to peristomal skin, per patient have always been there, has had issues with leakage off and on throughout her life. 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  Current Diet/Nutrition: Orders Placed This Encounter      Advance Diet as Tolerated: Full Liquid Diet     TPN no   I/O last 3 completed shifts:  In: 2002.33 [P.O.:150; I.V.:1852.33]  Out: 2140 [Urine:1600; Drains:10; Stool:530]  Labs:    Recent Labs   Lab 07/19/19  0637   ALBUMIN 2.0*   HGB 8.0*   INR 1.05   WBC 8.5        Physical Exam:  Ileal Conduit s/p revision on 7/10/19  Current pouching system:Nathalia two piece flat,  Reason for pouch change today: routine schedule  Stoma appearance: viable, healthy, edematous  Stoma size; 38mm, one stent in place  Peristomal skin: intact  Stoma output :clear and yellow   Abdominal  Assessment  soft , NG still in place? No  Surgical Site: staples intact  Pain: Cramping and Sharp  Is patient still on a PCA no    Ileostomy, established   Current pouching system:Nathalia soft convex fecal pouch cut to 30mm, andreea ring x 3, and coloplast  protective sheet. Powder and barrier film applied to peristomal skin as well.   Reason for pouch change today: scheduled change   Stoma appearance: viable, healthy, normal-appearing and prolapsed  Stoma size; pouch cut to 30mm, stomal base is larger however.   Peristomal skin: chronic erosions  Stoma output :brown,  watery     Interventions:  Patient's chart evaluated.  Focus of today's visit: refitting of appliance   Participant of teaching session today patient  and spouse  Change made with ostomy management today: No  Patient/family: lethargic and observing  Supplies:Gathered and at bedside    Plan:  Learning needs: completed  Preparation for discharge: No discharge preparations started  Recommend home care? yes and by home WOC nurse if possible, patient requesting some assistance after discharge, spoke with CCRN.     Discussed plan of care with Patient and Nurse  Nursing to notify the Provider(s) and re-consult the WOC Nurse if new ostomy concerns or discharge planned before next planned WOC visit.    WOC Nurse will return: Friday  Face to face time: 40 minutes    Vonda Cameron

## 2019-07-19 NOTE — DISCHARGE INSTRUCTIONS
__________________________________________________________  D/C'ing nurse: Please fax to following agencies at time of patient d/c.  ____________________________________________________________    [facility]  Fax: [phone]      UROLOGY DISCHARGE INSTRUCTIONS  Activity  - No strenuous exercise for 6 weeks.  - No lifting, pushing, pulling more than 10 pounds for 6 weeks.   - Do not strain with bowel movements.  - Do not drive until you can press the brake pedal quickly and fully without pain.   - Do not operate a motor vehicle while taking narcotic pain medications.     Incisions  - You may shower and get incisions wet starting 48 hrs after surgery.  - Do not scrub incisions or submerge wounds for 2 weeks or until seen in follow-up.   - Remove wound dressing 48 hours after surgery.   - If purple dermabond glue was used, avoid applying any lotions or ointments.   - If steri-strips were used, they will fall off on their own.   - Leave incision open to air. Cover with gauze only if needed for comfort or to protect clothing from drainage.   - The stitches do not need to be removed, they will dissolve on their own.    Medications  - Transition from narcotic pain medications to tylenol (acetaminophen) as you are able.  Wean yourself off all pain medications as you are able.  - Some pain medications contain both tylenol (acetaminophen) and a narcotic (Norco, vicodin, percocet), do not take more than 4,000mg of Tylenol (acetaminophen) from all sources in any 24 hour period.  - Narcotics can make you constipated.  Take over the counter fiber (metamucil or benefiber) and stool softeners (miralax, docusate or senna) while taking narcotic pain medications, but stop if you develop diarrhea.  - No driving or operating machinery while taking narcotic pain medications     Follow-Up:  - Follow-up with your surgeon as scheduled on 8/13/19 at 12:00 pm. Please keep this appointment.  - Call or return sooner than your  "regularly scheduled visit if you develop any of the following: fever (greater than 101.5), uncontrolled pain, uncontrolled nausea or vomiting, as well as increased redness, swelling, or drainage from your wound.     Phone numbers:   - Monday through Friday 8am to 4:30pm: Call [phone] with questions or to schedule or confirm appointment.    - Nights or weekends: call the after hours emergency pager - [phone] and tell the  \"I would like to page the Urology Resident on call.\"  - For emergencies, call [phone]  "

## 2019-07-20 ENCOUNTER — APPOINTMENT (OUTPATIENT)
Dept: PHYSICAL THERAPY | Facility: CLINIC | Age: 49
End: 2019-07-20
Attending: UROLOGY
Payer: COMMERCIAL

## 2019-07-20 LAB
ANION GAP SERPL CALCULATED.3IONS-SCNC: 6 MMOL/L (ref 3–14)
ANION GAP SERPL CALCULATED.3IONS-SCNC: NORMAL MMOL/L (ref 6–17)
BUN SERPL-MCNC: 16 MG/DL (ref 7–30)
BUN SERPL-MCNC: NORMAL MG/DL (ref 7–30)
CALCIUM SERPL-MCNC: 7.9 MG/DL (ref 8.5–10.1)
CALCIUM SERPL-MCNC: NORMAL MG/DL (ref 8.5–10.1)
CHLORIDE SERPL-SCNC: 106 MMOL/L (ref 94–109)
CHLORIDE SERPL-SCNC: NORMAL MMOL/L (ref 94–109)
CO2 SERPL-SCNC: 25 MMOL/L (ref 20–32)
CO2 SERPL-SCNC: NORMAL MMOL/L (ref 20–32)
CREAT SERPL-MCNC: 0.9 MG/DL (ref 0.52–1.04)
CREAT SERPL-MCNC: NORMAL MG/DL (ref 0.52–1.04)
ERYTHROCYTE [DISTWIDTH] IN BLOOD BY AUTOMATED COUNT: 13.1 % (ref 10–15)
GFR SERPL CREATININE-BSD FRML MDRD: 75 ML/MIN/{1.73_M2}
GFR SERPL CREATININE-BSD FRML MDRD: NORMAL ML/MIN/{1.73_M2}
GLUCOSE BLDC GLUCOMTR-MCNC: 115 MG/DL (ref 70–99)
GLUCOSE BLDC GLUCOMTR-MCNC: 121 MG/DL (ref 70–99)
GLUCOSE BLDC GLUCOMTR-MCNC: 143 MG/DL (ref 70–99)
GLUCOSE SERPL-MCNC: 126 MG/DL (ref 70–99)
GLUCOSE SERPL-MCNC: NORMAL MG/DL (ref 70–99)
HCT VFR BLD AUTO: 25.9 % (ref 35–47)
HGB BLD-MCNC: 9.5 G/DL (ref 11.7–15.7)
MAGNESIUM SERPL-MCNC: 1.8 MG/DL (ref 1.6–2.3)
MAGNESIUM SERPL-MCNC: NORMAL MG/DL (ref 1.6–2.3)
MCH RBC QN AUTO: 35.8 PG (ref 26.5–33)
MCHC RBC AUTO-ENTMCNC: 36.7 G/DL (ref 31.5–36.5)
MCV RBC AUTO: 98 FL (ref 78–100)
PHOSPHATE SERPL-MCNC: 3.4 MG/DL (ref 2.5–4.5)
PHOSPHATE SERPL-MCNC: NORMAL MG/DL (ref 2.5–4.5)
PLATELET # BLD AUTO: 372 10E9/L (ref 150–450)
POTASSIUM SERPL-SCNC: 4 MMOL/L (ref 3.4–5.3)
POTASSIUM SERPL-SCNC: NORMAL MMOL/L (ref 3.4–5.3)
RBC # BLD AUTO: 2.65 10E12/L (ref 3.8–5.2)
SODIUM SERPL-SCNC: 137 MMOL/L (ref 133–144)
SODIUM SERPL-SCNC: NORMAL MMOL/L (ref 133–144)
WBC # BLD AUTO: 8.8 10E9/L (ref 4–11)

## 2019-07-20 PROCEDURE — 00000146 ZZHCL STATISTIC GLUCOSE BY METER IP

## 2019-07-20 PROCEDURE — 25800030 ZZH RX IP 258 OP 636: Performed by: PHYSICIAN ASSISTANT

## 2019-07-20 PROCEDURE — 97116 GAIT TRAINING THERAPY: CPT | Mod: GP

## 2019-07-20 PROCEDURE — 97530 THERAPEUTIC ACTIVITIES: CPT | Mod: GP

## 2019-07-20 PROCEDURE — 25000132 ZZH RX MED GY IP 250 OP 250 PS 637: Performed by: STUDENT IN AN ORGANIZED HEALTH CARE EDUCATION/TRAINING PROGRAM

## 2019-07-20 PROCEDURE — 25000132 ZZH RX MED GY IP 250 OP 250 PS 637: Performed by: PHYSICIAN ASSISTANT

## 2019-07-20 PROCEDURE — 25000125 ZZHC RX 250: Performed by: PHYSICIAN ASSISTANT

## 2019-07-20 PROCEDURE — 85027 COMPLETE CBC AUTOMATED: CPT | Performed by: UROLOGY

## 2019-07-20 PROCEDURE — 84100 ASSAY OF PHOSPHORUS: CPT | Performed by: UROLOGY

## 2019-07-20 PROCEDURE — 25000128 H RX IP 250 OP 636: Performed by: STUDENT IN AN ORGANIZED HEALTH CARE EDUCATION/TRAINING PROGRAM

## 2019-07-20 PROCEDURE — 80048 BASIC METABOLIC PNL TOTAL CA: CPT | Performed by: UROLOGY

## 2019-07-20 PROCEDURE — 40000802 ZZH SITE CHECK

## 2019-07-20 PROCEDURE — 83735 ASSAY OF MAGNESIUM: CPT | Performed by: UROLOGY

## 2019-07-20 PROCEDURE — 12000001 ZZH R&B MED SURG/OB UMMC

## 2019-07-20 PROCEDURE — 36592 COLLECT BLOOD FROM PICC: CPT | Performed by: UROLOGY

## 2019-07-20 PROCEDURE — 25000128 H RX IP 250 OP 636: Performed by: PHYSICIAN ASSISTANT

## 2019-07-20 RX ADMIN — KETOROLAC TROMETHAMINE 1 DROP: 4 SOLUTION/ DROPS OPHTHALMIC at 10:55

## 2019-07-20 RX ADMIN — LEVOFLOXACIN 250 MG: 250 TABLET, FILM COATED ORAL at 18:01

## 2019-07-20 RX ADMIN — KETOROLAC TROMETHAMINE 1 DROP: 4 SOLUTION/ DROPS OPHTHALMIC at 20:07

## 2019-07-20 RX ADMIN — HYDROMORPHONE HYDROCHLORIDE 4 MG: 2 TABLET ORAL at 14:15

## 2019-07-20 RX ADMIN — PROCHLORPERAZINE EDISYLATE 10 MG: 5 INJECTION INTRAMUSCULAR; INTRAVENOUS at 21:35

## 2019-07-20 RX ADMIN — PROCHLORPERAZINE EDISYLATE 10 MG: 5 INJECTION INTRAMUSCULAR; INTRAVENOUS at 08:15

## 2019-07-20 RX ADMIN — Medication 2 G: at 20:07

## 2019-07-20 RX ADMIN — PROCHLORPERAZINE EDISYLATE 10 MG: 5 INJECTION INTRAMUSCULAR; INTRAVENOUS at 15:36

## 2019-07-20 RX ADMIN — ONDANSETRON 4 MG: 2 INJECTION INTRAMUSCULAR; INTRAVENOUS at 11:07

## 2019-07-20 RX ADMIN — HYDROMORPHONE HYDROCHLORIDE 4 MG: 2 TABLET ORAL at 20:19

## 2019-07-20 RX ADMIN — KETOROLAC TROMETHAMINE 1 DROP: 4 SOLUTION/ DROPS OPHTHALMIC at 15:45

## 2019-07-20 RX ADMIN — ACETAMINOPHEN 650 MG: 325 TABLET, FILM COATED ORAL at 21:04

## 2019-07-20 RX ADMIN — ACETAMINOPHEN 650 MG: 325 TABLET, FILM COATED ORAL at 15:51

## 2019-07-20 RX ADMIN — PREDNISOLONE ACETATE 1 DROP: 10 SUSPENSION/ DROPS OPHTHALMIC at 15:45

## 2019-07-20 RX ADMIN — PREDNISOLONE ACETATE 1 DROP: 10 SUSPENSION/ DROPS OPHTHALMIC at 10:55

## 2019-07-20 RX ADMIN — PREDNISOLONE ACETATE 1 DROP: 10 SUSPENSION/ DROPS OPHTHALMIC at 20:07

## 2019-07-20 RX ADMIN — SODIUM CHLORIDE, PRESERVATIVE FREE: 5 INJECTION INTRAVENOUS at 13:53

## 2019-07-20 RX ADMIN — ONDANSETRON 4 MG: 2 INJECTION INTRAMUSCULAR; INTRAVENOUS at 05:00

## 2019-07-20 RX ADMIN — POTASSIUM CHLORIDE 20 MEQ: 29.8 INJECTION, SOLUTION INTRAVENOUS at 22:17

## 2019-07-20 RX ADMIN — ONDANSETRON 4 MG: 2 INJECTION INTRAMUSCULAR; INTRAVENOUS at 18:03

## 2019-07-20 RX ADMIN — ACETAMINOPHEN 650 MG: 325 TABLET, FILM COATED ORAL at 12:33

## 2019-07-20 RX ADMIN — HYDROMORPHONE HYDROCHLORIDE 0.4 MG: 1 INJECTION, SOLUTION INTRAMUSCULAR; INTRAVENOUS; SUBCUTANEOUS at 05:00

## 2019-07-20 RX ADMIN — HYDROMORPHONE HYDROCHLORIDE 4 MG: 2 TABLET ORAL at 08:15

## 2019-07-20 ASSESSMENT — ACTIVITIES OF DAILY LIVING (ADL)
ADLS_ACUITY_SCORE: 14

## 2019-07-20 NOTE — PLAN OF CARE
Patient denied SOB, clear LS, VSS on RA. +BS, colostomy in place, mid abdominal incision ligia, with staples, cdi. LEANDRO intact with minimal serosang output. Urostomy with 1 stent, pink stoma, adequate urine output.  and 143 Q 6 hrs. Continuous TPN at 55 ml/hr with Lipids at 20.8, MIVF regulated to 45 ml/hr. PICC line both port infusing. Dilaudid 0.5 mg iv given 2x, Compazine iv at hs and Zofran iv at 0515 for nausea. Continue poc.

## 2019-07-20 NOTE — PLAN OF CARE
Patient walked 3-4 times this 12 hour shift with good tolerance. Good U/O from urostomy. Ileostomy drained brown liquid. Both appliances intact. Pain managed with Tylenol and oral Dilaudid. Started regular diet and continued to request Zofran and compazine. No emesis this shift. Eby=127,126,115.  Labia and pubic area continues to be swollen but no erythema.  at bedside and very supportive and helpful.

## 2019-07-20 NOTE — PLAN OF CARE
Discharge Planner PT   Patient plan for discharge: home with  to A, wh walker needed  Current status: PT Pt supine, agreeable to working with PT, some increased pain. Pt transferes min A sup>sit, mod A sit>supine  for LEs and maintaining precs with edu provided during. Hooklying bridge boost in bed light min A to maintain position. Pt ambulated 200' SBA to CGA with wh walker with improved posture over using IV pole, pain increasing and unable to trial without. Pt will likely need youth wh walker for home, will issue day of discharge. Pt asc/desc 4 stairs with CGA and B hands L rail with eud on technique and positioning.  Barriers to return to prior living situation: medical status, moving slowly, pain  Recommendations for discharge: home with A and may benefit from home PT, will issue HEP tomorrow  Rationale for recommendations: Anticipate pt will progress with functional I to discharge home with  to assist        Entered by: Uzma Ley 07/20/2019 5:18 PM

## 2019-07-20 NOTE — PROGRESS NOTES
"Urology  Progress Note    - NAEON  - AF, VSS  - Pain improved  - Improved gas and stool from ileostomy  - Nausea is improved, no vomiting  - Tolerated full liquids last night and regular diet this AM  - Appetite still not back completely  - Ambulating, has already walked today    Exam  /65 (BP Location: Left arm)   Pulse 81   Temp 99.3  F (37.4  C) (Oral)   Resp 18   Ht 1.473 m (4' 10\")   Wt 76.3 kg (168 lb 4.8 oz)   SpO2 94%   BMI 35.17 kg/m    No acute distress  Unlabored breathing on room air  Abdomen soft, mildly distended, minimally tender. L abdominal ileostomy pink and healthy w/ gas and green liquid stool. Laparotomy incision C/D/I with staples, no clear evidence of fluctuance/drainage   R abdominal urostomy with stoma pink and viable with single stent. Clear yellow urine in bag with some mucous.   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     Neuro: PRN tylenol, prn ibuprofen, prn PO dilaudid, prn flexeril.  CV: HDS. LE US 7/14 negative for DVT.   Pulm: incentive spirometry while awake  FEN/GI: Regular, MIVF for total fluid goal of 100/hr, PICC/TPN (will let TPN .  Endo: DIRK   : Monitor urine output.  LEANDRO Cr WNL. Will plan for stent and LEANDRO removal sequentially today/tomorrow.  Heme/ID: Hgb pending. Afebrile. Repeat urine culture negative. Levaquin added 7/14.   Activity: Up ad antonella. Encourage ambulation. HOB > 30 degrees while in bed.  PPx: SCDs, ambulation  Dispo: [facility] to assist with stoma cares. Home with assist per PT/OT. Likely discharge today vs tomorrow.    Seen and evaluated with " chief resident and staff Dr. Ojeda. Will discuss with Dr. Causey.    Chip Ronquillo MD  Urology PGY-2         Contacting the Urology Team     Please use the following job codes to reach the Urology Team. Note that you must use an in house phone and that job codes cannot receive text pages.     On weekdays, dial [phone] (or star-star-star 777 on the new [employer] telephones) then [phone] to reach the Adult Urology resident or PA on call    On weekdays, dial [phone] (or star-star-star 777 on the new [employer] telephones) then 0818 to reach the Pediatric Urology resident    On weeknights and weekends, dial [phone] (or star-star-star 777 on the new [employer] telephones) then [phone] to reach the Urology resident on call (for both Adult and Pediatrics)

## 2019-07-20 NOTE — PROGRESS NOTES
Brief Urology Note    LLQ LEANDRO drain removed and remaining ureteral stent removed via urostomy. Patient tolerated this well.    Chip Ronquillo MD  Urology, PGY2

## 2019-07-21 ENCOUNTER — APPOINTMENT (OUTPATIENT)
Dept: PHYSICAL THERAPY | Facility: CLINIC | Age: 49
End: 2019-07-21
Attending: UROLOGY
Payer: COMMERCIAL

## 2019-07-21 ENCOUNTER — PATIENT OUTREACH (OUTPATIENT)
Dept: CARE COORDINATION | Facility: CLINIC | Age: 49
End: 2019-07-21

## 2019-07-21 ENCOUNTER — APPOINTMENT (OUTPATIENT)
Dept: OCCUPATIONAL THERAPY | Facility: CLINIC | Age: 49
End: 2019-07-21
Attending: UROLOGY
Payer: COMMERCIAL

## 2019-07-21 VITALS
BODY MASS INDEX: 35.33 KG/M2 | SYSTOLIC BLOOD PRESSURE: 118 MMHG | RESPIRATION RATE: 18 BRPM | DIASTOLIC BLOOD PRESSURE: 57 MMHG | OXYGEN SATURATION: 97 % | HEIGHT: 58 IN | TEMPERATURE: 98.3 F | HEART RATE: 93 BPM | WEIGHT: 168.3 LBS

## 2019-07-21 PROBLEM — T85.858A ILEAL CONDUIT STOMAL STENOSIS: Status: ACTIVE | Noted: 2019-07-21

## 2019-07-21 LAB
ANION GAP SERPL CALCULATED.3IONS-SCNC: 9 MMOL/L (ref 3–14)
BUN SERPL-MCNC: 22 MG/DL (ref 7–30)
CALCIUM SERPL-MCNC: 8.4 MG/DL (ref 8.5–10.1)
CHLORIDE SERPL-SCNC: 105 MMOL/L (ref 94–109)
CO2 SERPL-SCNC: 23 MMOL/L (ref 20–32)
CREAT SERPL-MCNC: 0.88 MG/DL (ref 0.52–1.04)
ERYTHROCYTE [DISTWIDTH] IN BLOOD BY AUTOMATED COUNT: 13.2 % (ref 10–15)
GFR SERPL CREATININE-BSD FRML MDRD: 76 ML/MIN/{1.73_M2}
GLUCOSE BLDC GLUCOMTR-MCNC: 139 MG/DL (ref 70–99)
GLUCOSE SERPL-MCNC: 120 MG/DL (ref 70–99)
HCT VFR BLD AUTO: 30.2 % (ref 35–47)
HGB BLD-MCNC: 9.1 G/DL (ref 11.7–15.7)
MAGNESIUM SERPL-MCNC: 2.1 MG/DL (ref 1.6–2.3)
MCH RBC QN AUTO: 27.7 PG (ref 26.5–33)
MCHC RBC AUTO-ENTMCNC: 30.1 G/DL (ref 31.5–36.5)
MCV RBC AUTO: 92 FL (ref 78–100)
PLATELET # BLD AUTO: 400 10E9/L (ref 150–450)
POTASSIUM SERPL-SCNC: 4.2 MMOL/L (ref 3.4–5.3)
RBC # BLD AUTO: 3.29 10E12/L (ref 3.8–5.2)
SODIUM SERPL-SCNC: 137 MMOL/L (ref 133–144)
WBC # BLD AUTO: 12.8 10E9/L (ref 4–11)

## 2019-07-21 PROCEDURE — 00000146 ZZHCL STATISTIC GLUCOSE BY METER IP

## 2019-07-21 PROCEDURE — 85027 COMPLETE CBC AUTOMATED: CPT | Performed by: STUDENT IN AN ORGANIZED HEALTH CARE EDUCATION/TRAINING PROGRAM

## 2019-07-21 PROCEDURE — 25000132 ZZH RX MED GY IP 250 OP 250 PS 637: Performed by: STUDENT IN AN ORGANIZED HEALTH CARE EDUCATION/TRAINING PROGRAM

## 2019-07-21 PROCEDURE — 97530 THERAPEUTIC ACTIVITIES: CPT | Mod: GP

## 2019-07-21 PROCEDURE — 25000128 H RX IP 250 OP 636: Performed by: STUDENT IN AN ORGANIZED HEALTH CARE EDUCATION/TRAINING PROGRAM

## 2019-07-21 PROCEDURE — 36592 COLLECT BLOOD FROM PICC: CPT | Performed by: STUDENT IN AN ORGANIZED HEALTH CARE EDUCATION/TRAINING PROGRAM

## 2019-07-21 PROCEDURE — 84132 ASSAY OF SERUM POTASSIUM: CPT | Performed by: STUDENT IN AN ORGANIZED HEALTH CARE EDUCATION/TRAINING PROGRAM

## 2019-07-21 PROCEDURE — 40000133 ZZH STATISTIC OT WARD VISIT

## 2019-07-21 PROCEDURE — 83735 ASSAY OF MAGNESIUM: CPT | Performed by: STUDENT IN AN ORGANIZED HEALTH CARE EDUCATION/TRAINING PROGRAM

## 2019-07-21 PROCEDURE — 97116 GAIT TRAINING THERAPY: CPT | Mod: GP

## 2019-07-21 PROCEDURE — 80048 BASIC METABOLIC PNL TOTAL CA: CPT | Performed by: STUDENT IN AN ORGANIZED HEALTH CARE EDUCATION/TRAINING PROGRAM

## 2019-07-21 PROCEDURE — 97530 THERAPEUTIC ACTIVITIES: CPT | Mod: GO

## 2019-07-21 RX ORDER — IBUPROFEN 600 MG/1
600 TABLET, FILM COATED ORAL EVERY 6 HOURS PRN
Qty: 100 TABLET | Refills: 0 | Status: SHIPPED | OUTPATIENT
Start: 2019-07-21 | End: 2020-09-29

## 2019-07-21 RX ORDER — FERROUS SULFATE 325(65) MG
325 TABLET ORAL
Qty: 60 TABLET | Refills: 3 | Status: SHIPPED | OUTPATIENT
Start: 2019-07-29 | End: 2020-09-29

## 2019-07-21 RX ORDER — AMOXICILLIN 250 MG
2 CAPSULE ORAL 2 TIMES DAILY PRN
Qty: 30 TABLET | Refills: 0 | Status: SHIPPED | OUTPATIENT
Start: 2019-07-21 | End: 2020-09-29

## 2019-07-21 RX ORDER — LEVOFLOXACIN 250 MG/1
250 TABLET, FILM COATED ORAL DAILY
Qty: 3 TABLET | Refills: 0 | Status: SHIPPED | OUTPATIENT
Start: 2019-07-21 | End: 2020-09-29

## 2019-07-21 RX ORDER — ACETAMINOPHEN 325 MG/1
650 TABLET ORAL EVERY 4 HOURS PRN
Qty: 100 TABLET | Refills: 0 | Status: SHIPPED | OUTPATIENT
Start: 2019-07-21 | End: 2020-09-29

## 2019-07-21 RX ORDER — HYDROMORPHONE HYDROCHLORIDE 2 MG/1
2-4 TABLET ORAL EVERY 6 HOURS PRN
Qty: 12 TABLET | Refills: 0 | Status: SHIPPED | OUTPATIENT
Start: 2019-07-21 | End: 2020-09-29

## 2019-07-21 RX ADMIN — PREDNISOLONE ACETATE 1 DROP: 10 SUSPENSION/ DROPS OPHTHALMIC at 13:17

## 2019-07-21 RX ADMIN — ONDANSETRON 4 MG: 2 INJECTION INTRAMUSCULAR; INTRAVENOUS at 10:17

## 2019-07-21 RX ADMIN — KETOROLAC TROMETHAMINE 1 DROP: 4 SOLUTION/ DROPS OPHTHALMIC at 08:20

## 2019-07-21 RX ADMIN — ONDANSETRON 4 MG: 2 INJECTION INTRAMUSCULAR; INTRAVENOUS at 01:16

## 2019-07-21 RX ADMIN — ACETAMINOPHEN 650 MG: 325 TABLET, FILM COATED ORAL at 12:32

## 2019-07-21 RX ADMIN — HYDROMORPHONE HYDROCHLORIDE 4 MG: 2 TABLET ORAL at 08:19

## 2019-07-21 RX ADMIN — PREDNISOLONE ACETATE 1 DROP: 10 SUSPENSION/ DROPS OPHTHALMIC at 08:20

## 2019-07-21 RX ADMIN — HYDROMORPHONE HYDROCHLORIDE 4 MG: 2 TABLET ORAL at 00:06

## 2019-07-21 RX ADMIN — KETOROLAC TROMETHAMINE 1 DROP: 4 SOLUTION/ DROPS OPHTHALMIC at 13:17

## 2019-07-21 ASSESSMENT — ACTIVITIES OF DAILY LIVING (ADL)
ADLS_ACUITY_SCORE: 14

## 2019-07-21 NOTE — PLAN OF CARE
Discharge Planner OT   Patient plan for discharge: home with assist PRN  Current status: Ambulated ~150' in hallway with SBA. Completed standing ADLs at sink with SBA. Able to recall precautions and demo'd understanding.   Barriers to return to prior living situation: post-surgical precautions, pain, deconditioning  Recommendations for discharge: home with assist   Rationale for recommendations: Pending continued progress in therapy, pt will be able to safely discharge home with assist from family PRN       Entered by: Dima Fuller 07/21/2019 12:01 PM

## 2019-07-21 NOTE — PLAN OF CARE
Alert and ambulating in stone at beginning of shift. Dilaudid 4mg given x1 for abdominal pain with good  pain management. Tolerating diet. Zofran given x1 with good effect. Midline incision clean, dry and intact with slight erythema. Good output from both urostomy and ileostomy. Discharged to home. PICC line removed. Discharge instructions and medications reviewed. Written instructions for urostomy care and cleaning overnight bag given to patient. Supplies for home and info for follow-up care and contact numbers included in discharge packet.

## 2019-07-21 NOTE — PROGRESS NOTES
"Urology  Progress Note    - Remaining stent and LEANDRO drain removed yesterday morning  - TPN  last night and mIVF decreased  - NAEON  - AF, VSS  - Some discomfort in abdomen but pain controlled  - Does not have a big appetite but eating and no N/V    Exam  /56 (BP Location: Left arm)   Pulse 99   Temp 97.6  F (36.4  C) (Oral)   Resp 18   Ht 1.473 m (4' 10\")   Wt 76.3 kg (168 lb 4.8 oz)   SpO2 95%   BMI 35.17 kg/m    No acute distress  Unlabored breathing on room air  Abdomen soft, mildly distended, minimally tender. L abdominal ileostomy pink and healthy w/ green liquid stool. Laparotomy incision C/D/I with staples  R abdominal urostomy with stoma pink and viable. No stents. Clear yellow urine in bag with some mucous.   LEANDRO site with dressing c/d/i (drain now removed)    UOP 3175 / 675  Stool: 475 / 250    Labs  AM labs pending    UCx   10K-50K Pseudomonas, 10K-50K Enterococcus (sensitive to levaquin)  UCx   Negative  Urine fungal   Negative    Assessment/Plan  49 year old y/o female POD#11 s/p ileal conduit takedown, CATRINA, looposcopy, ureterolysis, and creation of new ileal conduit for stomal stenosis and symptomatic L hydro c/b post-op ileus.     Neuro: PRN tylenol, prn ibuprofen, prn PO dilaudid, prn flexeril.  CV: HDS. LE US  negative for DVT.   Pulm: incentive spirometry while awake  FEN/GI: Regular, stop MIVF; PTN stopped ; Remove PICC today  Endo: DIRK   : Monitor urine output. LEANDRO removed . Staple removal today.  Heme/ID: Hgb pending. Afebrile. Repeat urine culture negative. Levaquin added , continue for 2 days upon discharge  Activity: Up ad antonella. Encourage ambulation. HOB > 30 degrees while in bed.  PPx: SCDs, ambulation   Dispo: [facility] to assist with stoma cares. Home with assist per PT/OT. Discharge today    Seen and evaluated with chief resident and staff Dr. Ojeda.    Chip Ronquillo MD  Urology PGY-2         Contacting the Urology Team     Please use the " following job codes to reach the Urology Team. Note that you must use an in house phone and that job codes cannot receive text pages.     On weekdays, dial [phone] (or star-star-star 777 on the new [email or web address] telephones) then [phone] to reach the Adult Urology resident or PA on call    On weekdays, dial [phone] (or star-star-star 777 on the new [email or web address] telephones) then 0818 to reach the Pediatric Urology resident    On weeknights and weekends, dial [phone] (or star-star-star 777 on the new [email or web address] telephones) then [phone] to reach the Urology resident on call (for both Adult and Pediatrics)

## 2019-07-21 NOTE — PLAN OF CARE
Vital signs:  Temp: 97.6  F (36.4  C) Temp src: Oral BP: 116/56 Pulse: 99   Resp: 18 SpO2: 95 % O2 Device: None (Room air)     Activity: Up with SBA.   Neuros: A&O x4.   Cardiac: WDL ex tachy at times.   Respiratory: WDL on RA. Denies SOB.   GI/: Urostomy with adequate UOP. Colostomy intact.   Diet: Regular.   Skin: Pubic area swollen.   Lines: Right dbl lumen PICC with NS @50mL/hr. TPN bag ran out, TPN discontinued.   Incisions/Drains: Midline incision with staples, KYLE, no drainage. Old LEANDRO site dressing c/d/i.    Labs: .  Pain/nausea: PO dilaudid given x2, tylenol given x1. Compazine given x1, zofran given x1.   Plan: Possible discharge home with [facility].

## 2019-07-21 NOTE — DISCHARGE SUMMARY
Discharge Summary     Karo Lindsay MRN# [id number]   YOB: 1970 Age: 49 year old     Date of Admission:  7/10/2019  Date of Discharge::  7/21/2019  2:20 PM  Admitting Physician:  Jaylyn Ojeda MD  Discharge Physician:  Chip Ronquillo MD  Primary Care Physician:         Jamey Adame          Admission Diagnoses:   Stenosis Of Ileal Conduit Stoma  S/P ileal conduit (H)  Ileal conduit stomal stenosis          Discharge Diagnosis:   Same as above  Non-severe malnutrition in the context of acute illness  Post-operative ileus  Acute cystitis         Procedures:   7/10/19 - Dr. Causey (Urology): Procedure(s):  Take Down Of Urinary Conduit,Creation of a New Conduit ,Looposcopy, Extensive Lysis of Adhesions and Left Ureteral Stent Exchange        Non-operative procedures:   PICC line placement          Consultations:   OCCUPATIONAL THERAPY ADULT IP CONSULT  PHYSICAL THERAPY ADULT IP CONSULT  MEDICATION HISTORY IP PHARMACY CONSULT  VASCULAR ACCESS CARE ADULT IP CONSULT         Imaging Studies:     Results for orders placed or performed during the hospital encounter of 07/10/19   XR Abdomen Port 1 View    Narrative    Exam: XR ABDOMEN PORT 1 VW, 7/10/2019 8:19 PM    Indication: lost needle    Comparison: None    Findings:   Supine AP views of the abdomen. NG/OG tube tip and sidehole project  over the stomach. Analgesia catheter projects over the spine. Left  nephroureterostomy stent. Surgical drain projects over the right  pelvis. Abnormal morphology of the pelvis, with diastases of the pubic  symphysis. Wire projects over the low left pelvis. Multiple surgical  clips in the right pelvis.      Impression    Impression: No unaccounted for radiopaque foreign body in the abdomen.    I have personally reviewed the examination and initial interpretation  and I agree with the findings.    DREW MONTANO MD   XR Chest Port 1 View    Narrative    EXAM: XR CHEST  PORT 1 VW  7/12/2019 11:35 PM      HISTORY: sepsis w/u    COMPARISON: 7/9/2019    FINDINGS: Supine AP radiograph of the chest. Right PICC line tip  projects over the superior cavoatrial junction. Partially visualized  left nephroureteral stent. Vertically oriented line over the right  hemithorax which loops over the right base is similar to prior.    The projection is slightly rotated. The trachea is midline. The  cardiac silhouette is within normal limits. Small/moderate right  pleural effusion with left basilar opacities. Streaky left perihilar  opacities suggesting atelectasis. The left pleural effusion. No  pneumothorax. Upper abdomen is unremarkable..       Impression    IMPRESSION:   1. New moderate right pleural effusion with right basilar opacity  suggesting atelectasis and/or infection.  2. Left perihilar atelectasis.    I have personally reviewed the examination and initial interpretation  and I agree with the findings.    MARIE FALK MD   US Lower Extremity Venous Duplex Bilateral    Narrative    EXAMINATION: DOPPLER VENOUS ULTRASOUND OF BILATERAL LOWER EXTREMITIES,  7/14/2019 12:45 PM     COMPARISON: None.    HISTORY: Persistent tachycardia and mild fevers, shortness of breath    TECHNIQUE:  Gray-scale evaluation with compression, spectral flow and  color Doppler assessment of the deep venous system of both legs from  groin to knee, and then at the ankles.    FINDINGS:  In both lower extremities, the common femoral, femoral, popliteal and  posterior tibial veins demonstrate normal compressibility and blood  flow.      Impression    IMPRESSION:  1.  No evidence of deep venous thrombosis in either lower extremity.    I have personally reviewed the examination and initial interpretation  and I agree with the findings.    MARIE FALK MD   XR Chest Port 1 View    Narrative    Exam: XR CHEST PORT 1 VW, 7/14/2019 4:56 PM    Indication: R/o pneumonia vs. Atelectasis    Comparison: Chest radiograph  7/12/2019    Findings:   Right arm PICC tip terminates at the cavoatrial junction.  Cardiomediastinal silhouette is within normal limits. Pulmonary  vasculature is distinct. Bibasilar and left perihilar opacities. In  the left perihilar area, there are sutures/staples. Low lung volumes.  Small bilateral pleural effusions. No pneumothorax. Partially  visualized left nephroureteral stent. Skin staples visualized in the  mid abdomen. Paravertebral anesthesia catheter.      Impression    Impression:   1. Postoperative changes of the left upper lobe resection, with  atelectasis along the periareolar major fissure.  2. Small bilateral pleural effusions with overlying subsegmental  atelectasis/consolidations. Favor atelectasis given the symmetry,  however infection is not excluded.    I have personally reviewed the examination and initial interpretation  and I agree with the findings.    SHANA ZEPEDA MD   XR Abdomen Port 1 View    Narrative    Exam: XR ABDOMEN PORT 1 VW, 7/16/2019 4:31 PM    Indication: New abd pain, decreased ostomy output    Comparison: 7/10/2019    Findings:   Supine frontal views of the abdomen. Left nephroureteral stent  extending into a right lower quadrant ileal conduit. Pelvic surgical  drain. Right upper quadrant surgical clips. Air distended, nondilated  loops of small bowel in the left midabdomen. Otherwise paucity of  small and large bowel gas. Mildly air distended stomach. No  pneumatosis or portal venous gas. Low lung volumes with streaky  perihilar and medial bibasilar opacities, favored to represent  atelectasis. Left upper lobe resection changes. Right upper extremity  PICC tip at the mid right atrium. Midline skin staples.      Impression    Impression:   1. Nonspecific paucity of bowel gas, though a couple air-filled loops  of small bowel in the left midabdomen are not dilated.  2. Stable position of a left ureteral stent extending into a urostomy.    ARMANI CUNHA, DO   XR Abdomen  Port 1 View    Narrative    Exam: Abdominal x-ray, supine views, 7/17/2019.    COMPARISON: 7/16/2019.    HISTORY: Vomiting, concern for ileus.    FINDINGS: Supine views of the abdomen were obtained. Streaky opacities  in the lung bases, likely atelectasis. Partially visualized central  line with its deep projecting over the right atrium. Stable  cardiomediastinal silhouette.    Postoperative changes with surgical clips in the pelvis, right lower  quadrant ostomy, skin staples, left nephroureteral stent, right lower  quadrant abdominal drain.    Scattered air-filled loops of small bowel in the left upper abdominal  quadrant. Mild gastric gaseous distention. No pneumatosis or portal  venous gas. Evaluation of free air is limited in the supine position.      Impression    IMPRESSION:  1. Scattered air-filled loops of small bowel in the left upper  abdominal quadrant. Mild gastric gaseous distention.   2. Postoperative changes as described above.    DEANN ALMANZA MD   CT Abdomen Pelvis w Contrast    Narrative    EXAMINATION: CT ABDOMEN PELVIS W CONTRAST, 7/17/2019 2:38 PM    TECHNIQUE:  Helical CT images from the lung bases through the  symphysis pubis were obtained with IV contrast. Oral contrast  administered. Contrast dose: iopamidol (ISOVUE-370) solution 103 mL    COMPARISON: None available    HISTORY: Nausea, bilious vomiting; 49 year old female with POD #7  ileal conduit creation and preexisting ileostomy with new emesis and  abdominal pain and decreased ileostomy outputs. Evaluate for  obstruction vs anastomotic leak.    FINDINGS:    Combination of findings of cloacal exstrophy with recent postoperative  changes of urinary conduit with urostomy. Ureteral stent extending  through the urinary conduit into the left ureter and terminating in  the superior pole calyx. There is no hydronephrosis, although there is  mild urothelial enhancement we have adjacent fat stranding extending  up to the level off the ileal  conduit lead.    The pelvic organs are absent with open-book formation of the pelvis.  The mesentery is herniated into the patient's vagina. There is  significant amount of mesenteric stranding predominantly in the low  abdomen with a small fluid collection in the right lower quadrant  measuring 1.9 x 2.9 cm on series 5, image 356. Left anterior approach  drain in place terminating in the right mid abdomen does not connect  with this fluid collection.     There are multiple dilated loops of small bowel predominantly in the  left mid abdomen. There is mild wall thickening within the distal  portion of involved bowel with developing feculent material. There is  also small amount of ascites anterior these loops of bowel. Wall  thickening measuring up to 5 mm. There is no pneumatosis or portal  venous gas. There is no abrupt transition point with relatively  normal-appearing distal ileum terminating in the left lower quadrant  ileostomy.     There is a small amount of mildly hyperattenuating material in the  right paracolic gutter extending into the right pelvis on series 5,  image 367 with Hounsfield units reaching 37. The distal tip of the  right lobe of the liver extends down into this fluid collection.    There is an area of soft tissue density just above the herniated  mesentery measuring 3.5 x 3.5 cm on series 5, image 114. This is  likely previous postoperative change. There is a metallic clip located  at the level of the low perineum on the left side on series 5, image  401.    Moderate to severe subcutaneous edema in the labia majora extending  into the mons pubis and up towards the new low midline laparotomy.  There is skin thickening in this region.    Liver: Normal parenchymal attenuation without focal mass.  Biliary system: Gallbladder is within normal limits. No intrahepatic  or extrahepatic biliary ductal dilatation.  Pancreas: No focal mass or dilation of the main pancreatic duct.  Stomach: Within normal  limits.  Spleen: Within normal limits.  Adrenal glands: Thickening of bilateral adrenal glands with no  discrete nodule.  Kidneys: Right renal agenesis. Hypoattenuating lesions throughout the  left kidney, likely simple renal cysts. No solid mass or stone.  Colon: Not visualized  Lymph nodes: Enlarged gastrohepatic and retroperitoneal lymph nodes  measuring 1.8 and 1.4 cm, respectively on series 5, image 174. These  are likely reactive.  Vasculature: The major intra-abdominal vasculature is patent. Aorta is  normal in caliber. 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    Lung bases: There is a soft tissue density at the anterior aspect of  the abruptly terminating lingular bronchi as demonstrated on series 5,  image 1. There is surrounding hyperattenuating linear material that  extends anteriorly to the pleural surface. Vascular clip just proximal  to this at the hilum. Small right and trace left-sided pleural  effusion. Mild amount of consolidation predominantly in the right  lower lobe.    Bones: Spina bifida at the level of the sacrum with a cystic structure  extending posteriorly out of the defect measuring 2.6 x 2.5 cm on  series 5, image 346. There is flattening of the typical sacroiliac  joint angle as well as the associated osseous structures. As above,  there is a wide open book type defect spanning approximately 15.5 cm  of the pubic symphysis.      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 Findings most suggestive of postoperative ileus, however there is  wall thickening in the distal aspect of the dilated loops of bowel  suggesting an ileitis. No late findings of ischemia or focal  transition point.  2. There is a moderate amount of stranding within the mesentery with 2  small fluid collections, one of which appears to have blood in it  extending down into the right pelvis.  Intra-abdominal drain does not  connect with these fluid collections. These findings are likely  postoperative.  3. External ureteral stent with tip terminating in the superior pole  of the solitary left kidney. Mild urothelial enhancement without  significant hydronephrosis. This is nonspecific and may be related to  irritation from ureteral stent as well as inflammatory changes however  ascending infection cannot be excluded. Correlate with urinalysis.  4. Incidentally noted duplicated IVC.  5. Findings most suggestive of lingular wedge resection, although  these findings are incompletely visualized. Recommend correlation with  outside operative reports and imaging studies. Otherwise dedicated  chest CT could be used for further evaluation.  6. Spina bifida at the level of the sacrum with cystic structure  extending posteriorly through the osseous defect. This may be a  meningocele. Involvement of the nerve roots cannot be evaluated on  this examination. Recommend correlation with outside studies if  available.  7. Bilateral small right and trace left pleural effusions with  overlying atelectasis versus consolidation.    I have personally reviewed the examination and initial interpretation  and I agree with the findings.    DEANN ALMANAZ MD            Medications Prior to Admission:     No medications prior to admission.            Discharge Medications:     Discharge Medication List as of 7/21/2019 12:05 PM      START taking these medications    Details   ferrous sulfate (FEROSUL) 325 (65 Fe) MG tablet Take 1 tablet (325 mg) by mouth Every Mon, Wed, Fri Morning Start this medication one week from discharge from hospital (7/29/19)., Disp-60 tablet, R-3, E-Prescribe      HYDROmorphone (DILAUDID) 2 MG tablet Take 1-2 tablets (2-4 mg) by mouth every 6 hours as needed for severe pain, Disp-12 tablet, R-0, Local Print      ibuprofen (ADVIL/MOTRIN) 600 MG tablet Take 1 tablet (600 mg) by mouth every 6 hours as  needed for moderate pain, Disp-100 tablet, R-0, E-Prescribe      levofloxacin (LEVAQUIN) 250 MG tablet Take 1 tablet (250 mg) by mouth daily, Disp-3 tablet, R-0, E-Prescribe      senna-docusate (SENOKOT-S/PERICOLACE) 8.6-50 MG tablet Take 2 tablets by mouth 2 times daily as needed for constipation (Take as needed for constipation while taking narcotic pain medications. Stop if elevated ileostomy output (>2 L / 24 hrs) occurs.), Disp-30 tablet, R-0, Local Print         CONTINUE these medications which have CHANGED    Details   acetaminophen (TYLENOL) 325 MG tablet Take 2 tablets (650 mg) by mouth every 4 hours as needed for mild pain or fever, Disp-100 tablet, R-0, E-Prescribe         CONTINUE these medications which have NOT CHANGED    Details   ketorolac tromethamine (ACULAR-LS) 0.4 % SOLN ophthalmic solution Place 1 drop into both eyes 3 times daily , Historical      LORazepam (ATIVAN) 0.5 MG tablet Take 0.5 mg by mouth as needed (Uses for flying) , Historical      prednisoLONE acetate (PRED FORTE) 1 % ophthalmic suspension USE 1 DROP BOTH  EYES TID., Historical      rizatriptan (MAXALT) 10 MG tablet Take by mouth as needed, Historical                    Brief History of Illness:   Reason for admission requiring a surgical or invasive procedure:   Stenosis Of Ileal Conduit Stoma   The patient underwent the following procedure(s):   See above   There were no immediate complications during this procedure.    Please refer to the full operative summary for details.           Hospital Course:   Overnight on POD#0-#1 the patient was noted to be hypotensive and tachycardic. She triggered the sepsis protocol resulting in a lab draw revealing a lactate of 6.9. Consequently a rapid response was called. Aggressive fluid resuscitation resulted in normalization of her blood pressure and improvement of her lactic acidosis. Her pos-operative pain was initially controlled with an epidural. She was noted to have a fever of 102 on  POD#2 and was pan-cultured. Overnight into POD#3 her Hgb down-trended to 6.8 overnight and she was administered 1 U PRBC. Blood cultures remained negative but her urine culture grew 10K-50K Pseudomonas, 10K-50K Enterococcus (sensitive to levaquin). Levaquin was started on 7/14/19. On POD#4 due to the presence of persistent tachycardia, mild fevers, and shortness of breath she underwent bilateral lower extremity ultrasound, which was negative for DVT. Her epidural was removed on POD#5. She developed decreased ileostomy output and crampy abdominal pain on POD#6. Imaging, including KUB and CT, revealed evidence of ileus. She was made NPO and given IVF. She had nausea and vomiting. She did not require an NG tube. Due to malnutrition a PICC was placed and TPN was started. As she had gradual return of ileostomy function her diet was slowly advanced in a stepwise fashion. TPN was allowed to . Repeat urine cultures were negative. During her stay her ureteral stents were removed as well as her surgical drain. On day of discharge her midline incision staples were removed as was her PICC line. PT and OT had initially recommended TCU upon discharge but the patient continued working with therapies during her stay such that they ultimately recommended home with assist. WOCN met with the patient on multiple occasions for stoma teaching. On POD#11 patient was ambulating without assitance, tolerating a regular diet, had pain controlled with PO medications to go home with, and requiring no IV medications or fluids. Patient was discharged home with appropriate contact information, follow-up and instructions as seen below in the discharge paperwork.         Final Pathology Result:   Pending at time of discharge         Discharge Instructions and Follow-Up:     Discharge Procedure Orders   Home care nursing referral   Referral Priority: Routine Referral Type: Home Health Therapies & Aides   Number of Visits Requested: 1     Reason  for your hospital stay   Order Comments: You were in the hospital for ileal conduit revision surgery.     Adult [facility] Follow-up and recommended labs and tests   Order Comments: Follow up with Urology for post-op visit is scheduled on 8/13/19 at 12:00 pm. Please keep this appointment.    Appointments on [place] and/or [facility] (with [facility] or [facility] provider or service). Call [phone] if you haven't heard regarding these appointments within 7 days of discharge.     Activity   Order Comments: Your activity upon discharge: Per discharge instructions     Order Specific Question Answer Comments   Is discharge order? Yes      When to contact your care team   Order Comments: Per discharge instructions     Diet   Order Comments: Follow this diet upon discharge: Regular Diet Adult     Order Specific Question Answer Comments   Is discharge order? Yes             Discharge Disposition:     Discharged to Home      Condition at discharge: Good    --    Chip Ronquillo MD  Urology Resident    11:46 AM, 7/21/2019

## 2019-07-22 ENCOUNTER — PATIENT OUTREACH (OUTPATIENT)
Dept: UROLOGY | Facility: CLINIC | Age: 49
End: 2019-07-22

## 2019-07-22 NOTE — PROGRESS NOTES
[facility]     Name: Karo Lindsay : 1970  Date: 2019    To order your ostomy supplies    The ostomy Supplier needs this supply list  to process your order. You will need to fax/deliver this list, along with your Insurance information. Your home care nurse can assist with this process.             List of Ostomy Distributors        [employer]  Ph. [phone] Fax # [phone]  [facility] [employer]   Ph. [phone]  [employer]   Ph. [phone]  [facility]   Ph. [phone]  Or Call your insurance provider for their preferred supplier    Your Medical Supplier will need your surgeon's name, phone and fax number    Clinic:                     Phone                            Fax  Urology Surgery:              [phone]    Verbal Order for ostomy supplies for 1 Month per:       Vonda Cameron RN, BSN, CWON    Authorizing MD: Akhil Causey MD    Change pouch : twice a week                              Quantity of pouches- 20 pouches/month    Request the following supplies:      Nathalia    2 piece flat wafer 57mm  # [id number]     Urine pouch 57mm- # [id number]      Accessories  Adapt powder #[id number]    No sting film barrier # [id number]                          Bard urine drainage bag #[id number]                         Coloplast leg bag #[id number]              Change your pouch twice a week, more often if leaking.    If you are cutting a hole in the wafer of your pouch, recheck stoma size and adjust pouch opening as needed every week    . Call the Ostomy Nurse at [facility]       [address] [place] [address] : [phone]   Schedule a follow-up visit in 4 to 6 weeks after your surgery, sooner if having problems Bring a complete set of pouch-changing supplies to this visit      Problems you should Report  - The stoma turns blue or darker in color.  - Cuts or sores around the  stoma.  - Red, raw or painful skin around the stoma.  - Any bulging of the skin around the stoma.  - A pouch that leaks every day.  - Problems making the right size hole in the pouch wafer.    Please call with any questions or concerns.     Spoke with Urology FRANCISCO Larry who will sign script and send to patient's preferred medical supply distributor.    Vonda Cameron RN, BSN, CWON

## 2019-07-22 NOTE — PROGRESS NOTES
Urology Postop Phone Note:    Ms. Karo Lindsay is a 49 year old female who underwent Take Down Of Urinary Conduit,Creation of a New Conduit ,Looposcopy, Extensive Lysis of Adhesions and Left Ureteral Stent Exchange on 7/10/19 with Dr. Causey for a history of urinary conduit stricture.  Her postoperative course was unremarkable and the patient was d/c to home on 7/21/19.      Fevers/chills: Patient denies any fever or chills.  Eating/drinking: Patient reports a lack of appetite.  Bowel habits: Patient reports having a normal bowel movement.  Urine output Artis catheter Color Yellow Clarity Clear Odor None  Incisions: Patient denies any signs and symptoms of infection.  Pain: Patient reports pain as a 5 out of 10.  The pain is located at incision site.  Narcotics: The patient has been taking Tylenol 500 mg BID q6h and Dilaudid 2 mg at bedtime for pain medication and is able to control the pain.  Antibiotics: No    Follow up appointment scheduled on 8/13/19 at 1200 with Natalya Butts PA-C.    Informed the patient of the clinic triage nursing # ([phone]) and urology resident on call # for after hours concerns.    Vivien Sloan, RN, BSN  Care Coordinator - Reconstructive Urology

## 2019-08-02 ENCOUNTER — TELEPHONE (OUTPATIENT)
Dept: UROLOGY | Facility: CLINIC | Age: 49
End: 2019-08-02

## 2019-08-02 NOTE — TELEPHONE ENCOUNTER
TRIAGE CALL   HX:   S/P 7/10/19 - Dr. Causey (Urology): Procedure(s):  Take Down Of Urinary Conduit,Creation of a New Conduit ,Looposcopy, Extensive Lysis of Adhesions and Left Ureteral Stent Exchange    [facility] She reports site open, slightly red & swollen, NO drainage, NO pain.  She request instructions for care of site     PLAN: Per request sent message to [facility]OL CLIN[facility]/ return call to Tiffany @    [phone].

## 2019-08-02 NOTE — TELEPHONE ENCOUNTER
Called patient regarding his open area on her abdomen  She states that there is no drainage at all no pain just slightly open Cristel Bell LPN Staff Nurse

## 2019-08-05 ENCOUNTER — TELEPHONE (OUTPATIENT)
Dept: UROLOGY | Facility: CLINIC | Age: 49
End: 2019-08-05

## 2019-08-05 NOTE — TELEPHONE ENCOUNTER
Patient called and stated the opening over the area that the penrose drain was in is still open but not worse  No other symptoms at this time.  But concerned that her bag for the stoma lays on top of this area hard for it to breathe .----- Message from Cristel Bell LPN sent at 8/2/2019  4:19 PM CDT -----  Call her about pen marjorie drain hole see if bigger Cristel Bell LPN Staff Nurse

## 2019-08-13 ENCOUNTER — OFFICE VISIT (OUTPATIENT)
Dept: UROLOGY | Facility: CLINIC | Age: 49
End: 2019-08-13
Payer: COMMERCIAL

## 2019-08-13 VITALS
WEIGHT: 143 LBS | HEART RATE: 118 BPM | SYSTOLIC BLOOD PRESSURE: 126 MMHG | HEIGHT: 59 IN | DIASTOLIC BLOOD PRESSURE: 79 MMHG | BODY MASS INDEX: 28.83 KG/M2

## 2019-08-13 DIAGNOSIS — R11.0 NAUSEA: ICD-10-CM

## 2019-08-13 DIAGNOSIS — Z98.890 HISTORY OF URINARY DIVERSION PROCEDURE: Primary | ICD-10-CM

## 2019-08-13 PROBLEM — N13.30 HYDRONEPHROSIS: Status: ACTIVE | Noted: 2019-08-13

## 2019-08-13 PROBLEM — E66.9 OBESITY, UNSPECIFIED: Status: ACTIVE | Noted: 2019-08-13

## 2019-08-13 PROBLEM — C7A.090: Status: ACTIVE | Noted: 2018-06-09

## 2019-08-13 PROBLEM — N99.534: Status: ACTIVE | Noted: 2019-06-05

## 2019-08-13 PROBLEM — N20.0 CALCULUS OF KIDNEY: Status: ACTIVE | Noted: 2019-08-13

## 2019-08-13 PROBLEM — R79.89 OTHER SPECIFIED ABNORMAL FINDINGS OF BLOOD CHEMISTRY: Status: ACTIVE | Noted: 2019-08-13

## 2019-08-13 PROBLEM — N39.0 RECURRENT URINARY TRACT INFECTION: Status: ACTIVE | Noted: 2018-07-11

## 2019-08-13 ASSESSMENT — MIFFLIN-ST. JEOR: SCORE: 1174.27

## 2019-08-13 ASSESSMENT — PAIN SCALES - GENERAL: PAINLEVEL: NO PAIN (0)

## 2019-08-13 NOTE — PATIENT INSTRUCTIONS
UROLOGY CLINIC VISIT PATIENT INSTRUCTIONS    1) Follow up with Dr. Causey or Dr. Najera in 2 months with a kidney ultrasound beforehand.    2) Please get your blood drawn at your primary care doctor's office tomorrow and ask them to fax results to us (the fax number is listed on the order for the blood work that was sent with you today).   -We will then contact you with any additional recommendations based on the results.    3) Continue to drink plenty of water and try your best to eat a healthy, balanced diet. Supplement with Boost or Ensure nutrition shakes as needed.   -It is okay to continue using Zofran as needed to help with the nausea.   -Hopefully the farther out from surgery you get, the more this will improve.     4) Try to keep your ileostomy appliance from covering the opening where the drain was. Keep this open to air as much as possible or covered with a gauze pad or bandaid to keep your clothes from getting soiled. This should continue to heal over time but may take a few weeks.     If you have any issues, questions or concerns in the meantime, do not hesitate to contact us at [phone] or via [employer].     It was a pleasure meeting with you today.  Thank you for allowing me and my team the privilege of caring for you today.  YOU are the reason we are here, and I truly hope we provided you with the excellent service you deserve.  Please let us know if there is anything else we can do for you so that we can be sure you are leaving completely satisfied with your care experience.

## 2019-08-13 NOTE — LETTER
8/13/2019       RE: Karo Lindsay  [address] [id number]     Dear Colleague,    Thank you for referring your patient, Karo Lindsay, to the [facility] UROLOGY AND INST FOR PROSTATE AND UROLOGIC CANCERS at [facility]. Please see a copy of my visit note below.    Urology Post-op Visit    DATE: 7/10/19  SURGEON: Dr. Akhil Causey  PROCEDURE(S):   1. Takedown of prior ileal conduit  2. Looposcopy  3. Lysis of bowel adhesions  4. Ureterolysis  5. Creation of new ileal conduit      HPI: Ms. Karo Lindsay is a 49 year old female who is 5 weeks s/p ileal conduit takedown, CATRINA, looposcopy, ureterolysis, and creation of new ileal conduit for a history of stomal stenosis and symptomatic left hydronephrosis. Her post-operative course was complicated by ileus with N/V which required PICC line for TPN. This was discontinued prior to discharge. Ureteral stents, surgical drain, and incision staples were also removed prior to discharge.    She returns today for her first post-operative check. For the first four days that she was home, she felt well with no apparent issues. She then developed nausea and decreased appetite which has been persistent. She has been drinking well and is thirsty but has no appetite for food as nothing sounds appealing. She has vomited once since returning home on 7/21/19. Does not feel to be losing a significant amount of weight. She has tried supplementing with nutrition shakes but finds these too sweet. Has also been using Zofran PRN which does help some. She otherwise denies pain. UOP has been good. Ileostomy functioning normally with good output. The site where her surgical drain was removed has remained slightly open and bleeds on occasion. She denies pain over the site, surrounding redness, or purulent drainage. She was trying to keep this open to air initially, but now her ileostomy appliance adhesive has been covering it. She also denies  "fevers or chills.    Saw her PCP on 7/29/19 where Cr was noted to be slightly up from discharge (0.88 --> 1.37).      PEx  /79   Pulse 118   Ht 1.499 m (4' 11\")   Wt 64.9 kg (143 lb)   BMI 28.88 kg/m     GEN: well-appearing female in NAD  RESP: no increased respiratory effort  ABD: soft, mildly tender inferior to right abdominal urostomy, ND. Midline incision healing well without erythema or fluctuance. Clear yellow urine in bag with some mucous. Ileostomy in left abdomen with greenish-brown liquid stool. Site of prior LEANDRO drain inferior to ileostomy remains open with some pink mucosa visible but no bleeding, discharge, tenderness, fluctuance, or erythema.     Labs:  Cr   1.37   7/29/19   Cr   0.88   7/21/19    A/P   49 year old female who is 5 weeks s/p ileal conduit takedown, CATRINA, looposcopy, ureterolysis, and creation of new ileal conduit for a history of stomal stenosis and symptomatic left hydronephrosis.  -Doing well aside from persistent nausea and decreased appetite. She is drinking normally and tolerating PO without vomiting, but nothing sounds appealing and she does not feel hungry. Not losing any significant amount of weight.  -Recommend that she continue to eat small, frequent meals during the day and supplement with nutrition shakes.   -Continue to stay well hydrated as she has been doing.  -Zofran PRN as prescribed by PCP.  -Given slightly increased serum Cr with PCP on 7/29, will also plan to repeat BMP to see how kidney function is trending. If this remains elevated, may want to consider updating kidney ultrasound to check for recurrence of hydronephrosis (none seen on CT from 7/17).   -Patient requests to have blood work done through her primary clinic tomorrow as she is a hard stick and not well hydrated today. Order for BMP sent with patient; results to be faxed for review.   -Recommend to not cover the former LEANDRO drain site with ileostomy appliance adhesive. Keep open to air or covered " with gauze until healed. Can apply bacitracin ointment 1-2 times per day.    Additional recommendations pending results of updated blood work.    Otherwise, follow up with Dr. Causey in 2 months with a renal ultrasound beforehand.     Natalya Butts PA-C  Department of Urology

## 2019-08-13 NOTE — PROGRESS NOTES
"Urology Post-op Visit    DATE: 7/10/19  SURGEON: Dr. Akhil Causey  PROCEDURE(S):   1. Takedown of prior ileal conduit  2. Looposcopy  3. Lysis of bowel adhesions  4. Ureterolysis  5. Creation of new ileal conduit      HPI: Ms. Karo Lindsay is a 49 year old female who is 5 weeks s/p ileal conduit takedown, CATRINA, looposcopy, ureterolysis, and creation of new ileal conduit for a history of stomal stenosis and symptomatic left hydronephrosis. Her post-operative course was complicated by ileus with N/V which required PICC line for TPN. This was discontinued prior to discharge. Ureteral stents, surgical drain, and incision staples were also removed prior to discharge.    She returns today for her first post-operative check. For the first four days that she was home, she felt well with no apparent issues. She then developed nausea and decreased appetite which has been persistent. She has been drinking well and is thirsty but has no appetite for food as nothing sounds appealing. She has vomited once since returning home on 7/21/19. Does not feel to be losing a significant amount of weight. She has tried supplementing with nutrition shakes but finds these too sweet. Has also been using Zofran PRN which does help some. She otherwise denies pain. UOP has been good. Ileostomy functioning normally with good output. The site where her surgical drain was removed has remained slightly open and bleeds on occasion. She denies pain over the site, surrounding redness, or purulent drainage. She was trying to keep this open to air initially, but now her ileostomy appliance adhesive has been covering it. She also denies fevers or chills.    Saw her PCP on 7/29/19 where Cr was noted to be slightly up from discharge (0.88 --> 1.37).      PEx  /79   Pulse 118   Ht 1.499 m (4' 11\")   Wt 64.9 kg (143 lb)   BMI 28.88 kg/m    GEN: well-appearing female in NAD  RESP: no increased respiratory effort  ABD: soft, mildly tender inferior " to right abdominal urostomy, ND. Midline incision healing well without erythema or fluctuance. Clear yellow urine in bag with some mucous. Ileostomy in left abdomen with greenish-brown liquid stool. Site of prior LEANDRO drain inferior to ileostomy remains open with some pink mucosa visible but no bleeding, discharge, tenderness, fluctuance, or erythema.     Labs:  Cr   1.37   7/29/19   Cr   0.88   7/21/19    A/P   49 year old female who is 5 weeks s/p ileal conduit takedown, CATRINA, looposcopy, ureterolysis, and creation of new ileal conduit for a history of stomal stenosis and symptomatic left hydronephrosis.  -Doing well aside from persistent nausea and decreased appetite. She is drinking normally and tolerating PO without vomiting, but nothing sounds appealing and she does not feel hungry. Not losing any significant amount of weight.  -Recommend that she continue to eat small, frequent meals during the day and supplement with nutrition shakes.   -Continue to stay well hydrated as she has been doing.  -Zofran PRN as prescribed by PCP.  -Given slightly increased serum Cr with PCP on 7/29, will also plan to repeat BMP to see how kidney function is trending. If this remains elevated, may want to consider updating kidney ultrasound to check for recurrence of hydronephrosis (none seen on CT from 7/17).   -Patient requests to have blood work done through her primary clinic tomorrow as she is a hard stick and not well hydrated today. Order for BMP sent with patient; results to be faxed for review.   -Recommend to not cover the former LEANDRO drain site with ileostomy appliance adhesive. Keep open to air or covered with gauze until healed. Can apply bacitracin ointment 1-2 times per day.    Additional recommendations pending results of updated blood work.    Otherwise, follow up with Dr. Causey in 2 months with a renal ultrasound beforehand.     Natalya Butts PA-C  Department of Urology

## 2019-08-14 ENCOUNTER — TRANSFERRED RECORDS (OUTPATIENT)
Dept: HEALTH INFORMATION MANAGEMENT | Facility: CLINIC | Age: 49
End: 2019-08-14

## 2019-08-16 ENCOUNTER — ANCILLARY PROCEDURE (OUTPATIENT)
Dept: ULTRASOUND IMAGING | Facility: CLINIC | Age: 49
End: 2019-08-16
Attending: PHYSICIAN ASSISTANT
Payer: COMMERCIAL

## 2019-08-16 ENCOUNTER — TELEPHONE (OUTPATIENT)
Dept: UROLOGY | Facility: CLINIC | Age: 49
End: 2019-08-16

## 2019-08-16 DIAGNOSIS — Z98.890 HISTORY OF URINARY DIVERSION PROCEDURE: ICD-10-CM

## 2019-08-16 NOTE — TELEPHONE ENCOUNTER
Health Call Center    Phone Message    May a detailed message be left on voicemail: yes    Reason for Call: Symptoms or Concerns     If patient has red-flag symptoms, warm transfer to triage line    Current symptom or concern: Pt complaining of severe pain over her kidney and pelvic region.  Pt is 4 week POST OP    Symptoms have been present for:  1 day(s)    Has patient previously been seen for this? Yes    By Benjamín  Date: 8/13/19    Are there any new or worsening symptoms? Yes: PAIN      Action Taken: Message routed to:  [facility] urology

## 2019-08-16 NOTE — TELEPHONE ENCOUNTER
"Patient states since yesterday having 8 out of 10 left flank pain that radiates to front of pelvis.  Patient states the pain \"feels like the pain from before surgery\".  Patient states taking Dilaudid 2 mg but it took a few hours to help get relief.  Patient is still feeling nauseous and fatigued.  Patient also mentioned that she fainted while going to the bathroom overnight, she feels it may have been due to the severe pain she's having.  BMP was done at [facility] on 8/14/19, we are currently waiting for results to be faxed over, patient read BMP results via telephone to me which include: Creatinine 1.15 mg/dL, BUN/cret ratio 27, and BUN 42.  Spoke to Dr. Najera, she would like patient to get a renal ultrasound to rule out hydronephrosis.     Vivien Sloan, RN, BSN  Care Coordinator- Reconstructive Urology    "

## 2019-08-21 DIAGNOSIS — Z98.890 HISTORY OF URINARY DIVERSION PROCEDURE: Primary | ICD-10-CM

## 2019-08-21 DIAGNOSIS — Z29.89 NEED FOR PROPHYLAXIS AGAINST URINARY TRACT INFECTION: Primary | ICD-10-CM

## 2019-08-21 RX ORDER — NITROFURANTOIN 25; 75 MG/1; MG/1
100 CAPSULE ORAL 2 TIMES DAILY
Qty: 6 CAPSULE | Refills: 0 | Status: SHIPPED | OUTPATIENT
Start: 2019-08-26 | End: 2019-08-29

## 2019-08-22 ENCOUNTER — PATIENT OUTREACH (OUTPATIENT)
Dept: UROLOGY | Facility: CLINIC | Age: 49
End: 2019-08-22

## 2019-08-22 DIAGNOSIS — N39.0 URINARY TRACT INFECTION: Primary | ICD-10-CM

## 2019-08-22 DIAGNOSIS — N39.0 URINARY TRACT INFECTION: ICD-10-CM

## 2019-08-22 LAB
ALBUMIN UR-MCNC: 100 MG/DL
APPEARANCE UR: ABNORMAL
BACTERIA #/AREA URNS HPF: ABNORMAL /HPF
BILIRUB UR QL STRIP: NEGATIVE
COLOR UR AUTO: YELLOW
GLUCOSE UR STRIP-MCNC: NEGATIVE MG/DL
HGB UR QL STRIP: ABNORMAL
KETONES UR STRIP-MCNC: NEGATIVE MG/DL
LEUKOCYTE ESTERASE UR QL STRIP: ABNORMAL
NITRATE UR QL: NEGATIVE
NON-SQ EPI CELLS #/AREA URNS LPF: ABNORMAL /LPF
PH UR STRIP: 6.5 PH (ref 5–7)
RBC #/AREA URNS AUTO: ABNORMAL /HPF
SOURCE: ABNORMAL
SP GR UR STRIP: <=1.005 (ref 1–1.03)
UROBILINOGEN UR STRIP-ACNC: 0.2 EU/DL (ref 0.2–1)
WBC #/AREA URNS AUTO: ABNORMAL /HPF

## 2019-08-22 PROCEDURE — 81001 URINALYSIS AUTO W/SCOPE: CPT | Performed by: UROLOGY

## 2019-08-22 PROCEDURE — 87086 URINE CULTURE/COLONY COUNT: CPT | Performed by: UROLOGY

## 2019-08-22 NOTE — PROGRESS NOTES
Patient calling stating she's having a low grade fever of 99.0 degrees F, chills last night, and all over body aches.  Patient states that in the past this usually signifies a kidney infection.  Patient took Tylenol last night, temperature hasn't risen. Will leave UA/UC today.  Recommended patient to keep an eye on her fever/chills, if it increases above 100 degrees F and she overall doesn't feel well then she needs to be seen.  Patient states understanding.      Vivien Sloan RN, BSN  Care Coordinator- Reconstructive Urology

## 2019-08-23 DIAGNOSIS — N39.0 URINARY TRACT INFECTION: Primary | ICD-10-CM

## 2019-08-23 LAB
BACTERIA SPEC CULT: NORMAL
SPECIMEN SOURCE: NORMAL

## 2019-08-23 RX ORDER — SULFAMETHOXAZOLE/TRIMETHOPRIM 800-160 MG
1 TABLET ORAL DAILY
Qty: 10 TABLET | Refills: 0 | Status: SHIPPED | OUTPATIENT
Start: 2019-08-23 | End: 2019-09-02

## 2019-08-27 ENCOUNTER — ANCILLARY PROCEDURE (OUTPATIENT)
Dept: GENERAL RADIOLOGY | Facility: CLINIC | Age: 49
End: 2019-08-27
Payer: COMMERCIAL

## 2019-08-27 DIAGNOSIS — Z98.890 HISTORY OF URINARY DIVERSION PROCEDURE: ICD-10-CM

## 2019-09-04 ENCOUNTER — TELEPHONE (OUTPATIENT)
Dept: UROLOGY | Facility: CLINIC | Age: 49
End: 2019-09-04

## 2019-09-04 DIAGNOSIS — Z98.890 HISTORY OF URINARY DIVERSION PROCEDURE: ICD-10-CM

## 2019-09-04 DIAGNOSIS — N31.9 NEUROGENIC BLADDER: Primary | ICD-10-CM

## 2019-09-04 DIAGNOSIS — N13.30 HYDRONEPHROSIS, UNSPECIFIED HYDRONEPHROSIS TYPE: ICD-10-CM

## 2019-09-04 NOTE — TELEPHONE ENCOUNTER
Spoke to patient about her pain/nausea and loopogram. There is no stricture of her conduit or ureterointestinal anastomosis. She did have what appears to be a dilated calyceal diverticulum on CT which may be contributing to her symptoms. Discussed with Dr. Causey and Twila. Will refer patient to Dr. Mattson for evaluation.

## 2019-09-09 ENCOUNTER — PRE VISIT (OUTPATIENT)
Dept: UROLOGY | Facility: CLINIC | Age: 49
End: 2019-09-09

## 2019-09-09 NOTE — TELEPHONE ENCOUNTER
Reason for Visit: Referred from Dr. Causey    Diagnosis: Dilated calyceal diverticulum     Orders/Procedures/Records: Records available    Contact Patient: N/A    Rooming Requirements: Normal      Alla Haji  09/09/19  10:47 AM

## 2019-09-17 ENCOUNTER — OFFICE VISIT (OUTPATIENT)
Dept: UROLOGY | Facility: CLINIC | Age: 49
End: 2019-09-17
Payer: COMMERCIAL

## 2019-09-17 VITALS
HEIGHT: 58 IN | WEIGHT: 140 LBS | DIASTOLIC BLOOD PRESSURE: 68 MMHG | HEART RATE: 108 BPM | SYSTOLIC BLOOD PRESSURE: 113 MMHG | BODY MASS INDEX: 29.39 KG/M2

## 2019-09-17 DIAGNOSIS — N28.89 CALYCEAL DIVERTICULUM: Primary | ICD-10-CM

## 2019-09-17 RX ORDER — ONDANSETRON 8 MG/1
8 TABLET, ORALLY DISINTEGRATING ORAL EVERY 8 HOURS PRN
Qty: 20 TABLET | Refills: 1 | Status: SHIPPED | OUTPATIENT
Start: 2019-09-17 | End: 2020-09-29

## 2019-09-17 RX ORDER — ONDANSETRON 8 MG/1
8 TABLET, ORALLY DISINTEGRATING ORAL
COMMUNITY
Start: 2019-08-07 | End: 2019-09-17

## 2019-09-17 ASSESSMENT — MIFFLIN-ST. JEOR: SCORE: 1144.79

## 2019-09-17 NOTE — PROGRESS NOTES
Urology Clinic    Pb Mattson MD  Date of Service: 2019     Name: Karo Lindsay  MRN: [id number]  Age: 49 year old  : 1970  Referring provider: Akhil Causey     Assessment and Plan:  Assessment:  Karo Lindsay  is a 49 year old female who is s/p ileal conduit takedown, CATRINA, looposcopy, ureterolysis, and creation of new ileal conduit on 07/10/2019.  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  After speaking with the patient, her only symptoms currently include that of nausea which has been ongoing since her most recent procedure.  She denies any pain at all.  It is unlikely that any intervention including ureteroscopy or ultrasound guided percutaneous intervention with this cyst (possible diverticulum) would be able to alleviate any of her nausea symptoms as they are likely unrelated.  Further there is greater than typical risk with instrumentation given solitary nature of her kidney.  We will however offer her a follow up CT urogram in 3 months to monitor this lesion in the kidney.  Will obtain CT urogram to better assess renal anatomy.  If develops pain, UTI, stone prior this would change the discussion and would be greater potential benefit to active management.    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 She is s/p ileal conduit takedown, CATRINA, looposcopy, ureterolysis, and creation of new ileal conduit on 07/10/2019. 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 She had a normally positioned left kidney and a right  pelvic kidney. She did well until the age of 17 years when she had trouble with recurrent UTIs and nephrolithiasis with high fevers, malaise, nausea and vomiting resulting in hospitalizations to treat half of her UTI episodes. She had multiple stone procedures. A MAG3 renogram revealed 13% function of her right kidney. A right nephrectomy was performed in 2005.      She continued to have occasional UTIs. She has been followed in Nephrology by Dr. Tucker Holder. She has been seen in Urology at [place] for loopograms ut. 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    Since her operation in July, she notes that she has had very little pain but rather her main symptom has been nausea with some vomiting and poor appetite.  She has found some relief with zofran and the past week has been better as far as her nausea goes.  She is interested in whether there is anything else that could help to relieve this nausea and if it is at all related to the cyst finding on her CT Abd/pelvis.       Review of Systems:   Pertinent items are noted in HPI or as below, remainder of complete ROS is negative.      Physical Exam:   Patient is a 49 year old  female   Vitals: There were no vitals taken for this visit.  General Appearance Adult: Alert, no acute distress, oriented  HENT: throat/mouth:normal, good dentition  Neck: No adenopathy,masses or thyromegaly  Lungs: no respiratory distress, or pursed lip breathing  Heart: No obvious jugular venous distension present  Abdomen: soft, nontender,, There is no height or weight on file to calculate BMI.  Lymphatics: No cervical or supraclavicular adenopathy  Musculoskeltal: extremities normal, no peripheral edema  Skin: no suspicious lesions or rashes  Neuro: Alert, oriented, speech and mentation normal  Psych: affect and mood normal  Gait: Normal  : deferred    Laboratory:   I personally reviewed all applicable laboratory data and went over  findings with patient  Significant for:    CBC RESULTS:  Recent Labs   Lab Test 07/21/19  0806 07/20/19  0741 07/19/19  0637 07/18/19  0635   WBC 12.8* 8.8 8.5 9.8   HGB 9.1* 9.5* 8.0* 9.7*    372 362 385        BMP RESULTS:  Recent Labs   Lab Test 07/21/19  0806 07/20/19  1051 07/20/19  0741 07/19/19  0637    137 Canceled, Test credited 137   POTASSIUM 4.2 4.0 Canceled, Test credited 3.8   CHLORIDE 105 106 Canceled, Test credited 104   CO2 23 25 Canceled, Test credited 28   ANIONGAP 9 6 Canceled, Test credited 5   * 126* Canceled, Test credited 120*   BUN 22 16 Canceled, Test credited 14   CR 0.88 0.90 Canceled, Test credited 0.80   GFRESTIMATED 76 75 Canceled, Test credited 85   GFRESTBLACK 88 87 Canceled, Test credited >90   SHAMIR 8.4* 7.9* Canceled, Test credited 7.7*       UA RESULTS:   Recent Labs   Lab Test 08/22/19  1017 07/13/19  0030   SG <=1.005 1.006   URINEPH 6.5 6.0   NITRITE Negative Negative   RBCU 2-5* 2   WBCU 25-50* 82*       CALCIUM RESULTS  Lab Results   Component Value Date    SHAMIR 8.4 07/21/2019    SHAMIR 7.9 07/20/2019    SHAMIR Canceled, Test credited 07/20/2019       INR  Recent Labs   Lab Test 07/19/19  0637 04/24/18   INR 1.05 1.0       Imaging:   I personally reviewed all applicable imaging and went over the below findings with patient.    CT A/P 7/19  IMPRESSION: In this patient with history of cloacal exstrophy status  post recent urinary conduit and urostomy formation:  1. Findings most suggestive of postoperative ileus, however there is  wall thickening in the distal aspect of the dilated loops of bowel  suggesting an ileitis. No late findings of ischemia or focal  transition point.  2. There is a moderate amount of stranding within the mesentery with 2  small fluid collections, one of which appears to have blood in it  extending down into the right pelvis. Intra-abdominal drain does not  connect with these fluid collections. These findings are likely  postoperative.  3.  External ureteral stent with tip terminating in the superior pole  of the solitary left kidney. Mild urothelial enhancement without  significant hydronephrosis. This is nonspecific and may be related to  irritation from ureteral stent as well as inflammatory changes however  ascending infection cannot be excluded. Correlate with urinalysis.  4. Incidentally noted duplicated IVC.  5. Findings most suggestive of lingular wedge resection, although  these findings are incompletely visualized. Recommend correlation with  outside operative reports and imaging studies. Otherwise dedicated  chest CT could be used for further evaluation.  6. Spina bifida at the level of the sacrum with cystic structure  extending posteriorly through the osseous defect. This may be a  meningocele. Involvement of the nerve roots cannot be evaluated on  this examination. Recommend correlation with outside studies if  available.  7. Bilateral small right and trace left pleural effusions with  overlying atelectasis versus consolidation.    Results for orders placed or performed in visit on 08/27/19   X-Ray Loopogram/nephrostogram    Narrative    EXAM: Loopogram  8/27/2019 3:29 PM     HISTORY:  Patient with solitary kidney and ileal conduit with  recurrent stricture s/p ileal conduit takedown and constriction of new  ileal conduit 7/2019      COMPARISON:  Loopogram 5/2/2019. CT 7/17/2019.    FLUOROSCOPY TIME: 1.1 minutes    FINDINGS: The  film demonstrates a nonobstructive bowel gas  pattern. There are multiple surgical clips in the right lower  quadrant. No bony abnormality. Right lower quadrant urostomy. Left  lower quadrant ostomy. Prominent sacral spinal bony dysraphism  corresponding to meningocele seen on prior CT.    After sterile catheterization of the right lower quadrant ileal  conduit with an 8 [place] feeding tube, a loopogram was performed with  100 cc of Isovue 250. A single cycle of filling and draining were  observed. The  conduit is normal in shape, position, and function. No  evidence of contrast leak. There is prompt reflux into a dilated left  ureter with unchanged moderate hydronephrosis. Right ureter is not  visualized.      Impression    IMPRESSION:   1.  Postoperative changes of right lower quadrant diverting ileostomy  without evidence of leak or stricture.  2.  Unchanged dilation of the left ureter and moderate left  hydronephrosis. Right kidney is absent.    I have personally reviewed the examination and initial interpretation  and I agree with the findings.    HAYDEN BOO MD       Scribe Disclosure:  KOSTAS, Darryl Booth MS4, am serving as a scribe to document services personally performed by Pb Mattson MD at this visit, based upon the provider's statements to me. All documentation has been reviewed by the aforementioned provider prior to being entered into the official medical record.     Darryl KENYON MS4, a scribe, prepared the chart for today's encounter.    I, Pb Mattson, was present with the medical student who participated in the service and in the documentation of the note. I have verified the history and personally performed the physical exam and medical decision making. I agree with the assessment and plan of care as documented in the note.

## 2019-09-17 NOTE — NURSING NOTE
"Chief Complaint   Patient presents with     RECHECK     Dilated calyceal diverticulum        Height 1.473 m (4' 10\"), weight 63.5 kg (140 lb). Body mass index is 29.26 kg/m .    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)       Allergies   Allergen Reactions     Moxifloxacin Hives     Contrast Dye Other (See Comments)     \"I am not supposed to have because I only have one kidney.\"     Propoxyphene N-Apap Nausea and GI Disturbance     vomiting  vomiting       Penicillins Rash       Current Outpatient Medications   Medication Sig Dispense Refill     ondansetron (ZOFRAN-ODT) 8 MG ODT tab Place 8 mg under the tongue       acetaminophen (TYLENOL) 325 MG tablet Take 2 tablets (650 mg) by mouth every 4 hours as needed for mild pain or fever (Patient not taking: Reported on 9/17/2019) 100 tablet 0     ferrous sulfate (FEROSUL) 325 (65 Fe) MG tablet Take 1 tablet (325 mg) by mouth Every Mon, Wed, Fri Morning Start this medication one week from discharge from hospital (7/29/19). (Patient not taking: Reported on 9/17/2019) 60 tablet 3     HYDROmorphone (DILAUDID) 2 MG tablet Take 1-2 tablets (2-4 mg) by mouth every 6 hours as needed for severe pain (Patient not taking: Reported on 9/17/2019) 12 tablet 0     ibuprofen (ADVIL/MOTRIN) 600 MG tablet Take 1 tablet (600 mg) by mouth every 6 hours as needed for moderate pain (Patient not taking: Reported on 9/17/2019) 100 tablet 0     ketorolac " tromethamine (ACULAR-LS) 0.4 % SOLN ophthalmic solution Place 1 drop into both eyes 3 times daily        levofloxacin (LEVAQUIN) 250 MG tablet Take 1 tablet (250 mg) by mouth daily (Patient not taking: Reported on 9/17/2019) 3 tablet 0     LORazepam (ATIVAN) 0.5 MG tablet Take 0.5 mg by mouth as needed (Uses for flying)        order for DME Equipment being ordered: Walker Wheels () and Walker ()  Treatment Diagnosis: impaired functional gait (Patient not taking: Reported on 9/17/2019) 1 each 0     prednisoLONE acetate (PRED FORTE) 1 % ophthalmic suspension USE 1 DROP BOTH  EYES TID.       rizatriptan (MAXALT) 10 MG tablet Take by mouth as needed       senna-docusate (SENOKOT-S/PERICOLACE) 8.6-50 MG tablet Take 2 tablets by mouth 2 times daily as needed for constipation (Take as needed for constipation while taking narcotic pain medications. Stop if elevated ileostomy output (>2 L / 24 hrs) occurs.) (Patient not taking: Reported on 9/17/2019) 30 tablet 0       Social History     Tobacco Use     Smoking status: Never Smoker     Smokeless tobacco: Never Used   Substance Use Topics     Alcohol use: Not Currently     Drug use: Not on file       Abdias Lindsay  9/17/2019  1:05 PM

## 2019-09-17 NOTE — PATIENT INSTRUCTIONS
Please follow up with Dr. Mattson and your imaging in three months!    It was a pleasure meeting with you today.  Thank you for allowing me and my team the privilege of caring for you today.  YOU are the reason we are here, and I truly hope we provided you with the excellent service you deserve.  Please let us know if there is anything else we can do for you so that we can be sure you are leaving completely satisfied with your care experience.        Omari Howell, EMT

## 2019-09-17 NOTE — LETTER
2019       RE: Karo Lindsay  [address] [address]     Dear Colleague,    Thank you for referring your patient, Karo Lindsay, to the [facility] UROLOGY AND INST FOR PROSTATE AND UROLOGIC CANCERS at [facility]. Please see a copy of my visit note below.      Urology Clinic    Pb Mattson MD  Date of Service: 2019     Name: Karo Lindsay  MRN: [id number]  Age: 49 year old  : 1970  Referring provider: Akhil Causey     Assessment and Plan:  Assessment:  Karo Lindsay  is a 49 year old female who is s/p ileal conduit takedown, CATRINA, looposcopy, ureterolysis, and creation of new ileal conduit on 07/10/2019.  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  After speaking with the patient, her only symptoms currently include that of nausea which has been ongoing since her most recent procedure.  She denies any pain at all.  It is unlikely that any intervention including ureteroscopy or ultrasound guided percutaneous intervention with this cyst (possible diverticulum) would be able to alleviate any of her nausea symptoms as they are likely unrelated.  Further there is greater than typical risk with instrumentation given solitary nature of her kidney.  We will however offer her a follow up CT urogram in 3 months to monitor this lesion in the kidney.  Will obtain CT urogram to better assess renal anatomy.  If develops pain, UTI, stone prior this would change the discussion and would be greater potential benefit to active management.    Plan:  - CT Urogram in 3 months  - Refill Zofran for nausea control    ______________________________________________________________________    HPI  Karo Lindsay is a 49 year old female with a complex past medical and urological history notable for a recent stenosis of her ileal conduit with  hydroureteronephrosis in a solitary left kidney. She is s/p ileal conduit takedown, CATRINA, looposcopy, ureterolysis, and creation of new ileal conduit on 07/10/2019. 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 She had a normally positioned left kidney and a right pelvic kidney. She did well until the age of 17 years when she had trouble with recurrent UTIs and nephrolithiasis with high fevers, malaise, nausea and vomiting resulting in hospitalizations to treat half of her UTI episodes. She had multiple stone procedures. A MAG3 renogram revealed 13% function of her right kidney. A right nephrectomy was performed in 2005.      She continued to have occasional UTIs. She has been followed in Nephrology by Dr. Tucker Holder. She has been seen in Urology at [place] for loopograms ut. 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    Since her operation in July, she notes that she has had very little pain but rather her main symptom has been nausea with some vomiting and poor appetite.  She has found some relief with zofran and the past week has been better as far as her nausea goes.  She is interested in whether there is anything else that could help to relieve this nausea and if it is at all related to the cyst finding on her CT Abd/pelvis.       Review of Systems:   Pertinent items are noted in HPI or as below, remainder of complete ROS is negative.      Physical Exam:   Patient is a 49 year old  female   Vitals: There were no vitals taken for this visit.  General Appearance Adult: Alert, no acute distress, oriented  HENT: throat/mouth:normal, good dentition  Neck: No adenopathy,masses or thyromegaly  Lungs: no respiratory distress, or pursed lip breathing  Heart: No obvious jugular venous distension present  Abdomen: soft, nontender,, There is no height or weight on file to calculate BMI.  Lymphatics: No cervical  or supraclavicular adenopathy  Musculoskeltal: extremities normal, no peripheral edema  Skin: no suspicious lesions or rashes  Neuro: Alert, oriented, speech and mentation normal  Psych: affect and mood normal  Gait: Normal  : deferred    Laboratory:   I personally reviewed all applicable laboratory data and went over findings with patient  Significant for:    CBC RESULTS:  Recent Labs   Lab Test 07/21/19  0806 07/20/19  0741 07/19/19  0637 07/18/19  0635   WBC 12.8* 8.8 8.5 9.8   HGB 9.1* 9.5* 8.0* 9.7*    372 362 385        BMP RESULTS:  Recent Labs   Lab Test 07/21/19  0806 07/20/19  1051 07/20/19  0741 07/19/19  0637    137 Canceled, Test credited 137   POTASSIUM 4.2 4.0 Canceled, Test credited 3.8   CHLORIDE 105 106 Canceled, Test credited 104   CO2 23 25 Canceled, Test credited 28   ANIONGAP 9 6 Canceled, Test credited 5   * 126* Canceled, Test credited 120*   BUN 22 16 Canceled, Test credited 14   CR 0.88 0.90 Canceled, Test credited 0.80   GFRESTIMATED 76 75 Canceled, Test credited 85   GFRESTBLACK 88 87 Canceled, Test credited >90   SHAMIR 8.4* 7.9* Canceled, Test credited 7.7*       UA RESULTS:   Recent Labs   Lab Test 08/22/19  1017 07/13/19  0030   SG <=1.005 1.006   URINEPH 6.5 6.0   NITRITE Negative Negative   RBCU 2-5* 2   WBCU 25-50* 82*       CALCIUM RESULTS  Lab Results   Component Value Date    SHAMIR 8.4 07/21/2019    SHAMIR 7.9 07/20/2019    SHAMIR Canceled, Test credited 07/20/2019       INR  Recent Labs   Lab Test 07/19/19  0637 04/24/18   INR 1.05 1.0       Imaging:   I personally reviewed all applicable imaging and went over the below findings with patient.    CT A/P 7/19  IMPRESSION: In this patient with history of cloacal exstrophy status  post recent urinary conduit and urostomy formation:  1. Findings most suggestive of postoperative ileus, however there is  wall thickening in the distal aspect of the dilated loops of bowel  suggesting an ileitis. No late findings of ischemia  or focal  transition point.  2. There is a moderate amount of stranding within the mesentery with 2  small fluid collections, one of which appears to have blood in it  extending down into the right pelvis. Intra-abdominal drain does not  connect with these fluid collections. These findings are likely  postoperative.  3. External ureteral stent with tip terminating in the superior pole  of the solitary left kidney. Mild urothelial enhancement without  significant hydronephrosis. This is nonspecific and may be related to  irritation from ureteral stent as well as inflammatory changes however  ascending infection cannot be excluded. Correlate with urinalysis.  4. Incidentally noted duplicated IVC.  5. Findings most suggestive of lingular wedge resection, although  these findings are incompletely visualized. Recommend correlation with  outside operative reports and imaging studies. Otherwise dedicated  chest CT could be used for further evaluation.  6. Spina bifida at the level of the sacrum with cystic structure  extending posteriorly through the osseous defect. This may be a  meningocele. Involvement of the nerve roots cannot be evaluated on  this examination. Recommend correlation with outside studies if  available.  7. Bilateral small right and trace left pleural effusions with  overlying atelectasis versus consolidation.    Results for orders placed or performed in visit on 08/27/19   X-Ray Loopogram/nephrostogram    Narrative    EXAM: Loopogram  8/27/2019 3:29 PM     HISTORY:  Patient with solitary kidney and ileal conduit with  recurrent stricture s/p ileal conduit takedown and constriction of new  ileal conduit 7/2019      COMPARISON:  Loopogram 5/2/2019. CT 7/17/2019.    FLUOROSCOPY TIME: 1.1 minutes    FINDINGS: The  film demonstrates a nonobstructive bowel gas  pattern. There are multiple surgical clips in the right lower  quadrant. No bony abnormality. Right lower quadrant urostomy. Left  lower quadrant  ostomy. Prominent sacral spinal bony dysraphism  corresponding to meningocele seen on prior CT.    After sterile catheterization of the right lower quadrant ileal  conduit with an 8 Iraqi feeding tube, a loopogram was performed with  100 cc of Isovue 250. A single cycle of filling and draining were  observed. The conduit is normal in shape, position, and function. No  evidence of contrast leak. There is prompt reflux into a dilated left  ureter with unchanged moderate hydronephrosis. Right ureter is not  visualized.      Impression    IMPRESSION:   1.  Postoperative changes of right lower quadrant diverting ileostomy  without evidence of leak or stricture.  2.  Unchanged dilation of the left ureter and moderate left  hydronephrosis. Right kidney is absent.    I have personally reviewed the examination and initial interpretation  and I agree with the findings.    HAYDEN BOO MD       Scribe Disclosure:  Darryl KENYON MS4, am serving as a scribe to document services personally performed by Pb Mattson MD at this visit, based upon the provider's statements to me. All documentation has been reviewed by the aforementioned provider prior to being entered into the official medical record.     Darryl KENYON MS4, a scribe, prepared the chart for today's encounter.      Again, thank you for allowing me to participate in the care of your patient.      Sincerely,    Pb Mattson MD

## 2019-09-30 ENCOUNTER — PRE VISIT (OUTPATIENT)
Dept: UROLOGY | Facility: CLINIC | Age: 49
End: 2019-09-30

## 2019-10-13 NOTE — PROGRESS NOTES
"Reason for Visit: Follow up ileal conduit revision    HPI:  Karo Lindsay  is a 49 year old female who is s/p ileal conduit takedown, CATRINA, looposcopy, ureterolysis, and creation of new ileal conduit on 07/10/2019.     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   Also s/p right nephrectomy for non-functioning right pelvic kidney.   She developed left flank pain and hydronephrosis recently and loopogram confirmed free reflux of left uretero-enteric anastomosis but there was tight stenosis of the distal conduit and possible stenosis of the middle and proximal portions of the conduit.   Initially did not want surgical intervention but her pain and nausea progressed.     She has had persistent postoperative nausea which started to improve recently. Appetite slowly improving.      Past medical, surgical, family, social, allergic, and medication history reviewed  ROS -see hpi otherwise rest is negative     OBJECTIVE:  Vitals:    10/14/19 1523   BP: 120/76   Pulse: 100   Weight: 63.5 kg (140 lb)   Height: 1.473 m (4' 10\")     Constitutional: healthy, alert and no distress   Cardiovascular: regular rate, normal perfusion  Respiratory: normal work of breathing  Psychiatric: mentation appears normal and affect normal/bright  Head: Normocephalic. EOMI. Moist membranes  Abdomen: Abdomen soft, non-tender. Colostomy productive of stool, urostomy pink. Small area of granulation tissue at old LEANDRO site. No leakage from this site. Silver nitrate applied.  NEURO: Gait normal. Grossly non-focal  SKIN: Soft, dry  JOINT/EXTREMITIES: extremities normal - no gross deformities noted and gait normal        Assessment/Plan:   49 year old female 3 months s/p ileal conduit revision for stomal stenosis.     -- RTC for ultrasound and re-establish yearly follow up in 3 months since ultrasound not obtained today  -- Encouraged hydration, expect her appetite will continue to improve      Adriana " MD Dania  Reconstructive Urology Fellow

## 2019-10-14 ENCOUNTER — OFFICE VISIT (OUTPATIENT)
Dept: UROLOGY | Facility: CLINIC | Age: 49
End: 2019-10-14
Payer: COMMERCIAL

## 2019-10-14 VITALS
BODY MASS INDEX: 29.39 KG/M2 | HEIGHT: 58 IN | SYSTOLIC BLOOD PRESSURE: 120 MMHG | DIASTOLIC BLOOD PRESSURE: 76 MMHG | HEART RATE: 100 BPM | WEIGHT: 140 LBS

## 2019-10-14 DIAGNOSIS — N31.9 NEUROGENIC BLADDER: Primary | ICD-10-CM

## 2019-10-14 DIAGNOSIS — Q64.12: ICD-10-CM

## 2019-10-14 DIAGNOSIS — Z93.6 S/P ILEAL CONDUIT (H): ICD-10-CM

## 2019-10-14 ASSESSMENT — PAIN SCALES - GENERAL: PAINLEVEL: NO PAIN (0)

## 2019-10-14 ASSESSMENT — MIFFLIN-ST. JEOR: SCORE: 1144.79

## 2019-10-14 NOTE — NURSING NOTE
"Chief Complaint   Patient presents with     Follow Up     2 month follow up, Calyceal diverticulum, US prior       Blood pressure 120/76, pulse 100, height 1.473 m (4' 10\"), weight 63.5 kg (140 lb). Body mass index is 29.26 kg/m .    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)       Allergies   Allergen Reactions     Moxifloxacin Hives     Contrast Dye Other (See Comments)     \"I am not supposed to have because I only have one kidney.\"     Propoxyphene N-Apap Nausea and GI Disturbance     vomiting  vomiting       Penicillins Rash       Current Outpatient Medications   Medication Sig Dispense Refill     ketorolac tromethamine (ACULAR-LS) 0.4 % SOLN ophthalmic solution Place 1 drop into both eyes 3 times daily        ondansetron (ZOFRAN-ODT) 8 MG ODT tab Place 1 tablet (8 mg) under the tongue every 8 hours as needed for nausea 20 tablet 1     prednisoLONE acetate (PRED FORTE) 1 % ophthalmic suspension USE 1 DROP BOTH  EYES TID.       rizatriptan (MAXALT) 10 MG tablet Take by mouth as needed       acetaminophen (TYLENOL) 325 MG tablet Take 2 tablets (650 mg) by mouth every 4 hours as needed for mild pain or fever (Patient not taking: Reported on 9/17/2019) 100 tablet 0     ferrous sulfate (FEROSUL) 325 (65 Fe) MG tablet Take 1 tablet (325 mg) by mouth Every Mon, Wed, Fri Morning Start this medication one week from discharge from hospital (7/29/19). (Patient not taking: Reported on " 9/17/2019) 60 tablet 3     HYDROmorphone (DILAUDID) 2 MG tablet Take 1-2 tablets (2-4 mg) by mouth every 6 hours as needed for severe pain (Patient not taking: Reported on 9/17/2019) 12 tablet 0     ibuprofen (ADVIL/MOTRIN) 600 MG tablet Take 1 tablet (600 mg) by mouth every 6 hours as needed for moderate pain (Patient not taking: Reported on 9/17/2019) 100 tablet 0     levofloxacin (LEVAQUIN) 250 MG tablet Take 1 tablet (250 mg) by mouth daily (Patient not taking: Reported on 9/17/2019) 3 tablet 0     LORazepam (ATIVAN) 0.5 MG tablet Take 0.5 mg by mouth as needed (Uses for flying)        order for DME Equipment being ordered: Walker Wheels () and Walker ()  Treatment Diagnosis: impaired functional gait (Patient not taking: Reported on 9/17/2019) 1 each 0     senna-docusate (SENOKOT-S/PERICOLACE) 8.6-50 MG tablet Take 2 tablets by mouth 2 times daily as needed for constipation (Take as needed for constipation while taking narcotic pain medications. Stop if elevated ileostomy output (>2 L / 24 hrs) occurs.) (Patient not taking: Reported on 9/17/2019) 30 tablet 0       Social History     Tobacco Use     Smoking status: Never Smoker     Smokeless tobacco: Never Used   Substance Use Topics     Alcohol use: Not Currently     Drug use: None       Alla Haji, EMT  10/14/2019  3:26 PM

## 2019-10-14 NOTE — LETTER
"10/14/2019       RE: Karo Lindsay  [address] [address]     Dear Colleague,    Thank you for referring your patient, Karo Lindsay, to the [facility] UROLOGY AND INST FOR PROSTATE AND UROLOGIC CANCERS at [facility]. Please see a copy of my visit note below.    Reason for Visit: Follow up ileal conduit revision    HPI:  Karo Lindsay  is a 49 year old female who is s/p ileal conduit takedown, CATRINA, looposcopy, ureterolysis, and creation of new ileal conduit on 07/10/2019.     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   Also s/p right nephrectomy for non-functioning right pelvic kidney.   She developed left flank pain and hydronephrosis recently and loopogram confirmed free reflux of left uretero-enteric anastomosis but there was tight stenosis of the distal conduit and possible stenosis of the middle and proximal portions of the conduit.   Initially did not want surgical intervention but her pain and nausea progressed.     She has had persistent postoperative nausea which started to improve recently. Appetite slowly improving.      Past medical, surgical, family, social, allergic, and medication history reviewed  ROS -see hpi otherwise rest is negative     OBJECTIVE:  Vitals:    10/14/19 1523   BP: 120/76   Pulse: 100   Weight: 63.5 kg (140 lb)   Height: 1.473 m (4' 10\")     Constitutional: healthy, alert and no distress   Cardiovascular: regular rate, normal perfusion  Respiratory: normal work of breathing  Psychiatric: mentation appears normal and affect normal/bright  Head: Normocephalic. EOMI. Moist membranes  Abdomen: Abdomen soft, non-tender. Colostomy productive of stool, urostomy pink. Small area of granulation tissue at old LEANDRO site. No leakage from this site. Silver nitrate applied.  NEURO: Gait normal. Grossly non-focal  SKIN: Soft, dry  JOINT/EXTREMITIES: extremities normal - no gross " deformities noted and gait normal        Assessment/Plan:   49 year old female 3 months s/p ileal conduit revision for stomal stenosis.     -- RTC for ultrasound and re-establish yearly follow up in 3 months since ultrasound not obtained today  -- Encouraged hydration, expect her appetite will continue to improve      Adriana Najera MD  Reconstructive Urology Fellow

## 2019-10-14 NOTE — PATIENT INSTRUCTIONS
Please schedule a follow up appointment with Dr. Najera in 3 months with a Renal US prior.      It was a pleasure meeting with you today.  Thank you for allowing me and my team the privilege of caring for you today.  YOU are the reason we are here, and I truly hope we provided you with the excellent service you deserve.  Please let us know if there is anything else we can do for you so that we can be sure you are leaving completely satisfied with your care experience.

## 2019-11-27 ENCOUNTER — PRE VISIT (OUTPATIENT)
Dept: UROLOGY | Facility: CLINIC | Age: 49
End: 2019-11-27

## 2019-11-27 NOTE — TELEPHONE ENCOUNTER
Reason for Visit: 3 month follow up, imaging done prior    Orders/Procedures/Records: Records available    Contact Patient: N/A    Rooming Requirements: Micaela Haji  11/27/19  10:37 AM

## 2019-12-17 ENCOUNTER — ANCILLARY PROCEDURE (OUTPATIENT)
Dept: ULTRASOUND IMAGING | Facility: CLINIC | Age: 49
End: 2019-12-17
Attending: UROLOGY
Payer: COMMERCIAL

## 2019-12-17 ENCOUNTER — OFFICE VISIT (OUTPATIENT)
Dept: UROLOGY | Facility: CLINIC | Age: 49
End: 2019-12-17
Payer: COMMERCIAL

## 2019-12-17 VITALS
BODY MASS INDEX: 29.39 KG/M2 | SYSTOLIC BLOOD PRESSURE: 117 MMHG | HEIGHT: 58 IN | HEART RATE: 89 BPM | DIASTOLIC BLOOD PRESSURE: 55 MMHG | WEIGHT: 140 LBS

## 2019-12-17 DIAGNOSIS — N28.1 ACQUIRED CYST OF KIDNEY: Primary | ICD-10-CM

## 2019-12-17 DIAGNOSIS — N31.9 NEUROGENIC BLADDER: ICD-10-CM

## 2019-12-17 RX ORDER — BRIMONIDINE TARTRATE AND TIMOLOL MALEATE 2; 5 MG/ML; MG/ML
1 SOLUTION OPHTHALMIC 2 TIMES DAILY
COMMUNITY
End: 2023-07-20

## 2019-12-17 RX ORDER — BRINZOLAMIDE 10 MG/ML
1 SUSPENSION/ DROPS OPHTHALMIC
COMMUNITY
End: 2023-07-20

## 2019-12-17 ASSESSMENT — PAIN SCALES - GENERAL: PAINLEVEL: NO PAIN (0)

## 2019-12-17 ASSESSMENT — MIFFLIN-ST. JEOR: SCORE: 1144.79

## 2019-12-17 NOTE — LETTER
2019       RE: Karo Lindsay  [address] [facility] [address]     Dear Colleague,    Thank you for referring your patient, Karo Lindsay, to the [facility] UROLOGY AND INST FOR PROSTATE AND UROLOGIC CANCERS at [facility]. Please see a copy of my visit note below.    Urology Clinic    Pb Mattson MD  Date of Service: 2019     Name: Karo Lindsay  MRN: [id number]  Age: 49 year old  : 1970  Referring provider: Jamey Adame     Assessment and Plan:  Assessment:  Karo Lindsay  is a 49 year old female with history of recent stenosis of her ileal conduit with hydroureteronephrosis in a solitary left kidney s/p ileal conduit takedown, CATRINA, looposcopy, ureterolysis, and creation of new ileal conduit on 07/10/2019. She was having prolonged nausea after bowel surgery and on prior imaging was noted to have a renal cyst versus renal calyceal diverticulum. She was scheduled for a CT urogram today to better characterize it but creatinine was elevated from 0.9 to 1.4, so this was deferred.     Plan:    We discussed that given improvement in nausea and no pain, CT is not needed. We will obtain a renal ultrasound today.     Repeat creatinine on follow up with Dr. Najera.   ______________________________________________________________________    HPI  Karo Lindsay is a 49 year old female with a complex past medical and urological history notable for a recent stenosis of her ileal conduit with hydroureteronephrosis in a solitary left kidney. She is s/p ileal conduit takedown, CATRINA, looposcopy, ureterolysis, and creation of new ileal conduit on 07/10/2019. 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 She had a normally positioned left kidney and a right pelvic kidney. She did well until the age of 17 years when she had trouble with recurrent UTIs and nephrolithiasis with high fevers,  "malaise, nausea and vomiting resulting in hospitalizations to treat half of her UTI episodes. She had multiple stone procedures. A MAG3 renogram revealed 13% function of her right kidney. A right nephrectomy was performed in 2005.       Was seen several months ago for a left sided calyceal diverticulum vs. Cyst.  She had no pain but noted prolonged nausea and there was some concern the diverticulum could have been contributing.     Today she is doing well. She continues to have fatigue. Nausea had resolved almost entirely.  She has noticed increased thirst over the past week. Denies flank pain, hematuria, UTI.    Review of Systems:   Pertinent items are noted in HPI or as below, remainder of complete ROS is negative.      Physical Exam:   Patient is a 49 year old  female   Vitals: Blood pressure 117/55, pulse 89, height 1.473 m (4' 10\"), weight 63.5 kg (140 lb).  Notable Findings on Exam: Well-nourished female in no apparent distress. No flank pain.     Laboratory:   I personally reviewed all applicable laboratory data and went over findings with patient  Significant for:    CBC RESULTS:  Recent Labs   Lab Test 07/21/19  0806 07/20/19  0741 07/19/19  0637 07/18/19  0635   WBC 12.8* 8.8 8.5 9.8   HGB 9.1* 9.5* 8.0* 9.7*    372 362 385        BMP RESULTS:  Recent Labs   Lab Test 07/21/19  0806 07/20/19  1051 07/20/19  0741 07/19/19  0637    137 Canceled, Test credited 137   POTASSIUM 4.2 4.0 Canceled, Test credited 3.8   CHLORIDE 105 106 Canceled, Test credited 104   CO2 23 25 Canceled, Test credited 28   ANIONGAP 9 6 Canceled, Test credited 5   * 126* Canceled, Test credited 120*   BUN 22 16 Canceled, Test credited 14   CR 0.88 0.90 Canceled, Test credited 0.80   GFRESTIMATED 76 75 Canceled, Test credited 85   GFRESTBLACK 88 87 Canceled, Test credited >90   SHAMIR 8.4* 7.9* Canceled, Test credited 7.7*     UA RESULTS:   Recent Labs   Lab Test 08/22/19  1017 07/13/19  0030   SG <=1.005 1.006   URINEPH " 6.5 6.0   NITRITE Negative Negative   RBCU 2-5* 2   WBCU 25-50* 82*     CALCIUM RESULTS  Lab Results   Component Value Date    SHAMIR 8.4 07/21/2019    SHAMIR 7.9 07/20/2019    SHAMIR Canceled, Test credited 07/20/2019     INR  Recent Labs   Lab Test 07/19/19  0637 04/24/18   INR 1.05 1.0     Imaging:   I personally reviewed all applicable imaging and went over the below findings with patient.    Results for orders placed or performed in visit on 08/27/19   X-Ray Loopogram/nephrostogram    Addendum: 9/24/2019    I, HAYDEN BOO MD, attest that I was present for all critical  portions of the procedure and was immediately available to provide  guidance and assistance during the remainder of the procedure.    HYADEN BOO MD      Narrative    EXAM: Loopogram  8/27/2019 3:29 PM     HISTORY:  Patient with solitary kidney and ileal conduit with  recurrent stricture s/p ileal conduit takedown and constriction of new  ileal conduit 7/2019      COMPARISON:  Loopogram 5/2/2019. CT 7/17/2019.    FLUOROSCOPY TIME: 1.1 minutes    FINDINGS: The  film demonstrates a nonobstructive bowel gas  pattern. There are multiple surgical clips in the right lower  quadrant. No bony abnormality. Right lower quadrant urostomy. Left  lower quadrant ostomy. Prominent sacral spinal bony dysraphism  corresponding to meningocele seen on prior CT.    After sterile catheterization of the right lower quadrant ileal  conduit with an 8 Ghanaian feeding tube, a loopogram was performed with  100 cc of Isovue 250. A single cycle of filling and draining were  observed. The conduit is normal in shape, position, and function. No  evidence of contrast leak. There is prompt reflux into a dilated left  ureter with unchanged moderate hydronephrosis. Right ureter is not  visualized.      Impression    IMPRESSION:   1.  Postoperative changes of right lower quadrant diverting ileostomy  without evidence of leak or stricture.  2.  Unchanged dilation of the left ureter  and moderate left  hydronephrosis. Right kidney is absent.    I have personally reviewed the examination and initial interpretation  and I agree with the findings.    HAYDEN BOO MD     Scribe Disclosure:  I, Cindi Thompson, am serving as a scribe to document services personally performed by Pb Mattson MD at this visit, based upon the provider's statements to me. All documentation has been reviewed by the aforementioned provider prior to being entered into the official medical record.

## 2019-12-17 NOTE — PATIENT INSTRUCTIONS
Please get your US done today if possible and follow up with Dr. Najera as scheduled.      It was a pleasure meeting with you today.  Thank you for allowing me and my team the privilege of caring for you today.  YOU are the reason we are here, and I truly hope we provided you with the excellent service you deserve.  Please let us know if there is anything else we can do for you so that we can be sure you are leaving completely satisfied with your care experience.

## 2019-12-17 NOTE — NURSING NOTE
"Return-  CT not done  She denies any conduit complications of UTI sx and pains.      Chief Complaint   Patient presents with     Follow Up      3 month follow up       Blood pressure 117/55, pulse 89, height 1.473 m (4' 10\"), weight 63.5 kg (140 lb). Body mass index is 29.26 kg/m .    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)       Allergies   Allergen Reactions     Moxifloxacin Hives     Contrast Dye Other (See Comments)     \"I am not supposed to have because I only have one kidney.\"     Propoxyphene N-Apap Nausea and GI Disturbance     vomiting  vomiting       Penicillins Rash       Current Outpatient Medications   Medication Sig Dispense Refill     brimonidine-timolol (COMBIGAN) 0.2-0.5 % ophthalmic solution Place 1 drop into both eyes 2 times daily       brinzolamide (AZOPT) 1 % ophthalmic suspension Place 1 drop Into the left eye 2 times daily       ketorolac tromethamine (ACULAR-LS) 0.4 % SOLN ophthalmic solution Place 1 drop into both eyes 3 times daily        LORazepam (ATIVAN) 0.5 MG tablet Take 0.5 mg by mouth as needed (Uses for flying)        prednisoLONE acetate (PRED FORTE) 1 % ophthalmic suspension USE 1 DROP BOTH  EYES TID.       acetaminophen (TYLENOL) 325 MG tablet Take 2 tablets (650 mg) by mouth every 4 hours as needed for mild pain or fever (Patient not taking: Reported on 9/17/2019) 100 tablet 0     ferrous sulfate (FEROSUL) 325 (65 Fe) MG tablet Take 1 tablet " (325 mg) by mouth Every Mon, Wed, Fri Morning Start this medication one week from discharge from hospital (7/29/19). (Patient not taking: Reported on 9/17/2019) 60 tablet 3     HYDROmorphone (DILAUDID) 2 MG tablet Take 1-2 tablets (2-4 mg) by mouth every 6 hours as needed for severe pain (Patient not taking: Reported on 9/17/2019) 12 tablet 0     ibuprofen (ADVIL/MOTRIN) 600 MG tablet Take 1 tablet (600 mg) by mouth every 6 hours as needed for moderate pain (Patient not taking: Reported on 9/17/2019) 100 tablet 0     levofloxacin (LEVAQUIN) 250 MG tablet Take 1 tablet (250 mg) by mouth daily (Patient not taking: Reported on 9/17/2019) 3 tablet 0     ondansetron (ZOFRAN-ODT) 8 MG ODT tab Place 1 tablet (8 mg) under the tongue every 8 hours as needed for nausea 20 tablet 1     order for DME Equipment being ordered: Walker Wheels () and Walker ()  Treatment Diagnosis: impaired functional gait (Patient not taking: Reported on 9/17/2019) 1 each 0     rizatriptan (MAXALT) 10 MG tablet Take by mouth as needed       senna-docusate (SENOKOT-S/PERICOLACE) 8.6-50 MG tablet Take 2 tablets by mouth 2 times daily as needed for constipation (Take as needed for constipation while taking narcotic pain medications. Stop if elevated ileostomy output (>2 L / 24 hrs) occurs.) (Patient not taking: Reported on 9/17/2019) 30 tablet 0       Social History     Tobacco Use     Smoking status: Never Smoker     Smokeless tobacco: Never Used   Substance Use Topics     Alcohol use: Not Currently     Drug use: None       Omari Howell, EMT, EMT  12/17/2019  9:28 AM

## 2019-12-17 NOTE — PROGRESS NOTES
Urology Clinic    Pb Mattson MD  Date of Service: 2019     Name: Karo Lindsay  MRN: [id number]  Age: 49 year old  : 1970  Referring provider: Jamey Adame     Assessment and Plan:  Assessment:  Karo Lindsay  is a 49 year old female with history of recent stenosis of her ileal conduit with hydroureteronephrosis in a solitary left kidney s/p ileal conduit takedown, CATRINA, looposcopy, ureterolysis, and creation of new ileal conduit on 07/10/2019. She was having prolonged nausea after bowel surgery and on prior imaging was noted to have a renal cyst versus renal calyceal diverticulum. She was scheduled for a CT urogram today to better characterize it but creatinine was elevated from 0.9 to 1.4, so this was deferred.     Plan:    We discussed that given improvement in nausea and no pain, CT is not needed. We will obtain a renal ultrasound today.     Repeat creatinine on follow up with Dr. Najera.   ______________________________________________________________________    HPI  Karo Lindsay is a 49 year old female with a complex past medical and urological history notable for a recent stenosis of her ileal conduit with hydroureteronephrosis in a solitary left kidney. She is s/p ileal conduit takedown, CATRINA, looposcopy, ureterolysis, and creation of new ileal conduit on 07/10/2019. 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 She had a normally positioned left kidney and a right pelvic kidney. She did well until the age of 17 years when she had trouble with recurrent UTIs and nephrolithiasis with high fevers, malaise, nausea and vomiting resulting in hospitalizations to treat half of her UTI episodes. She had multiple stone procedures. A MAG3 renogram revealed 13% function of her right kidney. A right nephrectomy was performed in .       Was seen several months ago for a left sided calyceal diverticulum vs. Cyst.  She had no  "pain but noted prolonged nausea and there was some concern the diverticulum could have been contributing.     Today she is doing well. She continues to have fatigue. Nausea had resolved almost entirely.  She has noticed increased thirst over the past week. Denies flank pain, hematuria, UTI.    Review of Systems:   Pertinent items are noted in HPI or as below, remainder of complete ROS is negative.      Physical Exam:   Patient is a 49 year old  female   Vitals: Blood pressure 117/55, pulse 89, height 1.473 m (4' 10\"), weight 63.5 kg (140 lb).  Notable Findings on Exam: Well-nourished female in no apparent distress. No flank pain.     Laboratory:   I personally reviewed all applicable laboratory data and went over findings with patient  Significant for:    CBC RESULTS:  Recent Labs   Lab Test 07/21/19  0806 07/20/19  0741 07/19/19  0637 07/18/19  0635   WBC 12.8* 8.8 8.5 9.8   HGB 9.1* 9.5* 8.0* 9.7*    372 362 385        BMP RESULTS:  Recent Labs   Lab Test 07/21/19  0806 07/20/19  1051 07/20/19  0741 07/19/19  0637    137 Canceled, Test credited 137   POTASSIUM 4.2 4.0 Canceled, Test credited 3.8   CHLORIDE 105 106 Canceled, Test credited 104   CO2 23 25 Canceled, Test credited 28   ANIONGAP 9 6 Canceled, Test credited 5   * 126* Canceled, Test credited 120*   BUN 22 16 Canceled, Test credited 14   CR 0.88 0.90 Canceled, Test credited 0.80   GFRESTIMATED 76 75 Canceled, Test credited 85   GFRESTBLACK 88 87 Canceled, Test credited >90   SHAMIR 8.4* 7.9* Canceled, Test credited 7.7*     UA RESULTS:   Recent Labs   Lab Test 08/22/19  1017 07/13/19  0030   SG <=1.005 1.006   URINEPH 6.5 6.0   NITRITE Negative Negative   RBCU 2-5* 2   WBCU 25-50* 82*     CALCIUM RESULTS  Lab Results   Component Value Date    SHAMIR 8.4 07/21/2019    SHAMIR 7.9 07/20/2019    SHAMIR Canceled, Test credited 07/20/2019     INR  Recent Labs   Lab Test 07/19/19  0637 04/24/18   INR 1.05 1.0     Imaging:   I personally reviewed all " applicable imaging and went over the below findings with patient.    Results for orders placed or performed in visit on 08/27/19   X-Ray Loopogram/nephrostogram    Addendum: 9/24/2019    I, HAYDEN BOO MD, attest that I was present for all critical  portions of the procedure and was immediately available to provide  guidance and assistance during the remainder of the procedure.    HAYDEN BOO MD      Narrative    EXAM: Loopogram  8/27/2019 3:29 PM     HISTORY:  Patient with solitary kidney and ileal conduit with  recurrent stricture s/p ileal conduit takedown and constriction of new  ileal conduit 7/2019      COMPARISON:  Loopogram 5/2/2019. CT 7/17/2019.    FLUOROSCOPY TIME: 1.1 minutes    FINDINGS: The  film demonstrates a nonobstructive bowel gas  pattern. There are multiple surgical clips in the right lower  quadrant. No bony abnormality. Right lower quadrant urostomy. Left  lower quadrant ostomy. Prominent sacral spinal bony dysraphism  corresponding to meningocele seen on prior CT.    After sterile catheterization of the right lower quadrant ileal  conduit with an 8 Swedish feeding tube, a loopogram was performed with  100 cc of Isovue 250. A single cycle of filling and draining were  observed. The conduit is normal in shape, position, and function. No  evidence of contrast leak. There is prompt reflux into a dilated left  ureter with unchanged moderate hydronephrosis. Right ureter is not  visualized.      Impression    IMPRESSION:   1.  Postoperative changes of right lower quadrant diverting ileostomy  without evidence of leak or stricture.  2.  Unchanged dilation of the left ureter and moderate left  hydronephrosis. Right kidney is absent.    I have personally reviewed the examination and initial interpretation  and I agree with the findings.    HAYDEN BOO MD     Scribe Disclosure:  ICindi, am serving as a scribe to document services personally performed by Pb Mattson MD  at this visit, based upon the provider's statements to me. All documentation has been reviewed by the aforementioned provider prior to being entered into the official medical record.

## 2019-12-18 ENCOUNTER — PRE VISIT (OUTPATIENT)
Dept: UROLOGY | Facility: CLINIC | Age: 49
End: 2019-12-18

## 2019-12-18 NOTE — TELEPHONE ENCOUNTER
Reason for Visit: Follow up, US done prior    Diagnosis: NGB    Orders/Procedures/Records: Records available    Contact Patient: N/A    Rooming Requirements: Micaela Haji  12/18/19  3:00 PM

## 2020-01-03 ENCOUNTER — TELEPHONE (OUTPATIENT)
Dept: UROLOGY | Facility: CLINIC | Age: 50
End: 2020-01-03

## 2020-01-03 NOTE — TELEPHONE ENCOUNTER
[facility] Call Center    Phone Message    May a detailed message be left on voicemail: yes    Reason for Call: Other: PT has an appointment coming up with Nona Najera.  PT states she just had an ultrasound on 12/17/19 and is wondering if another one is still needed before her upcoming appointment.  Please follow up with the PT.      Action Taken: Message routed to:  [facility] ([facility] urology

## 2020-02-09 NOTE — PROGRESS NOTES
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 She is s/p ileal conduit takedown, CATRINA, looposcopy, ureterolysis, and creation of new ileal conduit on 07/10/2019. 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 She had a normally positioned left kidney and a right pelvic kidney. She did well until the age of 17 years when she had trouble with recurrent UTIs and nephrolithiasis with high fevers, malaise, nausea and vomiting resulting in hospitalizations to treat half of her UTI episodes. She had multiple stone procedures. A MAG3 renogram revealed 13% function of her right kidney. A right nephrectomy was performed in 2005.    After her recent ileal conduit revision she had prolonged nausea. Wondered if it could be due to calyceal diverticulum but she slowly improved.   Saw Dr. Mattson in 12/2019 and CTU could not be obtained because creat was up to 1.4 from 0.9.  She had a renal US that day that appeared stable from her Renal US in 8/2019 which shows mild-mod hydro but loopogram in 8/2019 showed free reflux.     Today she reports doing great. No more nausea. No more pain. Her urine is clear. No UTIs. Her stoma appliance stays for a week at a time.     Current Outpatient Medications   Medication Sig Dispense Refill     acetaminophen (TYLENOL) 325 MG tablet Take 2 tablets (650 mg) by mouth every 4 hours as needed for mild pain or fever (Patient not taking: Reported on 9/17/2019) 100 tablet 0     brimonidine-timolol (COMBIGAN) 0.2-0.5 % ophthalmic solution Place 1 drop into both eyes 2 times daily       brinzolamide (AZOPT) 1 % ophthalmic suspension Place 1 drop Into the left eye 2 times daily       ferrous sulfate (FEROSUL) 325 (65 Fe) MG tablet Take 1  "tablet (325 mg) by mouth Every Mon, Wed, Fri Morning Start this medication one week from discharge from hospital (7/29/19). (Patient not taking: Reported on 9/17/2019) 60 tablet 3     HYDROmorphone (DILAUDID) 2 MG tablet Take 1-2 tablets (2-4 mg) by mouth every 6 hours as needed for severe pain (Patient not taking: Reported on 9/17/2019) 12 tablet 0     ibuprofen (ADVIL/MOTRIN) 600 MG tablet Take 1 tablet (600 mg) by mouth every 6 hours as needed for moderate pain (Patient not taking: Reported on 9/17/2019) 100 tablet 0     ketorolac tromethamine (ACULAR-LS) 0.4 % SOLN ophthalmic solution Place 1 drop into both eyes 3 times daily        levofloxacin (LEVAQUIN) 250 MG tablet Take 1 tablet (250 mg) by mouth daily (Patient not taking: Reported on 9/17/2019) 3 tablet 0     LORazepam (ATIVAN) 0.5 MG tablet Take 0.5 mg by mouth as needed (Uses for flying)        ondansetron (ZOFRAN-ODT) 8 MG ODT tab Place 1 tablet (8 mg) under the tongue every 8 hours as needed for nausea 20 tablet 1     order for DME Equipment being ordered: [employer] Wheels () and Walker ()  Treatment Diagnosis: impaired functional gait (Patient not taking: Reported on 9/17/2019) 1 each 0     prednisoLONE acetate (PRED FORTE) 1 % ophthalmic suspension USE 1 DROP BOTH  EYES TID.       rizatriptan (MAXALT) 10 MG tablet Take by mouth as needed       senna-docusate (SENOKOT-S/PERICOLACE) 8.6-50 MG tablet Take 2 tablets by mouth 2 times daily as needed for constipation (Take as needed for constipation while taking narcotic pain medications. Stop if elevated ileostomy output (>2 L / 24 hrs) occurs.) (Patient not taking: Reported on 9/17/2019) 30 tablet 0       Allergies   Allergen Reactions     Moxifloxacin Hives     Contrast Dye Other (See Comments)     \"I am not supposed to have because I only have one kidney.\"     Propoxyphene N-Apap Nausea and GI Disturbance     vomiting  vomiting       Penicillins Rash     Past Medical History:   Diagnosis Date "     Chronic UTI      Cloacal exstrophy      PONV (postoperative nausea and vomiting)      Past Surgical History:   Procedure Laterality Date     C REMV BLADDER/NODES,ILEAL CONDUIT       COLOSTOMY       HYSTERECTOMY       NEPHRECTOMY       REVISE URINARY CONDUIT N/A 7/10/2019    Procedure: Take Down Of Urinary Conduit,Creation of a New Conduit ,Looposcopy, Extensive Lysis of Adhesions and Left Ureteral Stent Exchange;  Surgeon: Akhil Causey MD;  Location: [facility]         ROS -see hpi otherwise rest is negative     OBJECTIVE:  Vitals:    02/10/20 1337   BP: 115/72   BP Location: Right arm   Patient Position: Sitting   Pulse: 100     Constitutional: healthy, alert and no distress   Cardiovascular: regular rate, normal perfusion  Respiratory: normal work of breathing  Psychiatric: mentation appears normal and affect normal/bright  Head: Normocephalic. EOMI. Moist membranes  Abdomen: Abdomen soft, non-tender. Incision well healed.  : stoma protuberant, appliance with good seal, urine yellow  NEURO: Gait normal. 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 Had new conduit created in July 2019 for stenosis and hydronephrosis of solitary left kidney. She had persistent post op nausea which has resolved. Ultrasound 12/17/19 showed stable mild hydro. Her creatinine at the time was 1.4 from 0.9. Her loopogram at the time of her last ultrasound in Aug 2019 showed free reflux.     Suspect her elevated creatinine at her last check may have been result of nausea and dehydration. Will recheck this today with electrolytes given her femur fracture we discussed acid-base balance and she is already seeing endocrinology in the future so may need alkalinizing agents. She was encouraged to take calcium + D which she just started. Will add a B12  level since I cannot see a recent one.     -- Renal US + KUB in 12/2020   -- Will need BMP + B12 level today and in 1 year  -- Will update patient with results    Adriana Najera MD  Reconstructive Urology Fellow

## 2020-02-10 ENCOUNTER — OFFICE VISIT (OUTPATIENT)
Dept: UROLOGY | Facility: CLINIC | Age: 50
End: 2020-02-10
Payer: COMMERCIAL

## 2020-02-10 VITALS — HEART RATE: 100 BPM | DIASTOLIC BLOOD PRESSURE: 72 MMHG | SYSTOLIC BLOOD PRESSURE: 115 MMHG

## 2020-02-10 DIAGNOSIS — Q64.12: ICD-10-CM

## 2020-02-10 DIAGNOSIS — Z98.890 HISTORY OF URINARY DIVERSION PROCEDURE: ICD-10-CM

## 2020-02-10 DIAGNOSIS — Q64.12: Primary | ICD-10-CM

## 2020-02-10 LAB
ANION GAP SERPL CALCULATED.3IONS-SCNC: 8 MMOL/L (ref 3–14)
BUN SERPL-MCNC: 51 MG/DL (ref 7–30)
CALCIUM SERPL-MCNC: 9.7 MG/DL (ref 8.5–10.1)
CHLORIDE SERPL-SCNC: 106 MMOL/L (ref 94–109)
CO2 SERPL-SCNC: 22 MMOL/L (ref 20–32)
CREAT SERPL-MCNC: 1.49 MG/DL (ref 0.52–1.04)
GFR SERPL CREATININE-BSD FRML MDRD: 40 ML/MIN/{1.73_M2}
GLUCOSE SERPL-MCNC: 121 MG/DL (ref 70–99)
POTASSIUM SERPL-SCNC: 4 MMOL/L (ref 3.4–5.3)
SODIUM SERPL-SCNC: 135 MMOL/L (ref 133–144)
VIT B12 SERPL-MCNC: 255 PG/ML (ref 193–986)

## 2020-02-10 ASSESSMENT — PAIN SCALES - GENERAL: PAINLEVEL: NO PAIN (0)

## 2020-02-10 NOTE — NURSING NOTE
"Chief Complaint   Patient presents with     Follow Up     US completed, Hx of NGB       Blood pressure 115/72, pulse 100. There is no height or weight on file to calculate BMI.    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)       Allergies   Allergen Reactions     Moxifloxacin Hives     Contrast Dye Other (See Comments)     \"I am not supposed to have because I only have one kidney.\"     Propoxyphene N-Apap Nausea and GI Disturbance     vomiting  vomiting       Penicillins Rash       Current Outpatient Medications   Medication Sig Dispense Refill     brimonidine-timolol (COMBIGAN) 0.2-0.5 % ophthalmic solution Place 1 drop into both eyes 2 times daily       brinzolamide (AZOPT) 1 % ophthalmic suspension Place 1 drop Into the left eye 2 times daily       ketorolac tromethamine (ACULAR-LS) 0.4 % SOLN ophthalmic solution Place 1 drop into both eyes 3 times daily        rizatriptan (MAXALT) 10 MG tablet Take by mouth as needed       acetaminophen (TYLENOL) 325 MG tablet Take 2 tablets (650 mg) by mouth every 4 hours as needed for mild pain or fever (Patient not taking: Reported on 9/17/2019) 100 tablet 0     ferrous sulfate (FEROSUL) 325 (65 Fe) MG tablet Take 1 tablet (325 mg) by mouth Every Mon, Wed, Fri Morning Start this medication one week from discharge from hospital (7/29/19). (Patient not taking: Reported on 9/17/2019) 60 tablet 3     HYDROmorphone (DILAUDID) 2 MG tablet Take 1-2 " tablets (2-4 mg) by mouth every 6 hours as needed for severe pain (Patient not taking: Reported on 9/17/2019) 12 tablet 0     ibuprofen (ADVIL/MOTRIN) 600 MG tablet Take 1 tablet (600 mg) by mouth every 6 hours as needed for moderate pain (Patient not taking: Reported on 9/17/2019) 100 tablet 0     levofloxacin (LEVAQUIN) 250 MG tablet Take 1 tablet (250 mg) by mouth daily (Patient not taking: Reported on 9/17/2019) 3 tablet 0     LORazepam (ATIVAN) 0.5 MG tablet Take 0.5 mg by mouth as needed (Uses for flying)        ondansetron (ZOFRAN-ODT) 8 MG ODT tab Place 1 tablet (8 mg) under the tongue every 8 hours as needed for nausea (Patient not taking: Reported on 2/10/2020) 20 tablet 1     order for DME Equipment being ordered: Walker Wheels () and Walker ()  Treatment Diagnosis: impaired functional gait (Patient not taking: Reported on 9/17/2019) 1 each 0     prednisoLONE acetate (PRED FORTE) 1 % ophthalmic suspension USE 1 DROP BOTH  EYES TID.       senna-docusate (SENOKOT-S/PERICOLACE) 8.6-50 MG tablet Take 2 tablets by mouth 2 times daily as needed for constipation (Take as needed for constipation while taking narcotic pain medications. Stop if elevated ileostomy output (>2 L / 24 hrs) occurs.) (Patient not taking: Reported on 9/17/2019) 30 tablet 0       Social History     Tobacco Use     Smoking status: Never Smoker     Smokeless tobacco: Never Used   Substance Use Topics     Alcohol use: Not Currently     Drug use: Not on file       SONAM Houston  2/10/2020  1:39 PM

## 2020-02-10 NOTE — PATIENT INSTRUCTIONS
Please schedule a follow up appointment with Dr. Causey in December 2020 with labs and imaging done prior.      It was a pleasure meeting with you today.  Thank you for allowing me and my team the privilege of caring for you today.  YOU are the reason we are here, and I truly hope we provided you with the excellent service you deserve.  Please let us know if there is anything else we can do for you so that we can be sure you are leaving completely satisfied with your care experience.

## 2020-02-10 NOTE — LETTER
2/10/2020       RE: Karo Lindsay  [address] [address]     Dear Colleague,    Thank you for referring your patient, Karo Lindsay, to the [facility] UROLOGY AND INST FOR PROSTATE AND UROLOGIC CANCERS at [facility]. Please see a copy of my visit note below.    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 She is s/p ileal conduit takedown, CATRINA, looposcopy, ureterolysis, and creation of new ileal conduit on 07/10/2019. 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 She had a normally positioned left kidney and a right pelvic kidney. She did well until the age of 17 years when she had trouble with recurrent UTIs and nephrolithiasis with high fevers, malaise, nausea and vomiting resulting in hospitalizations to treat half of her UTI episodes. She had multiple stone procedures. A MAG3 renogram revealed 13% function of her right kidney. A right nephrectomy was performed in 2005.    After her recent ileal conduit revision she had prolonged nausea. Wondered if it could be due to calyceal diverticulum but she slowly improved.   Saw Dr. Mattson in 12/2019 and CTU could not be obtained because creat was up to 1.4 from 0.9.  She had a renal US that day that appeared stable from her Renal US in 8/2019 which shows mild-mod hydro but loopogram in 8/2019 showed free reflux.     Today she reports doing great. No more nausea. No more pain. Her urine is clear. No UTIs. Her stoma appliance stays for a week at a time.     Current Outpatient Medications   Medication Sig Dispense Refill     acetaminophen (TYLENOL) 325 MG tablet Take 2 tablets (650 mg) by mouth every 4 hours as needed for mild pain or fever  (Patient not taking: Reported on 9/17/2019) 100 tablet 0     brimonidine-timolol (COMBIGAN) 0.2-0.5 % ophthalmic solution Place 1 drop into both eyes 2 times daily       brinzolamide (AZOPT) 1 % ophthalmic suspension Place 1 drop Into the left eye 2 times daily       ferrous sulfate (FEROSUL) 325 (65 Fe) MG tablet Take 1 tablet (325 mg) by mouth Every Mon, Wed, Fri Morning Start this medication one week from discharge from hospital (7/29/19). (Patient not taking: Reported on 9/17/2019) 60 tablet 3     HYDROmorphone (DILAUDID) 2 MG tablet Take 1-2 tablets (2-4 mg) by mouth every 6 hours as needed for severe pain (Patient not taking: Reported on 9/17/2019) 12 tablet 0     ibuprofen (ADVIL/MOTRIN) 600 MG tablet Take 1 tablet (600 mg) by mouth every 6 hours as needed for moderate pain (Patient not taking: Reported on 9/17/2019) 100 tablet 0     ketorolac tromethamine (ACULAR-LS) 0.4 % SOLN ophthalmic solution Place 1 drop into both eyes 3 times daily        levofloxacin (LEVAQUIN) 250 MG tablet Take 1 tablet (250 mg) by mouth daily (Patient not taking: Reported on 9/17/2019) 3 tablet 0     LORazepam (ATIVAN) 0.5 MG tablet Take 0.5 mg by mouth as needed (Uses for flying)        ondansetron (ZOFRAN-ODT) 8 MG ODT tab Place 1 tablet (8 mg) under the tongue every 8 hours as needed for nausea 20 tablet 1     order for DME Equipment being ordered: Walker Wheels () and Walker ()  Treatment Diagnosis: impaired functional gait (Patient not taking: Reported on 9/17/2019) 1 each 0     prednisoLONE acetate (PRED FORTE) 1 % ophthalmic suspension USE 1 DROP BOTH  EYES TID.       rizatriptan (MAXALT) 10 MG tablet Take by mouth as needed       senna-docusate (SENOKOT-S/PERICOLACE) 8.6-50 MG tablet Take 2 tablets by mouth 2 times daily as needed for constipation (Take as needed for constipation while taking narcotic pain medications. Stop if elevated ileostomy output (>2 L / 24 hrs) occurs.) (Patient not taking: Reported on  "9/17/2019) 30 tablet 0       Allergies   Allergen Reactions     Moxifloxacin Hives     Contrast Dye Other (See Comments)     \"I am not supposed to have because I only have one kidney.\"     Propoxyphene N-Apap Nausea and GI Disturbance     vomiting  vomiting       Penicillins Rash     Past Medical History:   Diagnosis Date     Chronic UTI      Cloacal exstrophy      PONV (postoperative nausea and vomiting)      Past Surgical History:   Procedure Laterality Date     C REMV BLADDER/NODES,ILEAL CONDUIT       COLOSTOMY       HYSTERECTOMY       NEPHRECTOMY       REVISE URINARY CONDUIT N/A 7/10/2019    Procedure: Take Down Of Urinary Conduit,Creation of a New Conduit ,Looposcopy, Extensive Lysis of Adhesions and Left Ureteral Stent Exchange;  Surgeon: Akhil Causey MD;  Location: [facility]         ROS -see hpi otherwise rest is negative     OBJECTIVE:  Vitals:    02/10/20 1337   BP: 115/72   BP Location: Right arm   Patient Position: Sitting   Pulse: 100     Constitutional: healthy, alert and no distress   Cardiovascular: regular rate, normal perfusion  Respiratory: normal work of breathing  Psychiatric: mentation appears normal and affect normal/bright  Head: Normocephalic. EOMI. Moist membranes  Abdomen: Abdomen soft, non-tender. Incision well healed.  : stoma protuberant, appliance with good seal, urine yellow  NEURO: Gait normal. 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 Had new conduit created in July 2019 for stenosis and hydronephrosis of solitary left kidney. She had persistent post op nausea which has resolved. Ultrasound 12/17/19 showed stable mild hydro. Her creatinine at the time was 1.4 from 0.9. Her loopogram at the time of her last ultrasound in Aug 2019 showed free reflux.     Suspect her elevated creatinine at " her last check may have been result of nausea and dehydration. Will recheck this today with electrolytes given her femur fracture we discussed acid-base balance and she is already seeing endocrinology in the future so may need alkalinizing agents. She was encouraged to take calcium + D which she just started. Will add a B12 level since I cannot see a recent one.     -- Renal US + KUB in 12/2020   -- Will need BMP + B12 level today and in 1 year  -- Will update patient with results    Adriana Najera MD Reconstructive Urology Fellow      Again, thank you for allowing me to participate in the care of your patient.      Sincerely,    Nona Najera MD

## 2020-02-12 DIAGNOSIS — N17.9 ACUTE KIDNEY INJURY (H): Primary | ICD-10-CM

## 2020-03-02 ENCOUNTER — ANCILLARY PROCEDURE (OUTPATIENT)
Dept: GENERAL RADIOLOGY | Facility: CLINIC | Age: 50
End: 2020-03-02
Attending: UROLOGY
Payer: COMMERCIAL

## 2020-03-02 DIAGNOSIS — N17.9 ACUTE KIDNEY INJURY (H): ICD-10-CM

## 2020-03-06 NOTE — TELEPHONE ENCOUNTER
RECORDS RECEIVED FROM: Internal - Increase in creatinine [R79.89]   DATE RECEIVED: 06.22.2020   NOTES STATUS DETAILS   OFFICE NOTE from referring provider Internal 03.03.2020 Nona Najera MD   OFFICE NOTE from other specialist  N/A    *Only VASCULITIS or LUPUS gather office notes for the following N/A    *PULMONARY   N/A    *ENT N/A    *DERMATOLOGY N/A    *RHEUMATOLOGY N/A    DISCHARGE SUMMARY from hospital N/A    DISCHARGE REPORT from the ER N/A    MEDICATION LIST Internal / CE    IMAGING  (NEED IMAGES AND REPORTS)     KIDNEY CT SCAN N/A    KIDNEY ULTRASOUND Internal 12.17.2019 08.16.2019   MR ABDOMEN N/A    NUCLEAR MEDICINE RENAL N/A    LABS     CBC Care Everywhere 07.29.2019   CMP N/A    BMP Internal 02.10.2020   UA Internal 08.22.2019 07.13.2019   URINE PROTEIN Internal 08.22.2019 07.13.2019   RENAL PANEL N/A    BIOPSY     KIDNEY BIOPSY  N/A

## 2020-05-19 NOTE — PROGRESS NOTES
As patient had Video visit scheduled in March. Call to patient, she is new, will fax lab orders to her local [facility] clinic per her request.  Valeria Yip LPN  Nephrology  [phone]

## 2020-05-20 ENCOUNTER — TELEPHONE (OUTPATIENT)
Dept: NEPHROLOGY | Facility: CLINIC | Age: 50
End: 2020-05-20

## 2020-05-20 NOTE — TELEPHONE ENCOUNTER
[facility] Call Center    Phone Message    May a detailed message be left on voicemail: yes     Reason for Call: Order(s): Other:   Reason for requested: Lab order  Date needed: Prior to 6/1/20 appt  Provider name: Dr. Herron - Patient wanting lab orders faxed to local [facility]. Fax: [phone]    Please advise.  Thank you, Alissa Clemons on 5/20/2020 at 8:49 AM       Action Taken: Message routed to:  [facility] NEPHROLOGY    Travel Screening: Negative

## 2020-05-27 NOTE — TELEPHONE ENCOUNTER
Lab orders re-faxed. Spoke to Christie at [place].  Valeria Yip LPN  Nephrology  [phone]     Patient is a 33y old  Male who presents with a chief complaint of Mental retardation with unsafe dispo (02 Jun 2018 03:19)    SUBJECTIVE / OVERNIGHT EVENTS:  No events overnight. Patient with no complaints or active issues    MEDICATIONS  (STANDING):  artificial  tears Solution 1 Drop(s) Both EYES two times a day  folic acid 1 milliGRAM(s) Oral daily  heparin  Injectable 5000 Unit(s) SubCutaneous every 8 hours  PARoxetine 20 milliGRAM(s) Oral at bedtime  QUEtiapine 200 milliGRAM(s) Oral two times a day  topiramate 100 milliGRAM(s) Oral two times a day        Vital Signs Last 24 Hrs  T(C): 36.8 (05 Jun 2018 12:16), Max: 37.2 (04 Jun 2018 21:27)  T(F): 98.2 (05 Jun 2018 12:16), Max: 98.9 (04 Jun 2018 21:27)  HR: 85 (05 Jun 2018 12:16) (85 - 90)  BP: 109/63 (05 Jun 2018 12:16) (103/60 - 117/72)  BP(mean): --  RR: 17 (05 Jun 2018 12:16) (17 - 18)  SpO2: 99% (05 Jun 2018 12:16) (97% - 99%)        PHYSICAL EXAM  GENERAL: NAD, well-developed, ambulatory  HEAD:  Atraumatic, Normocephalic  EYES: EOMI, normal conjunctiva and sclera clear  CHEST/LUNG: Clear to auscultation bilaterally; No wheeze  HEART: Regular rate and rhythm; No murmurs, rubs, or gallops  ABDOMEN: Soft, Nontender, Nondistended; Bowel sounds present  EXTREMITIES:  2+ Peripheral Pulses, No clubbing, cyanosis, or edema  PSYCH: Alert, cooperative, pleasant          Consultant(s) Notes Reviewed:  Yes  Care Discussed with Consultants/Other Providers: Yes

## 2020-05-27 NOTE — TELEPHONE ENCOUNTER
Christie from [facility] is asking that the lab orders be refaxed. The orders they rcvd weren't signed by the MD and didn't include a return fax #. Please fax to  [phone]. Christie can be reached at [phone] if needed. Thanks.

## 2020-06-01 ENCOUNTER — ANCILLARY PROCEDURE (OUTPATIENT)
Dept: ULTRASOUND IMAGING | Facility: CLINIC | Age: 50
End: 2020-06-01
Attending: INTERNAL MEDICINE
Payer: COMMERCIAL

## 2020-06-01 ENCOUNTER — VIRTUAL VISIT (OUTPATIENT)
Dept: NEPHROLOGY | Facility: CLINIC | Age: 50
End: 2020-06-01
Attending: INTERNAL MEDICINE
Payer: COMMERCIAL

## 2020-06-01 DIAGNOSIS — N13.30 HYDRONEPHROSIS, UNSPECIFIED HYDRONEPHROSIS TYPE: ICD-10-CM

## 2020-06-01 DIAGNOSIS — T85.858A STENOSIS OF ILEAL CONDUIT STOMA, INITIAL ENCOUNTER: Primary | ICD-10-CM

## 2020-06-01 RX ORDER — IBUPROFEN 200 MG
CAPSULE ORAL DAILY
COMMUNITY
End: 2020-09-29

## 2020-06-01 ASSESSMENT — PAIN SCALES - GENERAL: PAINLEVEL: NO PAIN (0)

## 2020-06-01 NOTE — LETTER
"6/1/2020       RE: Karo Lindsay  [address] [address]     Dear Colleague,    Thank you for referring your patient, Karo Lindsay, to the [facility] NEPHROLOGY at [facility]. Please see a copy of my visit note below.    Karo Lindsay is a 50 year old female who is being evaluated via a billable video visit.      The patient has been notified of following:     \"This video visit will be conducted via a call between you and your physician/provider. We have found that certain health care needs can be provided without the need for an in-person physical exam.  This service lets us provide the care you need with a video conversation.  If a prescription is necessary we can send it directly to your pharmacy.  If lab work is needed we can place an order for that and you can then stop by our lab to have the test done at a later time.    Video visits are billed at different rates depending on your insurance coverage.  Please reach out to your insurance provider with any questions.    If during the course of the call the physician/provider feels a video visit is not appropriate, you will not be charged for this service.\"    Patient has given verbal consent for Video visit? Yes    How would you like to obtain your AVS? Mail a copy    Patient would like the video invitation sent by: Send to e-mail at: [email or web address]    Will anyone else be joining your video visit? No        Video-Visit Details    Type of service:  Video Visit    Video Start Time:9.36 am  Video End Time: 10.01 am    Originating Location (pt. 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 She started having recurrent kidney stones and UTI;s when she was 17. She had rt nephrectomy 15 years ago due to concerns of recurrent UTI's. Her UTI incidence was lower then however increased again to Q 6 weeks this last year where she got very sick with systemic symptoms. She was given some empiric antibiotics which she does not remember which she had to take quite frequently. She saw Dr Mondragon who recommended revision of her stoma and placed a stent in the left ureter for mild hydronephrosis in 7/19. She reports of being very sick after surgery for 4 months where she was extremely nauseas and could not eat, and had difficulty with keeping fluids in. She now has been feeling well and drinks  ounces of water every day.   She reports of not having   Patient denies any LE edema, exertional dyspnea/orthopnea/PND, dizziness, lightheadedness, nausea, vomiting or diarrhea.   Denies use of OTC NSAIDS or other non prescribed meds, recent exposure to abx or contrast, obstructive urinary symptoms, skin rashes, joint pains, fevers, passing kidney stones, recurrent sinus infections or UTI's       Assessment/Plan:     # Elevated creatinine in a solitary kidney: baseline of 0.8-0.7 mg/dl until 7/19 when she had ileostomy revision.She also had a lt ureteral stent placed at the same time and did have mild to moderate hydronephrosis which improved overtime. Since then, her creatinine continues to get worse and is now up from 1.4 mg/dl in 2/20 --> 1.7 mg/dl in 5/20. UA not very informative as its a conduit sample, Urine P/Cr ratio of 0.6 g/g.   --will obtain an US of her lt kidney ASAP.   --recommended that she continues with adequate fluid intake    # Blood pressure/volume status: pressures are wnl and patient is euvolemic.     # kidney stone: many years ago and has not had any recent episodes.     Thu Herron MD         Allergies   Allergen Reactions     Moxifloxacin Hives     Contrast Dye Other (See  "Comments)     \"I am not supposed to have because I only have one kidney.\"     Propoxyphene N-Apap Nausea and GI Disturbance     vomiting  vomiting       Penicillins Rash       brimonidine-timolol (COMBIGAN) 0.2-0.5 % ophthalmic solution, Place 1 drop into both eyes 2 times daily  brinzolamide (AZOPT) 1 % ophthalmic suspension, Place 1 drop Into the left eye 2 times daily  calcium carbonate 500 mg, elemental, (OSCAL 500) 1250 (500 Ca) MG TABS tablet, Take by mouth daily  ketorolac tromethamine (ACULAR-LS) 0.4 % SOLN ophthalmic solution, Place 1 drop into both eyes 3 times daily   prednisoLONE acetate (PRED FORTE) 1 % ophthalmic suspension, USE 1 DROP BOTH  EYES TID.  rizatriptan (MAXALT) 10 MG tablet, Take by mouth as needed  acetaminophen (TYLENOL) 325 MG tablet, Take 2 tablets (650 mg) by mouth every 4 hours as needed for mild pain or fever (Patient not taking: Reported on 9/17/2019)  ferrous sulfate (FEROSUL) 325 (65 Fe) MG tablet, Take 1 tablet (325 mg) by mouth Every Mon, Wed, Fri Morning Start this medication one week from discharge from hospital (7/29/19). (Patient not taking: Reported on 9/17/2019)  HYDROmorphone (DILAUDID) 2 MG tablet, Take 1-2 tablets (2-4 mg) by mouth every 6 hours as needed for severe pain (Patient not taking: Reported on 9/17/2019)  ibuprofen (ADVIL/MOTRIN) 600 MG tablet, Take 1 tablet (600 mg) by mouth every 6 hours as needed for moderate pain (Patient not taking: Reported on 9/17/2019)  levofloxacin (LEVAQUIN) 250 MG tablet, Take 1 tablet (250 mg) by mouth daily (Patient not taking: Reported on 9/17/2019)  LORazepam (ATIVAN) 0.5 MG tablet, Take 0.5 mg by mouth as needed (Uses for flying)   ondansetron (ZOFRAN-ODT) 8 MG ODT tab, Place 1 tablet (8 mg) under the tongue every 8 hours as needed for nausea (Patient not taking: Reported on 2/10/2020)  order for DME, Equipment being ordered: Walker Wheels () and Walker ()  Treatment Diagnosis: impaired functional gait (Patient not " taking: Reported on 9/17/2019)  senna-docusate (SENOKOT-S/PERICOLACE) 8.6-50 MG tablet, Take 2 tablets by mouth 2 times daily as needed for constipation (Take as needed for constipation while taking narcotic pain medications. Stop if elevated ileostomy output (>2 L / 24 hrs) occurs.) (Patient not taking: Reported on 9/17/2019)    No current facility-administered medications on file prior to visit.       Past Medical History:   Diagnosis Date     Chronic UTI      Cloacal exstrophy      Femur fracture, right (H) 12/30/2019     Osteoporosis 01/13/2020     PONV (postoperative nausea and vomiting)        Past Surgical History:   Procedure Laterality Date     C REMV BLADDER/NODES,ILEAL CONDUIT       COLOSTOMY       HYSTERECTOMY       NEPHRECTOMY       REVISE URINARY CONDUIT N/A 7/10/2019    Procedure: Take Down Of Urinary Conduit,Creation of a New Conduit ,Looposcopy, Extensive Lysis of Adhesions and Left Ureteral Stent Exchange;  Surgeon: Akhil Causey MD;  Location:  OR       Social History     Tobacco Use     Smoking status: Never Smoker     Smokeless tobacco: Never Used   Substance Use Topics     Alcohol use: Not Currently     Drug use: Not on file       No family history on file.    ROS: A 12 system review of systems was negative other than noted here or above.     Exam:  There were no vitals taken for this visit.      Results:    No visits with results within 1 Day(s) from this visit.   Latest known visit with results is:   Orders Only on 02/10/2020   Component Date Value Ref Range Status     Sodium 02/10/2020 135  133 - 144 mmol/L Final     Potassium 02/10/2020 4.0  3.4 - 5.3 mmol/L Final     Chloride 02/10/2020 106  94 - 109 mmol/L Final     Carbon Dioxide 02/10/2020 22  20 - 32 mmol/L Final     Anion Gap 02/10/2020 8  3 - 14 mmol/L Final     Glucose 02/10/2020 121* 70 - 99 mg/dL Final     Urea Nitrogen 02/10/2020 51* 7 - 30 mg/dL Final     Creatinine 02/10/2020 1.49* 0.52 - 1.04 mg/dL Final     GFR  Estimate 02/10/2020 40* >60 mL/min/[1.73_m2] Final    Comment: Non  GFR Calc  Starting 12/18/2018, serum creatinine based estimated GFR (eGFR) will be   calculated using the Chronic Kidney Disease Epidemiology Collaboration   (CKD-EPI) equation.       GFR Estimate If Black 02/10/2020 47* >60 mL/min/[1.73_m2] Final    Comment:  GFR Calc  Starting 12/18/2018, serum creatinine based estimated GFR (eGFR) will be   calculated using the Chronic Kidney Disease Epidemiology Collaboration   (CKD-EPI) equation.       Calcium 02/10/2020 9.7  8.5 - 10.1 mg/dL Final     Vitamin B12 02/10/2020 255  193 - 986 pg/mL Final           Again, thank you for allowing me to participate in the care of your patient.      Sincerely,    Thu Herron MD

## 2020-06-01 NOTE — PROGRESS NOTES
"Karo Lindsay is a 50 year old female who is being evaluated via a billable video visit.      The patient has been notified of following:     \"This video visit will be conducted via a call between you and your physician/provider. We have found that certain health care needs can be provided without the need for an in-person physical exam.  This service lets us provide the care you need with a video conversation.  If a prescription is necessary we can send it directly to your pharmacy.  If lab work is needed we can place an order for that and you can then stop by our lab to have the test done at a later time.    Video visits are billed at different rates depending on your insurance coverage.  Please reach out to your insurance provider with any questions.    If during the course of the call the physician/provider feels a video visit is not appropriate, you will not be charged for this service.\"    Patient has given verbal consent for Video visit? Yes    How would you like to obtain your AVS? Mail a copy    Patient would like the video invitation sent by: Send to e-mail at: [email or web address]    Will anyone else be joining your video visit? No        Video-Visit Details    Type of service:  Video Visit    Video Start Time:9.36 am  Video End Time: 10.01 am    Originating Location (pt. 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 She started having recurrent kidney stones and UTI;s when she was 17. She had rt nephrectomy 15 years ago due to concerns of recurrent UTI's. Her UTI incidence was lower then however increased again to Q 6 weeks this last year where she got very sick with systemic " "symptoms. She was given some empiric antibiotics which she does not remember which she had to take quite frequently. She saw Dr Mondragon who recommended revision of her stoma and placed a stent in the left ureter for mild hydronephrosis in 7/19. She reports of being very sick after surgery for 4 months where she was extremely nauseas and could not eat, and had difficulty with keeping fluids in. She now has been feeling well and drinks  ounces of water every day.   She reports of not having   Patient denies any LE edema, exertional dyspnea/orthopnea/PND, dizziness, lightheadedness, nausea, vomiting or diarrhea.   Denies use of OTC NSAIDS or other non prescribed meds, recent exposure to abx or contrast, obstructive urinary symptoms, skin rashes, joint pains, fevers, passing kidney stones, recurrent sinus infections or UTI's       Assessment/Plan:     # Elevated creatinine in a solitary kidney: baseline of 0.8-0.7 mg/dl until 7/19 when she had ileostomy revision.She also had a lt ureteral stent placed at the same time and did have mild to moderate hydronephrosis which improved overtime. Since then, her creatinine continues to get worse and is now up from 1.4 mg/dl in 2/20 --> 1.7 mg/dl in 5/20. UA not very informative as its a conduit sample, Urine P/Cr ratio of 0.6 g/g.   --will obtain an US of her lt kidney ASAP.   --recommended that she continues with adequate fluid intake    # Blood pressure/volume status: pressures are wnl and patient is euvolemic.     # kidney stone: many years ago and has not had any recent episodes.     Thu Herron MD         Allergies   Allergen Reactions     Moxifloxacin Hives     Contrast Dye Other (See Comments)     \"I am not supposed to have because I only have one kidney.\"     Propoxyphene N-Apap Nausea and GI Disturbance     vomiting  vomiting       Penicillins Rash       brimonidine-timolol (COMBIGAN) 0.2-0.5 % ophthalmic solution, Place 1 drop into both eyes 2 times " daily  brinzolamide (AZOPT) 1 % ophthalmic suspension, Place 1 drop Into the left eye 2 times daily  calcium carbonate 500 mg, elemental, (OSCAL 500) 1250 (500 Ca) MG TABS tablet, Take by mouth daily  ketorolac tromethamine (ACULAR-LS) 0.4 % SOLN ophthalmic solution, Place 1 drop into both eyes 3 times daily   prednisoLONE acetate (PRED FORTE) 1 % ophthalmic suspension, USE 1 DROP BOTH  EYES TID.  rizatriptan (MAXALT) 10 MG tablet, Take by mouth as needed  acetaminophen (TYLENOL) 325 MG tablet, Take 2 tablets (650 mg) by mouth every 4 hours as needed for mild pain or fever (Patient not taking: Reported on 9/17/2019)  ferrous sulfate (FEROSUL) 325 (65 Fe) MG tablet, Take 1 tablet (325 mg) by mouth Every Mon, Wed, Fri Morning Start this medication one week from discharge from hospital (7/29/19). (Patient not taking: Reported on 9/17/2019)  HYDROmorphone (DILAUDID) 2 MG tablet, Take 1-2 tablets (2-4 mg) by mouth every 6 hours as needed for severe pain (Patient not taking: Reported on 9/17/2019)  ibuprofen (ADVIL/MOTRIN) 600 MG tablet, Take 1 tablet (600 mg) by mouth every 6 hours as needed for moderate pain (Patient not taking: Reported on 9/17/2019)  levofloxacin (LEVAQUIN) 250 MG tablet, Take 1 tablet (250 mg) by mouth daily (Patient not taking: Reported on 9/17/2019)  LORazepam (ATIVAN) 0.5 MG tablet, Take 0.5 mg by mouth as needed (Uses for flying)   ondansetron (ZOFRAN-ODT) 8 MG ODT tab, Place 1 tablet (8 mg) under the tongue every 8 hours as needed for nausea (Patient not taking: Reported on 2/10/2020)  order for DME, Equipment being ordered: Walker Wheels () and Walker ()  Treatment Diagnosis: impaired functional gait (Patient not taking: Reported on 9/17/2019)  senna-docusate (SENOKOT-S/PERICOLACE) 8.6-50 MG tablet, Take 2 tablets by mouth 2 times daily as needed for constipation (Take as needed for constipation while taking narcotic pain medications. Stop if elevated ileostomy output (>2 L / 24 hrs)  occurs.) (Patient not taking: Reported on 9/17/2019)    No current facility-administered medications on file prior to visit.       Past Medical History:   Diagnosis Date     Chronic UTI      Cloacal exstrophy      Femur fracture, right (H) 12/30/2019     Osteoporosis 01/13/2020     PONV (postoperative nausea and vomiting)        Past Surgical History:   Procedure Laterality Date     C REMV BLADDER/NODES,ILEAL CONDUIT       COLOSTOMY       HYSTERECTOMY       NEPHRECTOMY       REVISE URINARY CONDUIT N/A 7/10/2019    Procedure: Take Down Of Urinary Conduit,Creation of a New Conduit ,Looposcopy, Extensive Lysis of Adhesions and Left Ureteral Stent Exchange;  Surgeon: Akhil Causey MD;  Location:  OR       Social History     Tobacco Use     Smoking status: Never Smoker     Smokeless tobacco: Never Used   Substance Use Topics     Alcohol use: Not Currently     Drug use: Not on file       No family history on file.    ROS: A 12 system review of systems was negative other than noted here or above.     Exam:  There were no vitals taken for this visit.      Results:    No visits with results within 1 Day(s) from this visit.   Latest known visit with results is:   Orders Only on 02/10/2020   Component Date Value Ref Range Status     Sodium 02/10/2020 135  133 - 144 mmol/L Final     Potassium 02/10/2020 4.0  3.4 - 5.3 mmol/L Final     Chloride 02/10/2020 106  94 - 109 mmol/L Final     Carbon Dioxide 02/10/2020 22  20 - 32 mmol/L Final     Anion Gap 02/10/2020 8  3 - 14 mmol/L Final     Glucose 02/10/2020 121* 70 - 99 mg/dL Final     Urea Nitrogen 02/10/2020 51* 7 - 30 mg/dL Final     Creatinine 02/10/2020 1.49* 0.52 - 1.04 mg/dL Final     GFR Estimate 02/10/2020 40* >60 mL/min/[1.73_m2] Final    Comment: Non  GFR Calc  Starting 12/18/2018, serum creatinine based estimated GFR (eGFR) will be   calculated using the Chronic Kidney Disease Epidemiology Collaboration   (CKD-EPI) equation.       GFR  Estimate If Black 02/10/2020 47* >60 mL/min/[1.73_m2] Final    Comment:  GFR Calc  Starting 12/18/2018, serum creatinine based estimated GFR (eGFR) will be   calculated using the Chronic Kidney Disease Epidemiology Collaboration   (CKD-EPI) equation.       Calcium 02/10/2020 9.7  8.5 - 10.1 mg/dL Final     Vitamin B12 02/10/2020 255  193 - 986 pg/mL Final

## 2020-06-01 NOTE — PROGRESS NOTES
"Carmen Lindsay is a 50 year old female who is being evaluated via a billable video visit.      The patient has been notified of following:     \"This video visit will be conducted via a call between you and your physician/provider. We have found that certain health care needs can be provided without the need for an in-person physical exam.  This service lets us provide the care you need with a video conversation.  If a prescription is necessary we can send it directly to your pharmacy.  If lab work is needed we can place an order for that and you can then stop by our lab to have the test done at a later time.    Video visits are billed at different rates depending on your insurance coverage.  Please reach out to your insurance provider with any questions.    If during the course of the call the physician/provider feels a video visit is not appropriate, you will not be charged for this service.\"    Patient has given verbal consent for Video visit? Yes    How would you like to obtain your AVS? Mail a copy    Patient would like the video invitation sent by: Send to e-mail at: [email or web address] View    Will anyone else be joining your video visit? No  {If patient encounters technical issues they should call [phone] :[id number]}      Video-Visit Details    Type of service:  Video Visit    Video Start Time: {video visit start/end time:152948}  Video End Time: {video visit start/end time:152948}    Originating Location (pt. Location): {video visit patient location:916704::\"Home\"}    Distant Location (provider location):  [facility] NEPHROLOGY     Platform used for Video Visit: {Virtual Visit Platforms:329869::\"Yovia\"}    {signature options:026910}        "

## 2020-06-02 ENCOUNTER — TELEPHONE (OUTPATIENT)
Dept: NEPHROLOGY | Facility: CLINIC | Age: 50
End: 2020-06-02

## 2020-06-02 DIAGNOSIS — N13.30 HYDRONEPHROSIS, UNSPECIFIED HYDRONEPHROSIS TYPE: Primary | ICD-10-CM

## 2020-06-02 NOTE — TELEPHONE ENCOUNTER
Spoke with Karo to notify her of Dr. Herron's interpretation and recommendations. She will need lab order sent to her [facility] lab.     Dr. Najera referred her to see Nephrology, so will ask for her input on whether patient needs any further work-up.

## 2020-06-02 NOTE — TELEPHONE ENCOUNTER
----- Message from Thu Herron MD sent at 6/2/2020  1:49 PM CDT -----  Regarding: RE: Ultrasound was done yesterday  Thanks for the update Sunitha, Would you be able to tell her that she has mild hydronephrosis, somewhat improved from previous. Can she get her labs checked by the end of this week again? In the meantime she should also arrange an apt with urology to discuss if this could be contributing to her worsening kidney function since I do not identify other causes.    Thanks,    -S  ----- Message -----  From: Sunitha Crisostomo RN  Sent: 6/2/2020   9:43 AM CDT  To: Thu Herron MD  Subject: Ultrasound was done yesterday                    She called me to inform me her U/S was done yesterday at [facility]. Let me know if you need me to order or follow up with her. Thanks.  Sunitha

## 2020-06-02 NOTE — LETTER
Date: 2020    Karoneha Lindsay  [address] [address]      MRN: [id number]  : 1970    Dx:    ICD-10-CM    1. Hydronephrosis, unspecified hydronephrosis type  N13.30 Renal panel     CBC with platelets   2. Creatinine elevation  R79.89 Renal panel     CBC with platelets       Orders Placed This Encounter     Renal panel     Standing Status:   Future     Standing Expiration Date:   2020     CBC with platelets     Last Lab Result: Hemoglobin (g/dL)       Date                     Value                 2019               9.1 (L)          ----------     Standing Status:   Future     Standing Expiration Date:   2020       Fax results to [phone]    Dr. TORI Herron

## 2020-06-08 NOTE — TELEPHONE ENCOUNTER
Faxed lab order today. Some [facility] can view our lab orders, but [facility] unable to do so. Sent to her [facility].

## 2020-06-08 NOTE — TELEPHONE ENCOUNTER
Left Karo a message that lab order has been faxed today to her clinic and apologized that it was not there earlier.

## 2020-06-08 NOTE — TELEPHONE ENCOUNTER
Karo called in to report she had her primary, Dr. Adame order the chemistry panel last Friday, as the order not in on time. Can see results in care everywhere.    Dr. Herron, please review Dr. Najera's recommendations and let me know what labs you'd like ordered routinely for Karo, and she wants these faxed to Dr. Adame, so no further lab issues. Her CBC was last done on 5/27, not 6/5, as she was not aware of what had to be done. Please advise.     Name band;

## 2020-06-08 NOTE — TELEPHONE ENCOUNTER
Dr. Herron recommending 2L NS if Karo thinking dehydration could be potential cause of creatinine elevation (though improving). Karo states that she drinks 80+ ounces of water per day, and prefers increasing her water intake over the next two weeks.    Dr. Herron then recommending for a BMP, urine osmolality, and urine sodium to be done in two weeks.    Will order and fax to [facility].

## 2020-06-22 ENCOUNTER — PRE VISIT (OUTPATIENT)
Dept: NEPHROLOGY | Facility: CLINIC | Age: 50
End: 2020-06-22

## 2020-09-15 ENCOUNTER — TELEPHONE (OUTPATIENT)
Dept: UROLOGY | Facility: CLINIC | Age: 50
End: 2020-09-15

## 2020-09-15 DIAGNOSIS — Z98.890 HISTORY OF URINARY DIVERSION PROCEDURE: ICD-10-CM

## 2020-09-15 DIAGNOSIS — Q64.12 CLOACAL EXSTROPHY OF URINARY BLADDER: Primary | ICD-10-CM

## 2020-09-15 NOTE — TELEPHONE ENCOUNTER
It would be helpful to have her keep a voiding diary for 3d so we can quantify exactly how much urine. Also, does she have an appointment with us or nephrology soon? We can make an appointment and get her labs and ultrasound sooner.    Dr. lEizabeth Chino

## 2020-09-15 NOTE — TELEPHONE ENCOUNTER
Hi Dr. Causey,    Patient states that she is has had Nocturia for the last few nights. She states that her drinking intake is still normal. She states that she does not feel like she has a UTI. please advise.    Thanks, Savita Lambert, CMA

## 2020-09-15 NOTE — TELEPHONE ENCOUNTER
[facility] Call Center    Phone Message    May a detailed message be left on voicemail: no     Reason for Call: Other: Pt reports an abnormal amount of urine output, especially at night. Pt drinks a lot under normal circumstances but nothing abnormal recently. Pt reports this started 3-4 nights ago, and denies any burning/pain, or abnormal color/red in urine. Please call Pt back.     Action Taken: Message routed to:  [facility]    Travel Screening: Not Applicable

## 2020-09-15 NOTE — TELEPHONE ENCOUNTER
Message left on the patient's voicemail stating that it would be helpful to have her keep a voiding diary for 3d so we can quantify exactly how much urine.  Does she have an appointment with us or nephrology soon? We can make an appointment and get her labs and ultrasound sooner.      Savita Lambert MA

## 2020-09-16 NOTE — TELEPHONE ENCOUNTER
Hi all,    Please call patient to coordinate a follow up with Dr. Elizabeth Chino with labs and imaging prior. It should be sooner than December 2020. Next available.    Thanks, Savita Lambert MA

## 2020-09-16 NOTE — TELEPHONE ENCOUNTER
Patient was notified that it would be helpful to have her keep a voiding diary for 3d so we can quantify exactly how much urine.  We can make an appointment and get her labs and ultrasound sooner. Patient agreed with the plan.      Voiding diaries was emailed to the patient at [email or web address].    Patient will schedule a lab,radiology and follow up with Dr. Akhil orellana fellow Dr. Elizabeth Chino.    Savita Lambert MA

## 2020-09-16 NOTE — TELEPHONE ENCOUNTER
Left message for patient to schedule appointment with Dr Elizabeth Chino with labs and ultrasound prior.

## 2020-09-21 ENCOUNTER — PRE VISIT (OUTPATIENT)
Dept: UROLOGY | Facility: CLINIC | Age: 50
End: 2020-09-21

## 2020-09-21 NOTE — TELEPHONE ENCOUNTER
Visit Type : Clinic-Return    Hx/Sx: Cloacal exstrophy of urinary bladder     Records/Orders: Imaging scheduled     Pt Contacted: n/a    At Rooming: Ask for bladder diaries

## 2020-09-28 ENCOUNTER — OFFICE VISIT (OUTPATIENT)
Dept: UROLOGY | Facility: CLINIC | Age: 50
End: 2020-09-28
Payer: COMMERCIAL

## 2020-09-28 ENCOUNTER — ANCILLARY PROCEDURE (OUTPATIENT)
Dept: ULTRASOUND IMAGING | Facility: CLINIC | Age: 50
End: 2020-09-28
Attending: UROLOGY
Payer: COMMERCIAL

## 2020-09-28 VITALS — SYSTOLIC BLOOD PRESSURE: 109 MMHG | HEART RATE: 85 BPM | DIASTOLIC BLOOD PRESSURE: 70 MMHG

## 2020-09-28 DIAGNOSIS — N13.5 STRICTURE OR KINKING OF URETER: Primary | ICD-10-CM

## 2020-09-28 DIAGNOSIS — Q64.12 CLOACAL EXSTROPHY OF URINARY BLADDER: ICD-10-CM

## 2020-09-28 DIAGNOSIS — Z98.890 HISTORY OF URINARY DIVERSION PROCEDURE: ICD-10-CM

## 2020-09-28 LAB
ALBUMIN SERPL-MCNC: 3.6 G/DL (ref 3.4–5)
ANION GAP SERPL CALCULATED.3IONS-SCNC: 6 MMOL/L (ref 3–14)
BUN SERPL-MCNC: 60 MG/DL (ref 7–30)
CALCIUM SERPL-MCNC: 8.6 MG/DL (ref 8.5–10.1)
CHLORIDE SERPL-SCNC: 113 MMOL/L (ref 94–109)
CO2 SERPL-SCNC: 16 MMOL/L (ref 20–32)
CREAT SERPL-MCNC: 1.73 MG/DL (ref 0.52–1.04)
ERYTHROCYTE [DISTWIDTH] IN BLOOD BY AUTOMATED COUNT: 14 % (ref 10–15)
GFR SERPL CREATININE-BSD FRML MDRD: 33 ML/MIN/{1.73_M2}
GLUCOSE SERPL-MCNC: 105 MG/DL (ref 70–99)
HCT VFR BLD AUTO: 38 % (ref 35–47)
HGB BLD-MCNC: 12.9 G/DL (ref 11.7–15.7)
MCH RBC QN AUTO: 29.5 PG (ref 26.5–33)
MCHC RBC AUTO-ENTMCNC: 33.9 G/DL (ref 31.5–36.5)
MCV RBC AUTO: 87 FL (ref 78–100)
PHOSPHATE SERPL-MCNC: 2.5 MG/DL (ref 2.5–4.5)
PLATELET # BLD AUTO: 317 10E9/L (ref 150–450)
POTASSIUM SERPL-SCNC: 3.4 MMOL/L (ref 3.4–5.3)
RBC # BLD AUTO: 4.37 10E12/L (ref 3.8–5.2)
SODIUM SERPL-SCNC: 136 MMOL/L (ref 133–144)
WBC # BLD AUTO: 11.8 10E9/L (ref 4–11)

## 2020-09-28 ASSESSMENT — PAIN SCALES - GENERAL: PAINLEVEL: NO PAIN (0)

## 2020-09-28 NOTE — LETTER
September 29, 2020      Karo Lindsay  [address] [address]        Dear ,    We are writing to inform you of your test results. Your kidney function remains about the same as the last time however your bicarbonate levels are low. This can happen if you have increased output from your ileal conduit. We should schedule an appointment for you to discuss more.         Resulted Orders   CBC with platelets   Result Value Ref Range    WBC 11.8 (H) 4.0 - 11.0 10e9/L    RBC Count 4.37 3.8 - 5.2 10e12/L    Hemoglobin 12.9 11.7 - 15.7 g/dL    Hematocrit 38.0 35.0 - 47.0 %    MCV 87 78 - 100 fl    MCH 29.5 26.5 - 33.0 pg    MCHC 33.9 31.5 - 36.5 g/dL    RDW 14.0 10.0 - 15.0 %    Platelet Count 317 150 - 450 10e9/L   Renal panel   Result Value Ref Range    Sodium 136 133 - 144 mmol/L    Potassium 3.4 3.4 - 5.3 mmol/L    Chloride 113 (H) 94 - 109 mmol/L    Carbon Dioxide 16 (L) 20 - 32 mmol/L    Anion Gap 6 3 - 14 mmol/L    Glucose 105 (H) 70 - 99 mg/dL    Urea Nitrogen 60 (H) 7 - 30 mg/dL    Creatinine 1.73 (H) 0.52 - 1.04 mg/dL    GFR Estimate 33 (L) >60 mL/min/[1.73_m2]      Comment:      Non  GFR Calc  Starting 12/18/2018, serum creatinine based estimated GFR (eGFR) will be   calculated using the Chronic Kidney Disease Epidemiology Collaboration   (CKD-EPI) equation.      GFR Estimate If Black 39 (L) >60 mL/min/[1.73_m2]      Comment:       GFR Calc  Starting 12/18/2018, serum creatinine based estimated GFR (eGFR) will be   calculated using the Chronic Kidney Disease Epidemiology Collaboration   (CKD-EPI) equation.      Calcium 8.6 8.5 - 10.1 mg/dL    Phosphorus 2.5 2.5 - 4.5 mg/dL    Albumin 3.6 3.4 - 5.0 g/dL       It was a pleasure to see you at your recent visit. Please contact us at [phone] if you have any questions or concerns. Thank you, we look forward to seeing you at your next visit.       Sincerely,    Thu Herron MD.   of  Medicine  Division of Kidney Disease and Hypertension  [address] [address]

## 2020-09-28 NOTE — PROGRESS NOTES
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 She had a normally positioned left kidney and a right pelvic kidney.   She did well until the age of 17 years - recurrent UTIs and pyelo with nephrolithiasis. She had multiple stone procedures. A MAG3 renogram revealed 13% function of her right kidney.   Right nephrectomy was performed in 2005.  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   Persistent nausea and emesis post-op - Saw Dr. Mattson 12/2019, Cr up to 1.4 (baseline 0.9). RBUS that showed stable mild-mod hydro.     Today she reports doing fine. A few weeks ago, she thought was making a lot of urine at nighttime, but that seemed to resolve. Per voiding diary, she does drink a lot in the evening and when she wakes up at night to empty her stoma appliance every 2-3 hours which may be related to her increased urine output. Denies nausea, flank pain, Urine is clear. Her stoma appliance stays for a 5d at a time.     Creatinine Trend  12/19: 1.4  2/20: 1.49  6/20: 1.5 --> 1.73  9/28: 1.73    RBUS 9/28:   IMPRESSION:  1. Mild hydronephrosis which is similar to prior.  2. Anechoic area in the superior pole of the left kidney felt most likely to be a small calyceal diverticulum rather than a simple cyst.  3. Unchanged echogenic focus at the inferior pole, most suggestive of a small angiomyolipoma.  4. Previously noted nonobstructing renal calculi is not definitely conspicuous on current exam.    Current Outpatient Medications   Medication Sig Dispense Refill     brinzolamide (AZOPT) 1 % ophthalmic suspension Place 1 drop Into the  left eye 2 times daily       ketorolac tromethamine (ACULAR-LS) 0.4 % SOLN ophthalmic solution Place 1 drop into both eyes 3 times daily        prednisoLONE acetate (PRED FORTE) 1 % ophthalmic suspension USE 1 DROP BOTH  EYES TID.       rizatriptan (MAXALT) 10 MG tablet Take by mouth as needed       acetaminophen (TYLENOL) 325 MG tablet Take 2 tablets (650 mg) by mouth every 4 hours as needed for mild pain or fever (Patient not taking: Reported on 9/17/2019) 100 tablet 0     brimonidine-timolol (COMBIGAN) 0.2-0.5 % ophthalmic solution Place 1 drop into both eyes 2 times daily       calcium carbonate 500 mg, elemental, (OSCAL 500) 1250 (500 Ca) MG TABS tablet Take by mouth daily       ferrous sulfate (FEROSUL) 325 (65 Fe) MG tablet Take 1 tablet (325 mg) by mouth Every Mon, Wed, Fri Morning Start this medication one week from discharge from hospital (7/29/19). (Patient not taking: Reported on 9/17/2019) 60 tablet 3     HYDROmorphone (DILAUDID) 2 MG tablet Take 1-2 tablets (2-4 mg) by mouth every 6 hours as needed for severe pain (Patient not taking: Reported on 9/17/2019) 12 tablet 0     ibuprofen (ADVIL/MOTRIN) 600 MG tablet Take 1 tablet (600 mg) by mouth every 6 hours as needed for moderate pain (Patient not taking: Reported on 9/17/2019) 100 tablet 0     levofloxacin (LEVAQUIN) 250 MG tablet Take 1 tablet (250 mg) by mouth daily (Patient not taking: Reported on 9/17/2019) 3 tablet 0     LORazepam (ATIVAN) 0.5 MG tablet Take 0.5 mg by mouth as needed (Uses for flying)        ondansetron (ZOFRAN-ODT) 8 MG ODT tab Place 1 tablet (8 mg) under the tongue every 8 hours as needed for nausea (Patient not taking: Reported on 2/10/2020) 20 tablet 1     order for DME Equipment being ordered: Walker Wheels () and Walker ()  Treatment Diagnosis: impaired functional gait (Patient not taking: Reported on 9/17/2019) 1 each 0     senna-docusate (SENOKOT-S/PERICOLACE) 8.6-50 MG tablet Take 2 tablets by mouth 2 times  "daily as needed for constipation (Take as needed for constipation while taking narcotic pain medications. Stop if elevated ileostomy output (>2 L / 24 hrs) occurs.) (Patient not taking: Reported on 9/17/2019) 30 tablet 0       Allergies   Allergen Reactions     Moxifloxacin Hives     Contrast Dye Other (See Comments)     \"I am not supposed to have because I only have one kidney.\"     Propoxyphene N-Apap Nausea and GI Disturbance     vomiting  vomiting       Penicillins Rash     Past Medical History:   Diagnosis Date     Chronic UTI      Cloacal exstrophy      Femur fracture, right (H) 12/30/2019     Osteoporosis 01/13/2020     PONV (postoperative nausea and vomiting)      Past Surgical History:   Procedure Laterality Date     C REMV BLADDER/NODES,ILEAL CONDUIT       COLOSTOMY       HYSTERECTOMY       NEPHRECTOMY       REVISE URINARY CONDUIT N/A 7/10/2019    Procedure: Take Down Of Urinary Conduit,Creation of a New Conduit ,Looposcopy, Extensive Lysis of Adhesions and Left Ureteral Stent Exchange;  Surgeon: Akhil Causey MD;  Location: [facility] OR     ROS - see hpi otherwise rest is negative     OBJECTIVE:  Vitals:    09/28/20 1632   BP: 109/70   BP Location: Left arm   Patient Position: Sitting   Pulse: 85     Constitutional: healthy, alert and no distress   Respiratory: normal work of breathing  Psychiatric: mentation appears normal and affect normal/bright  Head: Normocephalic  Abdomen: Abdomen soft, non-tender.   : RLQ stoma pink and moist, colostomy productive.  SKIN: Soft, dry    LABS:   Creatinine   Date Value Ref Range Status   09/28/2020 1.73 (H) 0.52 - 1.04 mg/dL Final       Assessment/Plan: 50 year old female with cloacal extrophy with IC, solitary kidney and CKD, Cr stable at 1.73 with ureteral reflux. Patient had nonobstructive narrowing in the distal left ureter during loopgram 3/2020, Cr 1.5 at the time.      - Loopgram ordered to ensure no worsening stenosis at distal ureter  - Patient will leave " renal urine studies when she puts on new appliance  - If loopogram stable, follow up in 6 months with Cr, renal ultrasound  - Recommend interval follow-up with Nephrology given elevated Cr in solitary kidney, message sent to Dr. Herron re: renal follow up.     Elizabeth Chino MD  Reconstructive Urology Fellow

## 2020-09-30 NOTE — TELEPHONE ENCOUNTER
----- Message from Elizabeth Chino MD sent at 9/30/2020  1:11 PM CDT -----  Sixto Mckee,     Could we set this patient up with a loopgram in the next few weeks? She didn't answer just now, but I left a VM stating I ordered it. I was waiting on Cr which is back and still elevated, but stable.     Thanks!  Jericho

## 2020-09-30 NOTE — TELEPHONE ENCOUNTER
Message left notifying pt that one of our scheduling team would be contacting her to set up a loopogram. I also left her the phone number for the imaging department.

## 2020-10-01 ENCOUNTER — TELEPHONE (OUTPATIENT)
Dept: UROLOGY | Facility: CLINIC | Age: 50
End: 2020-10-01

## 2020-10-01 LAB
ALBUMIN UR-MCNC: 30 MG/DL
APPEARANCE UR: ABNORMAL
BACTERIA #/AREA URNS HPF: ABNORMAL /HPF
BILIRUB UR QL STRIP: NEGATIVE
COLOR UR AUTO: YELLOW
CREAT UR-MCNC: 37 MG/DL
GLUCOSE UR STRIP-MCNC: NEGATIVE MG/DL
HGB UR QL STRIP: ABNORMAL
KETONES UR STRIP-MCNC: NEGATIVE MG/DL
LEUKOCYTE ESTERASE UR QL STRIP: ABNORMAL
NITRATE UR QL: NEGATIVE
PH UR STRIP: 7 PH (ref 5–7)
PROT UR-MCNC: 0.7 G/L
PROT/CREAT 24H UR: 1.87 G/G CR (ref 0–0.2)
RBC #/AREA URNS AUTO: 17 /HPF (ref 0–2)
SOURCE: ABNORMAL
SP GR UR STRIP: 1.01 (ref 1–1.03)
UROBILINOGEN UR STRIP-MCNC: 0 MG/DL (ref 0–2)
WBC #/AREA URNS AUTO: 39 /HPF (ref 0–5)

## 2020-10-01 PROCEDURE — 81001 URINALYSIS AUTO W/SCOPE: CPT | Performed by: PATHOLOGY

## 2020-10-01 PROCEDURE — 84156 ASSAY OF PROTEIN URINE: CPT | Mod: 90 | Performed by: PATHOLOGY

## 2020-10-01 PROCEDURE — 82570 ASSAY OF URINE CREATININE: CPT | Mod: 90 | Performed by: PATHOLOGY

## 2020-10-01 NOTE — TELEPHONE ENCOUNTER
Left message for pt to call and schedule a loopogram. Left image scheduling number [phone]. Per Dr. Chino.

## 2020-10-01 NOTE — TELEPHONE ENCOUNTER
----- Message from Rylee Tapia CMA sent at 9/30/2020  1:18 PM CDT -----  Regarding: Please call to isaak    ----- Message -----  From: Elizabeth hCino MD  Sent: 9/30/2020   1:11 PM CDT  To: Rylee Tapia CMA, JORGE Yanez,     Could we set this patient up with a loopgram in the next few weeks? She didn't answer just now, but I left a VM stating I ordered it. I was waiting on Cr which is back and still elevated, but stable.     Thanks!  Jericho

## 2020-10-02 ENCOUNTER — TELEPHONE (OUTPATIENT)
Dept: UROLOGY | Facility: CLINIC | Age: 50
End: 2020-10-02

## 2020-10-02 NOTE — TELEPHONE ENCOUNTER
[facility] Call Center    Phone Message    May a detailed message be left on voicemail: yes     Reason for Call: pt left a urine sample on 10/1 with the lab, and she has scheduled a loopgram, FYI only     Action Taken: Message routed to:  [facility] ([facility]): uro    Travel Screening: Not Applicable

## 2020-10-02 NOTE — TELEPHONE ENCOUNTER
Called patient and left message just wanted to check on what she was relaying to us Cristel Bell, MASHAN Staff Nurse

## 2020-10-05 ENCOUNTER — TELEPHONE (OUTPATIENT)
Dept: NEPHROLOGY | Facility: CLINIC | Age: 50
End: 2020-10-05

## 2020-10-05 NOTE — TELEPHONE ENCOUNTER
----- Message from Elizabeth Chino MD sent at 10/1/2020  5:09 PM CDT -----  Thanks Dr. Montiel! Just to clarify, her Cr was 1.73 first at the end of June, so there is no acute increase. I just wanted to plug her again with us to hopefully prevent it from rising in the future. I am getting a loopogram to see if she has any obstruction.    ThanksJericho  ----- Message -----  From: Thu Herron MD  Sent: 10/1/2020   3:20 PM CDT  To: Elizabeth Chino MD, Sunitha Crisostomo RN    Thank you Dr Luis for your note.       Hi Sunitha,  Can you check with her what her blood pressures are like and if she has any orthostatic symptoms? If she does not have symptoms of hypervolemia, we could consider giving her 2L NS to see whether its volume depletion that is causing somewhat rise in her creatinine. Also, could you ask her if she has diarrhea?     ThanksThu       ----- Message -----  From: Elizabeth Chino MD  Sent: 9/29/2020   8:55 AM CDT  To: Thu Herron MD    HI Karo Olson saw me yesterday for a follow up for her ileal conduit. She was reporting increased urine output at night, but that seemed to improve and per her voiding diary, she drinks fluid in the evening and when she wakes up to empty her appliance. Her Cr has been rising since last December from 1/4 --> 1.73 on labs today with a stable ultrasound showing mild hydro (likely secondary to reflux from her ileal conduit which was demonstrated on loopogram 8/2019). We can repeat it to confirm, but not sure it will be different.     She is concerned about her rising Cr and I was wondering if you had any recommendations? A UA and protein is pending on her.     I appreciate your help.     Thank you,   Jericho matthews

## 2020-10-05 NOTE — TELEPHONE ENCOUNTER
Sixto Helton,     Can you check with her what her blood pressures are like and if she has any orthostatic symptoms? If she does not have symptoms of hypervolemia, we could consider giving her 2L NS to see whether its volume depletion that is causing somewhat rise in her creatinine. Also, could you ask her if she has diarrhea?     Thanks,     Thu

## 2020-10-05 NOTE — TELEPHONE ENCOUNTER
"Called patient to see what her blood pressure readings have been. Patient states she has been taking them and they are low but that is normal for her. Did not have any specific readings to report. Patient does not have diarrhea, SOB, weight gain or swelling. Patient states she feels \"pretty good.\"   Patient will call office with further questions or concerns.   "

## 2020-10-15 ENCOUNTER — HOSPITAL ENCOUNTER (OUTPATIENT)
Age: 50
Discharge: LEFT BEFORE DISCHARGE | End: 2020-10-15

## 2020-10-22 ENCOUNTER — ANCILLARY PROCEDURE (OUTPATIENT)
Dept: GENERAL RADIOLOGY | Facility: CLINIC | Age: 50
End: 2020-10-22
Attending: STUDENT IN AN ORGANIZED HEALTH CARE EDUCATION/TRAINING PROGRAM
Payer: COMMERCIAL

## 2020-10-22 DIAGNOSIS — N13.5 STRICTURE OR KINKING OF URETER: ICD-10-CM

## 2020-10-22 PROCEDURE — 50690 INJECTION FOR URETER X-RAY: CPT | Mod: GC | Performed by: RADIOLOGY

## 2020-10-22 PROCEDURE — 74425 UROGRAPHY ANTEGRADE RS&I: CPT | Mod: GC | Performed by: RADIOLOGY

## 2020-12-31 NOTE — PROGRESS NOTES
Patient requests follow-up labs, is CKD 3. Reviewed with Dr. Herron and she recommends routine protocol labs.   Ordering those for [facility] due to lab orders going to outside lab issues in the past.  Notified Karo that she can schedule these at any time at [facility] lab.     Intact

## 2021-05-28 ENCOUNTER — RECORDS - HEALTHEAST (OUTPATIENT)
Dept: ADMINISTRATIVE | Facility: CLINIC | Age: 51
End: 2021-05-28

## 2021-05-29 ENCOUNTER — RECORDS - HEALTHEAST (OUTPATIENT)
Dept: ADMINISTRATIVE | Facility: CLINIC | Age: 51
End: 2021-05-29

## 2021-12-15 ENCOUNTER — TRANSCRIBE ORDERS (OUTPATIENT)
Dept: OTHER | Age: 51
End: 2021-12-15
Payer: COMMERCIAL

## 2021-12-15 DIAGNOSIS — N18.30 CKD (CHRONIC KIDNEY DISEASE) STAGE 3, GFR 30-59 ML/MIN (H): Primary | ICD-10-CM

## 2022-01-06 ENCOUNTER — LAB (OUTPATIENT)
Dept: LAB | Facility: CLINIC | Age: 52
End: 2022-01-06
Attending: INTERNAL MEDICINE
Payer: COMMERCIAL

## 2022-01-06 ENCOUNTER — OFFICE VISIT (OUTPATIENT)
Dept: NEPHROLOGY | Facility: CLINIC | Age: 52
End: 2022-01-06
Attending: INTERNAL MEDICINE
Payer: COMMERCIAL

## 2022-01-06 VITALS
HEIGHT: 59 IN | WEIGHT: 156 LBS | DIASTOLIC BLOOD PRESSURE: 79 MMHG | BODY MASS INDEX: 31.45 KG/M2 | SYSTOLIC BLOOD PRESSURE: 119 MMHG | HEART RATE: 83 BPM

## 2022-01-06 DIAGNOSIS — N18.31 STAGE 3A CHRONIC KIDNEY DISEASE (H): Primary | ICD-10-CM

## 2022-01-06 LAB
ALBUMIN SERPL-MCNC: 3.7 G/DL (ref 3.4–5)
ALBUMIN UR-MCNC: 30 MG/DL
AMORPH CRY #/AREA URNS HPF: ABNORMAL /HPF
ANION GAP SERPL CALCULATED.3IONS-SCNC: 11 MMOL/L (ref 3–14)
APPEARANCE UR: ABNORMAL
BILIRUB UR QL STRIP: NEGATIVE
BUN SERPL-MCNC: 50 MG/DL (ref 7–30)
CALCIUM SERPL-MCNC: 8.5 MG/DL (ref 8.5–10.1)
CHLORIDE BLD-SCNC: 110 MMOL/L (ref 94–109)
CO2 SERPL-SCNC: 18 MMOL/L (ref 20–32)
COLOR UR AUTO: YELLOW
CREAT SERPL-MCNC: 1.33 MG/DL (ref 0.52–1.04)
CREAT UR-MCNC: 39 MG/DL
DEPRECATED CALCIDIOL+CALCIFEROL SERPL-MC: 22 UG/L (ref 20–75)
ERYTHROCYTE [DISTWIDTH] IN BLOOD BY AUTOMATED COUNT: 13.3 % (ref 10–15)
GFR SERPL CREATININE-BSD FRML MDRD: 48 ML/MIN/1.73M2
GLUCOSE BLD-MCNC: 110 MG/DL (ref 70–99)
GLUCOSE UR STRIP-MCNC: NEGATIVE MG/DL
HCT VFR BLD AUTO: 38.7 % (ref 35–47)
HGB BLD-MCNC: 12.8 G/DL (ref 11.7–15.7)
HGB UR QL STRIP: ABNORMAL
KETONES UR STRIP-MCNC: NEGATIVE MG/DL
LEUKOCYTE ESTERASE UR QL STRIP: ABNORMAL
MCH RBC QN AUTO: 29 PG (ref 26.5–33)
MCHC RBC AUTO-ENTMCNC: 33.1 G/DL (ref 31.5–36.5)
MCV RBC AUTO: 88 FL (ref 78–100)
NITRATE UR QL: NEGATIVE
PH UR STRIP: 8 [PH] (ref 5–7)
PHOSPHATE SERPL-MCNC: 3.2 MG/DL (ref 2.5–4.5)
PLATELET # BLD AUTO: 270 10E3/UL (ref 150–450)
POTASSIUM BLD-SCNC: 3.7 MMOL/L (ref 3.4–5.3)
PROT UR-MCNC: 0.49 G/L
PROT/CREAT 24H UR: 1.26 G/G CR (ref 0–0.2)
PTH-INTACT SERPL-MCNC: 27 PG/ML (ref 18–80)
RBC # BLD AUTO: 4.42 10E6/UL (ref 3.8–5.2)
RBC URINE: 9 /HPF
SODIUM SERPL-SCNC: 139 MMOL/L (ref 133–144)
SP GR UR STRIP: 1.01 (ref 1–1.03)
SQUAMOUS EPITHELIAL: <1 /HPF
TRI-PHOS CRY #/AREA URNS HPF: ABNORMAL /HPF
UROBILINOGEN UR STRIP-MCNC: NORMAL MG/DL
WBC # BLD AUTO: 9.5 10E3/UL (ref 4–11)
WBC CLUMPS #/AREA URNS HPF: PRESENT /HPF
WBC URINE: 51 /HPF

## 2022-01-06 PROCEDURE — 99214 OFFICE O/P EST MOD 30 MIN: CPT | Performed by: INTERNAL MEDICINE

## 2022-01-06 PROCEDURE — 36415 COLL VENOUS BLD VENIPUNCTURE: CPT | Performed by: PATHOLOGY

## 2022-01-06 ASSESSMENT — PAIN SCALES - GENERAL: PAINLEVEL: NO PAIN (0)

## 2022-01-06 ASSESSMENT — MIFFLIN-ST. JEOR: SCORE: 1223.24

## 2022-01-06 NOTE — LETTER
"1/6/2022     RE: Karo Lindsay  [address] [address]     Dear Colleague,    Thank you for referring your patient, Karo Lindsay, to the [facility] [place] [facility]. Please see a copy of my visit note below.      Nephrology Progress Note   January 6, 2022      Karo Lindsay MRN:[id number] YOB: 1970  Date of Admission:(Not on file)  Primary care provider: Jamey Adame  Requesting physician: Thu Herron, *      REASON FOR CONSULT: CKD stage 3, Solitary kidney       HISTORY OF PRESENT ILLNESS:      PAST MEDICAL HISTORY:  Reviewed with patient on 01/06/2022   As per HPI    MEDICATIONS:  Reviewed with the patient in detail    ALLERGIES:    Reviewed with the patient in detail    REVIEW OF SYSTEMS:  A comprehensive of systems was negative except as noted above.    SOCIAL HISTORY:   Reviewed with patient, no smoking and no alcohol use     FAMILY MEDICAL HISTORY:   Reviewed, no family history of need for dialysis, transplant or CKD    PHYSICAL EXAM:   Vital signs:/79 (BP Location: Right arm, Patient Position: Sitting, Cuff Size: Adult Regular)   Pulse 83   Ht 1.499 m (4' 11\")   Wt 70.8 kg (156 lb)   BMI 31.51 kg/m      Physical Exam     Gen: Appears well  Neck: No JVD  Lungs: CTA  CVS: S1S2 normal, no murmurs heard  LE: no edema  Skin: no rashes  CNS: Grossly normal     Interval history: She denies any new symptoms since I last saw her. She is a little worried about her creatinine increasing to 1.8 in 12/21. She reports that at that time, she probably had some UTI going on, however is not sure. Her creatinine has improved since.     ASSESSMENT AND RECOMMENDATIONS:     # Elevated creatinine in a solitary kidney  # Ileal conduit urostomy   # NAGMA   # h/o recurrent UTI's  # h/o kidney stones - many years ago, no recent episodes     Baseline of 0.8-0.7 mg/dl until 7/19 when she had " "ileostomy revision.She also had a lt ureteral stent placed at the same time and did have mild to moderate hydronephrosis which improved overtime, there was some narrowing of the mid left ureter. Her new baseline creatinine after that initial rise is likely 1.3-1.5 mg/dl. She has had some fluctuations/ DELMY's with a peak in Cr to 1.8 however her creatinine is now back to her baseline. I could not identify any causes for that. UA not very informative as its a conduit sample, Urine P/Cr ratio of 1.2 g/g. Her most recent renal US done 12/21. It shows persistent mild lt hydronephrosis however no other concerns. On a loopogram that was done, there is mild narrowing evident at the ureterioileal anastomosis however the contrast passed promptly and there was no concern of high grade narrowing.     Her blood pressures are at goal of < 140/90 mm of Hg and there are no signs of hypervolemia on exam. Her serum bicarbonate is lower on last few checks and her NAGMA is likely from her ureteral diversion ileostomy.     I discussed with her that her CKD is likely from past DELMY/obstructive uropathy in the setting of solitary kidney. No persistent signs of obstruction although there is some narrowing at the ureteroileal anastomosis. She is expected to have fluctuations of her creatinine given very little reserve capacity. She does not want to initiate bicarbonate tablets at this time, and will start with 1/2 tsp baking soda per day which is ~ 30 mEq of bicarbonate.   She will continue to hydrate herself.     Thu Herron MD         Allergies   Allergen Reactions     Moxifloxacin Hives     Contrast Dye Other (See Comments)     \"I am not supposed to have because I only have one kidney.\"     Propoxyphene N-Apap Nausea and GI Disturbance     vomiting  vomiting       Penicillins Rash       brimonidine-timolol (COMBIGAN) 0.2-0.5 % ophthalmic solution, Place 1 drop into both eyes 2 times daily  brinzolamide (AZOPT) 1 % ophthalmic " suspension, Place 1 drop Into the left eye 2 times daily  ketorolac tromethamine (ACULAR-LS) 0.4 % SOLN ophthalmic solution, Place 1 drop into both eyes 3 times daily   LORazepam (ATIVAN) 0.5 MG tablet, Take 0.5 mg by mouth as needed (Uses for flying)   order for DME, Equipment being ordered: Walker Wheels () and Walker ()  Treatment Diagnosis: impaired functional gait (Patient not taking: Reported on 9/17/2019)  prednisoLONE acetate (PRED FORTE) 1 % ophthalmic suspension, USE 1 DROP BOTH  EYES TID.  rizatriptan (MAXALT) 10 MG tablet, Take by mouth as needed    No current facility-administered medications on file prior to visit.      Past Medical History:   Diagnosis Date     Chronic UTI      Cloacal exstrophy      Femur fracture, right (H) 12/30/2019     Osteoporosis 01/13/2020     PONV (postoperative nausea and vomiting)        Past Surgical History:   Procedure Laterality Date     COLOSTOMY       HYSTERECTOMY       NEPHRECTOMY       REVISE URINARY CONDUIT N/A 7/10/2019    Procedure: Take Down Of Urinary Conduit,Creation of a New Conduit ,Looposcopy, Extensive Lysis of Adhesions and Left Ureteral Stent Exchange;  Surgeon: Akhil Causey MD;  Location: [facility] OR     [facility] REMV BLADDER/NODES,ILEAL CONDUIT         Social History     Tobacco Use     Smoking status: Never Smoker     Smokeless tobacco: Never Used   Substance Use Topics     Alcohol use: Not Currently     Drug use: Not on file       No family history on file.    ROS: A 12 system review of systems was negative other than noted here or above.     Exam:  There were no vitals taken for this visit.      Results:    No visits with results within 1 Day(s) from this visit.   Latest known visit with results is:   Orders Only on 02/10/2020   Component Date Value Ref Range Status     Sodium 02/10/2020 135  133 - 144 mmol/L Final     Potassium 02/10/2020 4.0  3.4 - 5.3 mmol/L Final     Chloride 02/10/2020 106  94 - 109 mmol/L Final     Carbon Dioxide  02/10/2020 22  20 - 32 mmol/L Final     Anion Gap 02/10/2020 8  3 - 14 mmol/L Final     Glucose 02/10/2020 121* 70 - 99 mg/dL Final     Urea Nitrogen 02/10/2020 51* 7 - 30 mg/dL Final     Creatinine 02/10/2020 1.49* 0.52 - 1.04 mg/dL Final     GFR Estimate 02/10/2020 40* >60 mL/min/[1.73_m2] Final    Comment: Non  GFR Calc  Starting 12/18/2018, serum creatinine based estimated GFR (eGFR) will be   calculated using the Chronic Kidney Disease Epidemiology Collaboration   (CKD-EPI) equation.       GFR Estimate If Black 02/10/2020 47* >60 mL/min/[1.73_m2] Final    Comment:  GFR Calc  Starting 12/18/2018, serum creatinine based estimated GFR (eGFR) will be   calculated using the Chronic Kidney Disease Epidemiology Collaboration   (CKD-EPI) equation.       Calcium 02/10/2020 9.7  8.5 - 10.1 mg/dL Final     Vitamin B12 02/10/2020 255  193 - 986 pg/mL Final     Again, thank you for allowing me to participate in the care of your patient.      Sincerely,    Thu Herron MD

## 2022-01-06 NOTE — PROGRESS NOTES
"  Nephrology Progress Note   January 6, 2022      Karo Lindsay MRN:[id number] YOB: 1970  Date of Admission:(Not on file)  Primary care provider: Jamey Adame  Requesting physician: Thu Herron, *      REASON FOR CONSULT: CKD stage 3, Solitary kidney       HISTORY OF PRESENT ILLNESS:      PAST MEDICAL HISTORY:  Reviewed with patient on 01/06/2022   As per HPI    MEDICATIONS:  Reviewed with the patient in detail    ALLERGIES:    Reviewed with the patient in detail    REVIEW OF SYSTEMS:  A comprehensive of systems was negative except as noted above.    SOCIAL HISTORY:   Reviewed with patient, no smoking and no alcohol use     FAMILY MEDICAL HISTORY:   Reviewed, no family history of need for dialysis, transplant or CKD    PHYSICAL EXAM:   Vital signs:/79 (BP Location: Right arm, Patient Position: Sitting, Cuff Size: Adult Regular)   Pulse 83   Ht 1.499 m (4' 11\")   Wt 70.8 kg (156 lb)   BMI 31.51 kg/m      Physical Exam     Gen: Appears well  Neck: No JVD  Lungs: CTA  CVS: S1S2 normal, no murmurs heard  LE: no edema  Skin: no rashes  CNS: Grossly normal     Interval history: She denies any new symptoms since I last saw her. She is a little worried about her creatinine increasing to 1.8 in 12/21. She reports that at that time, she probably had some UTI going on, however is not sure. Her creatinine has improved since.     ASSESSMENT AND RECOMMENDATIONS:     # Elevated creatinine in a solitary kidney  # Ileal conduit urostomy   # NAGMA   # h/o recurrent UTI's  # h/o kidney stones - many years ago, no recent episodes     Baseline of 0.8-0.7 mg/dl until 7/19 when she had ileostomy revision.She also had a lt ureteral stent placed at the same time and did have mild to moderate hydronephrosis which improved overtime, there was some narrowing of the mid left ureter. Her new baseline creatinine after that initial rise is likely 1.3-1.5 mg/dl. She has had some fluctuations/ DELMY's with a " "peak in Cr to 1.8 however her creatinine is now back to her baseline. I could not identify any causes for that. UA not very informative as its a conduit sample, Urine P/Cr ratio of 1.2 g/g. Her most recent renal US done 12/21. It shows persistent mild lt hydronephrosis however no other concerns. On a loopogram that was done, there is mild narrowing evident at the ureterioileal anastomosis however the contrast passed promptly and there was no concern of high grade narrowing.     Her blood pressures are at goal of < 140/90 mm of Hg and there are no signs of hypervolemia on exam. Her serum bicarbonate is lower on last few checks and her NAGMA is likely from her ureteral diversion ileostomy.     I discussed with her that her CKD is likely from past DELMY/obstructive uropathy in the setting of solitary kidney. No persistent signs of obstruction although there is some narrowing at the ureteroileal anastomosis. She is expected to have fluctuations of her creatinine given very little reserve capacity. She does not want to initiate bicarbonate tablets at this time, and will start with 1/2 tsp baking soda per day which is ~ 30 mEq of bicarbonate.   She will continue to hydrate herself.     Thu Herron MD         Allergies   Allergen Reactions     Moxifloxacin Hives     Contrast Dye Other (See Comments)     \"I am not supposed to have because I only have one kidney.\"     Propoxyphene N-Apap Nausea and GI Disturbance     vomiting  vomiting       Penicillins Rash       brimonidine-timolol (COMBIGAN) 0.2-0.5 % ophthalmic solution, Place 1 drop into both eyes 2 times daily  brinzolamide (AZOPT) 1 % ophthalmic suspension, Place 1 drop Into the left eye 2 times daily  ketorolac tromethamine (ACULAR-LS) 0.4 % SOLN ophthalmic solution, Place 1 drop into both eyes 3 times daily   LORazepam (ATIVAN) 0.5 MG tablet, Take 0.5 mg by mouth as needed (Uses for flying)   order for DME, Equipment being ordered: Walker Wheels () and " Aleksandr ()  Treatment Diagnosis: impaired functional gait (Patient not taking: Reported on 9/17/2019)  prednisoLONE acetate (PRED FORTE) 1 % ophthalmic suspension, USE 1 DROP BOTH  EYES TID.  rizatriptan (MAXALT) 10 MG tablet, Take by mouth as needed    No current facility-administered medications on file prior to visit.      Past Medical History:   Diagnosis Date     Chronic UTI      Cloacal exstrophy      Femur fracture, right (H) 12/30/2019     Osteoporosis 01/13/2020     PONV (postoperative nausea and vomiting)        Past Surgical History:   Procedure Laterality Date     COLOSTOMY       HYSTERECTOMY       NEPHRECTOMY       REVISE URINARY CONDUIT N/A 7/10/2019    Procedure: Take Down Of Urinary Conduit,Creation of a New Conduit ,Looposcopy, Extensive Lysis of Adhesions and Left Ureteral Stent Exchange;  Surgeon: Akhil Causey MD;  Location:  OR     [facility] REMV BLADDER/NODES,ILEAL CONDUIT         Social History     Tobacco Use     Smoking status: Never Smoker     Smokeless tobacco: Never Used   Substance Use Topics     Alcohol use: Not Currently     Drug use: Not on file       No family history on file.    ROS: A 12 system review of systems was negative other than noted here or above.     Exam:  There were no vitals taken for this visit.      Results:    No visits with results within 1 Day(s) from this visit.   Latest known visit with results is:   Orders Only on 02/10/2020   Component Date Value Ref Range Status     Sodium 02/10/2020 135  133 - 144 mmol/L Final     Potassium 02/10/2020 4.0  3.4 - 5.3 mmol/L Final     Chloride 02/10/2020 106  94 - 109 mmol/L Final     Carbon Dioxide 02/10/2020 22  20 - 32 mmol/L Final     Anion Gap 02/10/2020 8  3 - 14 mmol/L Final     Glucose 02/10/2020 121* 70 - 99 mg/dL Final     Urea Nitrogen 02/10/2020 51* 7 - 30 mg/dL Final     Creatinine 02/10/2020 1.49* 0.52 - 1.04 mg/dL Final     GFR Estimate 02/10/2020 40* >60 mL/min/[1.73_m2] Final    Comment: Non   American GFR Calc  Starting 12/18/2018, serum creatinine based estimated GFR (eGFR) will be   calculated using the Chronic Kidney Disease Epidemiology Collaboration   (CKD-EPI) equation.       GFR Estimate If Black 02/10/2020 47* >60 mL/min/[1.73_m2] Final    Comment:  GFR Calc  Starting 12/18/2018, serum creatinine based estimated GFR (eGFR) will be   calculated using the Chronic Kidney Disease Epidemiology Collaboration   (CKD-EPI) equation.       Calcium 02/10/2020 9.7  8.5 - 10.1 mg/dL Final     Vitamin B12 02/10/2020 255  193 - 986 pg/mL Final

## 2022-02-17 PROBLEM — Q64.12: Status: ACTIVE | Noted: 2019-06-04

## 2022-04-27 ENCOUNTER — LAB (OUTPATIENT)
Dept: LAB | Facility: CLINIC | Age: 52
End: 2022-04-27
Payer: COMMERCIAL

## 2022-04-27 DIAGNOSIS — N18.31 STAGE 3A CHRONIC KIDNEY DISEASE (H): ICD-10-CM

## 2022-04-27 LAB
ANION GAP SERPL CALCULATED.3IONS-SCNC: 8 MMOL/L (ref 3–14)
BUN SERPL-MCNC: 56 MG/DL (ref 7–30)
CALCIUM SERPL-MCNC: 8.9 MG/DL (ref 8.5–10.1)
CHLORIDE BLD-SCNC: 111 MMOL/L (ref 94–109)
CO2 SERPL-SCNC: 19 MMOL/L (ref 20–32)
CREAT SERPL-MCNC: 1.46 MG/DL (ref 0.52–1.04)
GFR SERPL CREATININE-BSD FRML MDRD: 43 ML/MIN/1.73M2
GLUCOSE BLD-MCNC: 115 MG/DL (ref 70–99)
POTASSIUM BLD-SCNC: 4.2 MMOL/L (ref 3.4–5.3)
SODIUM SERPL-SCNC: 138 MMOL/L (ref 133–144)

## 2022-04-27 PROCEDURE — 36415 COLL VENOUS BLD VENIPUNCTURE: CPT | Performed by: PATHOLOGY

## 2022-04-27 PROCEDURE — 80048 BASIC METABOLIC PNL TOTAL CA: CPT | Performed by: PATHOLOGY

## 2022-07-01 ENCOUNTER — TELEPHONE (OUTPATIENT)
Dept: NEPHROLOGY | Facility: CLINIC | Age: 52
End: 2022-07-01

## 2022-07-01 DIAGNOSIS — N18.31 STAGE 3A CHRONIC KIDNEY DISEASE (H): Primary | ICD-10-CM

## 2022-07-01 NOTE — TELEPHONE ENCOUNTER
Called patient with a appointment reminder for Thurs. 7/7/22 @ 9:00 am with Dr. Herron and a lab draw @ 8:00 AM.    Sim Howell on 7/1/2022 at 2:59 PM

## 2022-07-07 ENCOUNTER — OFFICE VISIT (OUTPATIENT)
Dept: NEPHROLOGY | Facility: CLINIC | Age: 52
End: 2022-07-07
Attending: INTERNAL MEDICINE
Payer: COMMERCIAL

## 2022-07-07 ENCOUNTER — LAB (OUTPATIENT)
Dept: LAB | Facility: CLINIC | Age: 52
End: 2022-07-07
Attending: INTERNAL MEDICINE
Payer: COMMERCIAL

## 2022-07-07 VITALS
DIASTOLIC BLOOD PRESSURE: 70 MMHG | HEART RATE: 81 BPM | TEMPERATURE: 97.4 F | SYSTOLIC BLOOD PRESSURE: 110 MMHG | WEIGHT: 147.4 LBS | BODY MASS INDEX: 29.77 KG/M2 | OXYGEN SATURATION: 98 %

## 2022-07-07 DIAGNOSIS — N18.32 CHRONIC KIDNEY DISEASE, STAGE 3B (H): ICD-10-CM

## 2022-07-07 DIAGNOSIS — N18.31 STAGE 3A CHRONIC KIDNEY DISEASE (H): Primary | ICD-10-CM

## 2022-07-07 DIAGNOSIS — N18.31 STAGE 3A CHRONIC KIDNEY DISEASE (H): ICD-10-CM

## 2022-07-07 LAB
ALBUMIN SERPL-MCNC: 3.4 G/DL (ref 3.4–5)
ALBUMIN UR-MCNC: 30 MG/DL
AMORPH CRY #/AREA URNS HPF: ABNORMAL /HPF
ANION GAP SERPL CALCULATED.3IONS-SCNC: 10 MMOL/L (ref 3–14)
APPEARANCE UR: ABNORMAL
BACTERIA #/AREA URNS HPF: ABNORMAL /HPF
BILIRUB UR QL STRIP: NEGATIVE
BUN SERPL-MCNC: 48 MG/DL (ref 7–30)
CALCIUM SERPL-MCNC: 9 MG/DL (ref 8.5–10.1)
CHLORIDE BLD-SCNC: 108 MMOL/L (ref 94–109)
CO2 SERPL-SCNC: 23 MMOL/L (ref 20–32)
COLOR UR AUTO: YELLOW
CREAT SERPL-MCNC: 1.42 MG/DL (ref 0.52–1.04)
CREAT UR-MCNC: 53 MG/DL
ERYTHROCYTE [DISTWIDTH] IN BLOOD BY AUTOMATED COUNT: 12.5 % (ref 10–15)
GFR SERPL CREATININE-BSD FRML MDRD: 44 ML/MIN/1.73M2
GLUCOSE BLD-MCNC: 117 MG/DL (ref 70–99)
GLUCOSE UR STRIP-MCNC: NEGATIVE MG/DL
HCT VFR BLD AUTO: 40.6 % (ref 35–47)
HGB BLD-MCNC: 13.1 G/DL (ref 11.7–15.7)
HGB UR QL STRIP: ABNORMAL
KETONES UR STRIP-MCNC: NEGATIVE MG/DL
LEUKOCYTE ESTERASE UR QL STRIP: ABNORMAL
MCH RBC QN AUTO: 29 PG (ref 26.5–33)
MCHC RBC AUTO-ENTMCNC: 32.3 G/DL (ref 31.5–36.5)
MCV RBC AUTO: 90 FL (ref 78–100)
MUCOUS THREADS #/AREA URNS LPF: PRESENT /LPF
NITRATE UR QL: POSITIVE
PH UR STRIP: 8.5 [PH] (ref 5–7)
PHOSPHATE SERPL-MCNC: 3.3 MG/DL (ref 2.5–4.5)
PLATELET # BLD AUTO: 304 10E3/UL (ref 150–450)
POTASSIUM BLD-SCNC: 4.3 MMOL/L (ref 3.4–5.3)
PROT UR-MCNC: 0.53 G/L
PROT/CREAT 24H UR: 1 G/G CR (ref 0–0.2)
RBC # BLD AUTO: 4.51 10E6/UL (ref 3.8–5.2)
RBC URINE: 7 /HPF
SODIUM SERPL-SCNC: 141 MMOL/L (ref 133–144)
SP GR UR STRIP: 1.01 (ref 1–1.03)
SQUAMOUS EPITHELIAL: <1 /HPF
TRI-PHOS CRY #/AREA URNS HPF: ABNORMAL /HPF
UROBILINOGEN UR STRIP-MCNC: NORMAL MG/DL
WBC # BLD AUTO: 9 10E3/UL (ref 4–11)
WBC CLUMPS #/AREA URNS HPF: PRESENT /HPF
WBC URINE: 25 /HPF

## 2022-07-07 PROCEDURE — G0463 HOSPITAL OUTPT CLINIC VISIT: HCPCS

## 2022-07-07 PROCEDURE — 80069 RENAL FUNCTION PANEL: CPT | Performed by: PATHOLOGY

## 2022-07-07 PROCEDURE — 81001 URINALYSIS AUTO W/SCOPE: CPT | Performed by: PATHOLOGY

## 2022-07-07 PROCEDURE — 85027 COMPLETE CBC AUTOMATED: CPT | Performed by: PATHOLOGY

## 2022-07-07 PROCEDURE — 84156 ASSAY OF PROTEIN URINE: CPT | Performed by: PATHOLOGY

## 2022-07-07 PROCEDURE — 36415 COLL VENOUS BLD VENIPUNCTURE: CPT | Performed by: PATHOLOGY

## 2022-07-07 PROCEDURE — 99214 OFFICE O/P EST MOD 30 MIN: CPT | Performed by: INTERNAL MEDICINE

## 2022-07-07 ASSESSMENT — PAIN SCALES - GENERAL: PAINLEVEL: NO PAIN (0)

## 2022-07-07 NOTE — PROGRESS NOTES
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 She started having recurrent kidney stones and UTI;s when she was 17. She had rt nephrectomy 15 years ago due to concerns of recurrent UTI's. Her UTI incidence was lower then however increased again to Q 6 weeks this last year where she got very sick with systemic symptoms. She was given some empiric antibiotics which she does not remember which she had to take quite frequently. She saw Dr Mondragon who recommended revision of her stoma and placed a stent in the left ureter for mild hydronephrosis in 7/19. She reports of being very sick after surgery for 4 months where she was extremely nauseas and could not eat, and had difficulty with keeping fluids in. She now has been feeling well and drinks  ounces of water every day.   She reports of not having   Patient denies any LE edema, exertional dyspnea/orthopnea/PND, dizziness, lightheadedness, nausea, vomiting or diarrhea.   Denies use of OTC NSAIDS or other non prescribed meds, recent exposure to abx or contrast, obstructive urinary symptoms, skin rashes, joint pains, fevers, passing kidney stones, recurrent sinus infections or UTI's     PAST MEDICAL HISTORY:  Reviewed with patient on 07/07/2022   As per HPI    MEDICATIONS:  Reviewed with the patient in detail    ALLERGIES:    Reviewed with the patient in detail    REVIEW OF SYSTEMS:  A comprehensive of systems was negative except as noted above.    SOCIAL HISTORY:   Reviewed with patient, no smoking and no alcohol use     FAMILY  MEDICAL HISTORY:   Reviewed, no family history of need for dialysis, transplant or CKD    PHYSICAL EXAM:   Vital signs:There were no vitals taken for this visit.    Physical Exam     Gen: Appears well  Neck: No JVD  Lungs: CTA  CVS: S1S2 normal, no murmurs heard  LE: no edema  Skin: no rashes  CNS: Grossly normal     Interval history: She has been doing well and denies any concerns. She has not been taking the baking soda unfortunately as she forgot. She has been keeping herself hydrated with water but she has a lot of ileostomy output and is constantly emptying her pouch.    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    Baseline of 0.8-0.7 mg/dl until 7/19 when she had ileostomy revision.She also had a lt ureteral stent placed at the same time and did have mild to moderate hydronephrosis which improved overtime, there was some narrowing of the mid left ureter. Her new baseline creatinine after that initial rise is likely 1.3-1.5 mg/dl. She has had some fluctuations/ DELMY's with a peak in Cr to 1.8 however her creatinine is now back to her baseline. I could not identify any causes for that. UA not very informative as its a conduit sample, Urine P/Cr ratio of 1.2 g/g. Her most recent renal US done 12/21. It shows persistent mild lt hydronephrosis however no other concerns. On a loopogram that was done, there is mild narrowing evident at the ureterioileal anastomosis however the contrast passed promptly and there was no concern of high grade narrowing.     Her blood pressures are at goal of < 140/90 mm of Hg and there are no signs of hypervolemia on exam. Her serum bicarbonate is lower on last few checks and her NAGMA is likely from her ureteral diversion  "ileostomy.     I discussed with her that her CKD is likely from past DELMY/obstructive uropathy in the setting of solitary kidney. No persistent signs of obstruction although there is some narrowing at the ureteroileal anastomosis. She is expected to have fluctuations of her creatinine given very little reserve capacity.     Her bicarb remains low but she does not want to initiate bicarbonate tablets at this time, so I asked her to start 1/2 tsp baking soda per day which is ~ 30 mEq of bicarbonate, however she had forgotten about it ans has not been on any bicarbonate supplementation at this time.     Her creatinine remains at baseline however I do think there is a pre renal component to it given her large urostomy output. I asked her to hydrate herself with more electrolyte solutions/broths in addition to water. She will also start taking the baking soda.     Will check labs in 6 months and f/up in clinic in 1 year    Thu Herron MD         Allergies   Allergen Reactions     Moxifloxacin Hives     Contrast Dye Other (See Comments)     \"I am not supposed to have because I only have one kidney.\"     Propoxyphene N-Apap Nausea and GI Disturbance     vomiting  vomiting       Penicillins Rash       brimonidine-timolol (COMBIGAN) 0.2-0.5 % ophthalmic solution, Place 1 drop into both eyes 2 times daily (Patient not taking: Reported on 1/6/2022)  brinzolamide (AZOPT) 1 % ophthalmic suspension, Place 1 drop Into the left eye 2 times daily (Patient not taking: Reported on 1/6/2022)  ketorolac tromethamine (ACULAR-LS) 0.4 % SOLN ophthalmic solution, Place 1 drop into both eyes 3 times daily   LORazepam (ATIVAN) 0.5 MG tablet, Take 0.5 mg by mouth as needed (Uses for flying)   order for DME, Equipment being ordered: Walker Wheels () and Walker ()  Treatment Diagnosis: impaired functional gait (Patient not taking: Reported on 9/17/2019)  prednisoLONE acetate (PRED FORTE) 1 % ophthalmic suspension, USE 1 DROP BOTH "  EYES TID.  rizatriptan (MAXALT) 10 MG tablet, Take by mouth as needed    No current facility-administered medications on file prior to visit.      Past Medical History:   Diagnosis Date     Chronic UTI      Cloacal exstrophy      Femur fracture, right (H) 12/30/2019     Osteoporosis 01/13/2020     PONV (postoperative nausea and vomiting)        Past Surgical History:   Procedure Laterality Date     COLOSTOMY       HYSTERECTOMY       NEPHRECTOMY       REVISE URINARY CONDUIT N/A 7/10/2019    Procedure: Take Down Of Urinary Conduit,Creation of a New Conduit ,Looposcopy, Extensive Lysis of Adhesions and Left Ureteral Stent Exchange;  Surgeon: Akhil Causey MD;  Location:  OR     [facility] REMV BLADDER/NODES,ILEAL CONDUIT         Social History     Tobacco Use     Smoking status: Never Smoker     Smokeless tobacco: Never Used   Substance Use Topics     Alcohol use: Not Currently       No family history on file.    ROS: A 12 system review of systems was negative other than noted here or above.     Exam:  There were no vitals taken for this visit.      Results:    No visits with results within 1 Day(s) from this visit.   Latest known visit with results is:   Orders Only on 02/10/2020   Component Date Value Ref Range Status     Sodium 02/10/2020 135  133 - 144 mmol/L Final     Potassium 02/10/2020 4.0  3.4 - 5.3 mmol/L Final     Chloride 02/10/2020 106  94 - 109 mmol/L Final     Carbon Dioxide 02/10/2020 22  20 - 32 mmol/L Final     Anion Gap 02/10/2020 8  3 - 14 mmol/L Final     Glucose 02/10/2020 121* 70 - 99 mg/dL Final     Urea Nitrogen 02/10/2020 51* 7 - 30 mg/dL Final     Creatinine 02/10/2020 1.49* 0.52 - 1.04 mg/dL Final     GFR Estimate 02/10/2020 40* >60 mL/min/[1.73_m2] Final    Comment: Non  GFR Calc  Starting 12/18/2018, serum creatinine based estimated GFR (eGFR) will be   calculated using the Chronic Kidney Disease Epidemiology Collaboration   (CKD-EPI) equation.       GFR Estimate If  Black 02/10/2020 47* >60 mL/min/[1.73_m2] Final    Comment:  GFR Calc  Starting 12/18/2018, serum creatinine based estimated GFR (eGFR) will be   calculated using the Chronic Kidney Disease Epidemiology Collaboration   (CKD-EPI) equation.       Calcium 02/10/2020 9.7  8.5 - 10.1 mg/dL Final     Vitamin B12 02/10/2020 255  193 - 986 pg/mL Final

## 2022-07-07 NOTE — PATIENT INSTRUCTIONS
--check labs in 6 months   --start taking 1/2 tsp baking soda daily  --incorporate more solutions with electrolytes, broths/soups in your diet to keep yourself hydrated

## 2022-07-07 NOTE — NURSING NOTE
Chief Complaint   Patient presents with     RECHECK       /70   Pulse 81   Temp 97.4  F (36.3  C) (Oral)   Wt 66.9 kg (147 lb 6.4 oz)   SpO2 98%   BMI 29.77 kg/m      Sim Howell on 7/7/2022 at 9:04 AM

## 2022-07-07 NOTE — LETTER
7/7/2022       RE: Karo Lindsay  [address] [address]      Dear Colleague,    Thank you for referring your patient, Karo Lindsay, to the [facility] NEPHROLOGY [facility]INIC [place] [facility]. Please see a copy of my visit note below.      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 She started having recurrent kidney stones and UTI;s when she was 17. She had rt nephrectomy 15 years ago due to concerns of recurrent UTI's. Her UTI incidence was lower then however increased again to Q 6 weeks this last year where she got very sick with systemic symptoms. She was given some empiric antibiotics which she does not remember which she had to take quite frequently. She saw Dr Mondragon who recommended revision of her stoma and placed a stent in the left ureter for mild hydronephrosis in 7/19. She reports of being very sick after surgery for 4 months where she was extremely nauseas and could not eat, and had difficulty with keeping fluids in. She now has been feeling well and drinks  ounces of water every day.   She reports of not having   Patient denies any LE edema, exertional dyspnea/orthopnea/PND, dizziness, lightheadedness, nausea, vomiting or diarrhea.   Denies use of OTC NSAIDS or other non prescribed meds, recent exposure to abx or contrast, obstructive urinary symptoms, skin rashes, joint pains, fevers, passing kidney stones, recurrent sinus infections or UTI's     PAST MEDICAL HISTORY:  Reviewed with  patient on 07/07/2022   As per HPI    MEDICATIONS:  Reviewed with the patient in detail    ALLERGIES:    Reviewed with the patient in detail    REVIEW OF SYSTEMS:  A comprehensive of systems was negative except as noted above.    SOCIAL HISTORY:   Reviewed with patient, no smoking and no alcohol use     FAMILY MEDICAL HISTORY:   Reviewed, no family history of need for dialysis, transplant or CKD    PHYSICAL EXAM:   Vital signs:There were no vitals taken for this visit.    Physical Exam     Gen: Appears well  Neck: No JVD  Lungs: CTA  CVS: S1S2 normal, no murmurs heard  LE: no edema  Skin: no rashes  CNS: Grossly normal     Interval history: She has been doing well and denies any concerns. She has not been taking the baking soda unfortunately as she forgot. She has been keeping herself hydrated with water but she has a lot of ileostomy output and is constantly emptying her pouch.    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    Baseline of 0.8-0.7 mg/dl until 7/19 when she had ileostomy revision.She also had a lt ureteral stent placed at the same time and did have mild to moderate hydronephrosis which improved overtime, there was some narrowing of the mid left ureter. Her new baseline creatinine after that initial rise is likely 1.3-1.5 mg/dl. She has had some fluctuations/ DELMY's with a peak in Cr to 1.8 however her creatinine is now back to her baseline. I could not identify any causes for that. UA not very informative as its a conduit sample, Urine P/Cr ratio of 1.2 g/g. Her most recent renal US done 12/21. It shows persistent mild lt hydronephrosis however no other concerns. On a loopogram that was done, there is mild narrowing evident at the ureterioileal  "anastomosis however the contrast passed promptly and there was no concern of high grade narrowing.     Her blood pressures are at goal of < 140/90 mm of Hg and there are no signs of hypervolemia on exam. Her serum bicarbonate is lower on last few checks and her NAGMA is likely from her ureteral diversion ileostomy.     I discussed with her that her CKD is likely from past DELMY/obstructive uropathy in the setting of solitary kidney. No persistent signs of obstruction although there is some narrowing at the ureteroileal anastomosis. She is expected to have fluctuations of her creatinine given very little reserve capacity.     Her bicarb remains low but she does not want to initiate bicarbonate tablets at this time, so I asked her to start 1/2 tsp baking soda per day which is ~ 30 mEq of bicarbonate, however she had forgotten about it ans has not been on any bicarbonate supplementation at this time.     Her creatinine remains at baseline however I do think there is a pre renal component to it given her large urostomy output. I asked her to hydrate herself with more electrolyte solutions/broths in addition to water. She will also start taking the baking soda.     Will check labs in 6 months and f/up in clinic in 1 year    Thu Herron MD         Allergies   Allergen Reactions     Moxifloxacin Hives     Contrast Dye Other (See Comments)     \"I am not supposed to have because I only have one kidney.\"     Propoxyphene N-Apap Nausea and GI Disturbance     vomiting  vomiting       Penicillins Rash       brimonidine-timolol (COMBIGAN) 0.2-0.5 % ophthalmic solution, Place 1 drop into both eyes 2 times daily (Patient not taking: Reported on 1/6/2022)  brinzolamide (AZOPT) 1 % ophthalmic suspension, Place 1 drop Into the left eye 2 times daily (Patient not taking: Reported on 1/6/2022)  ketorolac tromethamine (ACULAR-LS) 0.4 % SOLN ophthalmic solution, Place 1 drop into both eyes 3 times daily   LORazepam (ATIVAN) 0.5 MG " tablet, Take 0.5 mg by mouth as needed (Uses for flying)   order for DME, Equipment being ordered: Walker Wheels () and Walker ()  Treatment Diagnosis: impaired functional gait (Patient not taking: Reported on 9/17/2019)  prednisoLONE acetate (PRED FORTE) 1 % ophthalmic suspension, USE 1 DROP BOTH  EYES TID.  rizatriptan (MAXALT) 10 MG tablet, Take by mouth as needed    No current facility-administered medications on file prior to visit.      Past Medical History:   Diagnosis Date     Chronic UTI      Cloacal exstrophy      Femur fracture, right (H) 12/30/2019     Osteoporosis 01/13/2020     PONV (postoperative nausea and vomiting)        Past Surgical History:   Procedure Laterality Date     COLOSTOMY       HYSTERECTOMY       NEPHRECTOMY       REVISE URINARY CONDUIT N/A 7/10/2019    Procedure: Take Down Of Urinary Conduit,Creation of a New Conduit ,Looposcopy, Extensive Lysis of Adhesions and Left Ureteral Stent Exchange;  Surgeon: Akhil Causey MD;  Location: [facility] OR     [facility] REMV BLADDER/NODES,ILEAL CONDUIT         Social History     Tobacco Use     Smoking status: Never Smoker     Smokeless tobacco: Never Used   Substance Use Topics     Alcohol use: Not Currently       No family history on file.    ROS: A 12 system review of systems was negative other than noted here or above.     Exam:  There were no vitals taken for this visit.      Results:    No visits with results within 1 Day(s) from this visit.   Latest known visit with results is:   Orders Only on 02/10/2020   Component Date Value Ref Range Status     Sodium 02/10/2020 135  133 - 144 mmol/L Final     Potassium 02/10/2020 4.0  3.4 - 5.3 mmol/L Final     Chloride 02/10/2020 106  94 - 109 mmol/L Final     Carbon Dioxide 02/10/2020 22  20 - 32 mmol/L Final     Anion Gap 02/10/2020 8  3 - 14 mmol/L Final     Glucose 02/10/2020 121* 70 - 99 mg/dL Final     Urea Nitrogen 02/10/2020 51* 7 - 30 mg/dL Final     Creatinine 02/10/2020 1.49* 0.52 -  1.04 mg/dL Final     GFR Estimate 02/10/2020 40* >60 mL/min/[1.73_m2] Final    Comment: Non  GFR Calc  Starting 12/18/2018, serum creatinine based estimated GFR (eGFR) will be   calculated using the Chronic Kidney Disease Epidemiology Collaboration   (CKD-EPI) equation.       GFR Estimate If Black 02/10/2020 47* >60 mL/min/[1.73_m2] Final    Comment:  GFR Calc  Starting 12/18/2018, serum creatinine based estimated GFR (eGFR) will be   calculated using the Chronic Kidney Disease Epidemiology Collaboration   (CKD-EPI) equation.       Calcium 02/10/2020 9.7  8.5 - 10.1 mg/dL Final     Vitamin B12 02/10/2020 255  193 - 986 pg/mL Final         Again, thank you for allowing me to participate in the care of your patient.      Sincerely,    Thu Herron MD

## 2022-09-30 ENCOUNTER — TELEPHONE (OUTPATIENT)
Dept: UROLOGY | Facility: CLINIC | Age: 52
End: 2022-09-30

## 2022-10-04 ENCOUNTER — TELEPHONE (OUTPATIENT)
Dept: UROLOGY | Facility: CLINIC | Age: 52
End: 2022-10-04

## 2023-02-06 ENCOUNTER — TELEPHONE (OUTPATIENT)
Dept: NEUROSURGERY | Facility: CLINIC | Age: 53
End: 2023-02-06
Payer: COMMERCIAL

## 2023-02-06 NOTE — TELEPHONE ENCOUNTER
LMTC- received referral from [facility] for Acute Left Lumbosacral pain without radiculopathy    Referral scanned into chart  MRI was done with [facility] 1-26-23

## 2023-03-06 ENCOUNTER — OFFICE VISIT (OUTPATIENT)
Dept: NEUROSURGERY | Facility: CLINIC | Age: 53
End: 2023-03-06
Payer: COMMERCIAL

## 2023-03-06 VITALS — SYSTOLIC BLOOD PRESSURE: 126 MMHG | OXYGEN SATURATION: 97 % | HEART RATE: 93 BPM | DIASTOLIC BLOOD PRESSURE: 80 MMHG

## 2023-03-06 DIAGNOSIS — M54.50 ACUTE LEFT-SIDED LOW BACK PAIN WITHOUT SCIATICA: Primary | ICD-10-CM

## 2023-03-06 PROCEDURE — 99203 OFFICE O/P NEW LOW 30 MIN: CPT | Performed by: PHYSICIAN ASSISTANT

## 2023-03-06 ASSESSMENT — PAIN SCALES - GENERAL: PAINLEVEL: MODERATE PAIN (5)

## 2023-03-06 NOTE — LETTER
3/6/2023         RE: Karo Lindsay  [address] [place] [address]        Dear Colleague,    Thank you for referring your patient, Karo Lindsay, to the [facility] NEUROLOGY CLINICS [facility]. Please see a copy of my visit note below.    Dr. Leonides Leonardo  [facility]  Neurosurgery Clinic Visit      CC: left side back pain    Primary care Provider: Jamey Adame    Referring Provider:        Reason For Visit:   I was asked to consult on the patient for back pain.      HPI: Karo Lindsay is a 53 year old female who presents for evaluation of her chief complaint of left-sided low back pain.  She states that about 6 weeks ago, she experienced a sudden onset of left-sided low back pain, radiating from her left low back and into her left hip and thigh.  She does not have persistent sciatica down the leg.  She has not had any recent physical therapy or injections.  She has not had symptoms like this before, although she does note a history of neuropathy affecting her feet bilaterally.  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  She did have an MRI performed at an outside facility.  No bowel or bladder changes, no focal weaknesses in the lower extremities, although she has noticed a slight change in general imbalance over the last 1 to 2 years.    Past Medical History:   Diagnosis Date     Chronic UTI      Cloacal exstrophy      Femur fracture, right (H) 12/30/2019     Osteoporosis 01/13/2020     PONV (postoperative nausea and vomiting)        Past Medical History reviewed with patient during visit.    Past Surgical History:   Procedure Laterality Date     COLOSTOMY       HYSTERECTOMY       NEPHRECTOMY       REVISE URINARY CONDUIT N/A 7/10/2019    Procedure: Take Down Of Urinary Conduit,Creation of a New Conduit ,Looposcopy, Extensive Lysis of Adhesions and Left Ureteral Stent Exchange;  Surgeon: Akhil Causey MD;   "Location: [facility] OR     [facility] REMV BLADDER/NODES,ILEAL CONDUIT       Past Surgical History reviewed with patient during visit.    Current Outpatient Medications   Medication     brimonidine-timolol (COMBIGAN) 0.2-0.5 % ophthalmic solution     brinzolamide (AZOPT) 1 % ophthalmic suspension     ketorolac tromethamine (ACULAR-LS) 0.4 % SOLN ophthalmic solution     LORazepam (ATIVAN) 0.5 MG tablet     order for DME     prednisoLONE acetate (PRED FORTE) 1 % ophthalmic suspension     rizatriptan (MAXALT) 10 MG tablet     No current facility-administered medications for this visit.       Allergies   Allergen Reactions     Moxifloxacin Hives     Contrast Dye Other (See Comments)     \"I am not supposed to have because I only have one kidney.\"     Propoxyphene N-Apap Nausea and GI Disturbance     vomiting  vomiting       Penicillins Rash       Social History     Socioeconomic History     Marital status:      Spouse name: None     Number of children: None     Years of education: None     Highest education level: None   Tobacco Use     Smoking status: Never     Smokeless tobacco: Never   Substance and Sexual Activity     Alcohol use: Not Currently       No family history on file.       ROS: 10 point ROS neg other than the symptoms noted above in the HPI.    Vital Signs: /80   Pulse 93   SpO2 97%     Examination:  Constitutional:  Alert, well nourished, NAD.  HEENT: Normocephalic, atraumatic.   Pulmonary:  Without shortness of breath, normal effort.   Lymph: no lymphadenopathy to low back or LE.   Integumentary: Skin is free of rashes or lesions.   Cardiovascular:  No pitting edema of BLE.    Psych: Normal affect, no apparent distress    Neurological:  Awake  Alert  Oriented x 3  Speech clear  Cranial nerves II - XII grossly intact  Motor exam   Shoulder Abduction:  Right:  5/5   Left:  5/5  Biceps:                      Right:  5/5   Left:  5/5  Triceps:                     Right:  5/5   Left:  5/5  Wrist Extensors:     "   Right:  5/5   Left:  5/5  Wrist Flexors:           Right:  5/5   Left:  5/5  Intrinsics:                   Right:  5/5   Left:  5/5  Hip Flexor:                Right: 5/5  Left:  5/5  Hip Adductor:             Right:  5/5  Left:  5/5  Hip Abductor:             Right:  5/5  Left:  5/5  Gastroc Soleus:        Right:  5/5  Left:  5/5  Tib/Ant:                      Right:  5/5  Left:  5/5  EHL:                          Right:  5/5  Left:  5/5       Sensation normal to bilateral upper and lower extremities.    Reflexes are 2+ in the patellar and Achilles. There is no clonus. Downgoing Babinski.    Reflexes are 2+ in the brachial radialis and triceps. Negative Samina sign bilaterally.    Finger to Nose smooth  Pronator drift negative    Musculoskeletal:  Gait: Able to stand from a seated position. Normal non-antalgic, non-myelopathic gait.  Able to heel/toe walk without loss of balance    Cervical examination reveals good range of motion.  No tenderness to palpation of the cervical spine or paraspinous muscles bilaterally.    Lumbar examination reveals no tenderness of the spine or paraspinous muscles.  Hip height is symmetrical. Negative SI joint, sciatic notch or greater trochanteric tenderness to palpation bilaterally.  Straight leg raise is negative bilaterally.      Imaging:       Assessment/Plan:     Left-sided low back pain  Left hip and leg pain    Karo Lindsay is a 53 year old female.  I did have a discussion with the patient regarding her history and symptoms.  She is having left-sided low back pain, with pain that radiates into the left hip and thigh.  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  We are obtaining this MRI for further review.  In the meantime, we discussed potential benefits of starting physical therapy, which she did wish to proceed with.  We will contact her with additional follow-up recommendations as indicated, once the MRI is available for  review.  She voiced agreement and understanding.          Abdias Aguilar PA-C  [facility]  [address] [address]    Tel [phone]      The use of Dragon/BooRah dictation services may have been used to construct the content in this note; any grammatical or spelling errors are non-intentional. Please contact the author of this note directly if you are in need of any clarification.        Again, thank you for allowing me to participate in the care of your patient.        Sincerely,        Abdias Aguilar PA-C

## 2023-03-06 NOTE — PROGRESS NOTES
Dr. Leonides Leonardo  [facility]  Neurosurgery Clinic Visit      CC: left side back pain    Primary care Provider: Jamey Adame    Referring Provider:        Reason For Visit:   I was asked to consult on the patient for back pain.      HPI: Karo Lindsay is a 53 year old female who presents for evaluation of her chief complaint of left-sided low back pain.  She states that about 6 weeks ago, she experienced a sudden onset of left-sided low back pain, radiating from her left low back and into her left hip and thigh.  She does not have persistent sciatica down the leg.  She has not had any recent physical therapy or injections.  She has not had symptoms like this before, although she does note a history of neuropathy affecting her feet bilaterally.  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  She did have an MRI performed at an outside facility.  No bowel or bladder changes, no focal weaknesses in the lower extremities, although she has noticed a slight change in general imbalance over the last 1 to 2 years.    Past Medical History:   Diagnosis Date     Chronic UTI      Cloacal exstrophy      Femur fracture, right (H) 12/30/2019     Osteoporosis 01/13/2020     PONV (postoperative nausea and vomiting)        Past Medical History reviewed with patient during visit.    Past Surgical History:   Procedure Laterality Date     COLOSTOMY       HYSTERECTOMY       NEPHRECTOMY       REVISE URINARY CONDUIT N/A 7/10/2019    Procedure: Take Down Of Urinary Conduit,Creation of a New Conduit ,Looposcopy, Extensive Lysis of Adhesions and Left Ureteral Stent Exchange;  Surgeon: Akhil Causey MD;  Location: [facility] OR     [facility] REMV BLADDER/NODES,ILEAL CONDUIT       Past Surgical History reviewed with patient during visit.    Current Outpatient Medications   Medication     brimonidine-timolol (COMBIGAN) 0.2-0.5 % ophthalmic solution     brinzolamide (AZOPT) 1 %  "ophthalmic suspension     ketorolac tromethamine (ACULAR-LS) 0.4 % SOLN ophthalmic solution     LORazepam (ATIVAN) 0.5 MG tablet     order for DME     prednisoLONE acetate (PRED FORTE) 1 % ophthalmic suspension     rizatriptan (MAXALT) 10 MG tablet     No current facility-administered medications for this visit.       Allergies   Allergen Reactions     Moxifloxacin Hives     Contrast Dye Other (See Comments)     \"I am not supposed to have because I only have one kidney.\"     Propoxyphene N-Apap Nausea and GI Disturbance     vomiting  vomiting       Penicillins Rash       Social History     Socioeconomic History     Marital status:      Spouse name: None     Number of children: None     Years of education: None     Highest education level: None   Tobacco Use     Smoking status: Never     Smokeless tobacco: Never   Substance and Sexual Activity     Alcohol use: Not Currently       No family history on file.       ROS: 10 point ROS neg other than the symptoms noted above in the HPI.    Vital Signs: /80   Pulse 93   SpO2 97%     Examination:  Constitutional:  Alert, well nourished, NAD.  HEENT: Normocephalic, atraumatic.   Pulmonary:  Without shortness of breath, normal effort.   Lymph: no lymphadenopathy to low back or LE.   Integumentary: Skin is free of rashes or lesions.   Cardiovascular:  No pitting edema of BLE.    Psych: Normal affect, no apparent distress    Neurological:  Awake  Alert  Oriented x 3  Speech clear  Cranial nerves II - XII grossly intact  Motor exam   Shoulder Abduction:  Right:  5/5   Left:  5/5  Biceps:                      Right:  5/5   Left:  5/5  Triceps:                     Right:  5/5   Left:  5/5  Wrist Extensors:       Right:  5/5   Left:  5/5  Wrist Flexors:           Right:  5/5   Left:  5/5  Intrinsics:                   Right:  5/5   Left:  5/5  Hip Flexor:                Right: 5/5  Left:  5/5  Hip Adductor:             Right:  5/5  Left:  5/5  Hip Abductor:            "  Right:  5/5  Left:  5/5  Gastroc Soleus:        Right:  5/5  Left:  5/5  Tib/Ant:                      Right:  5/5  Left:  5/5  EHL:                          Right:  5/5  Left:  5/5       Sensation normal to bilateral upper and lower extremities.    Reflexes are 2+ in the patellar and Achilles. There is no clonus. Downgoing Babinski.    Reflexes are 2+ in the brachial radialis and triceps. Negative Samina sign bilaterally.    Finger to Nose smooth  Pronator drift negative    Musculoskeletal:  Gait: Able to stand from a seated position. Normal non-antalgic, non-myelopathic gait.  Able to heel/toe walk without loss of balance    Cervical examination reveals good range of motion.  No tenderness to palpation of the cervical spine or paraspinous muscles bilaterally.    Lumbar examination reveals no tenderness of the spine or paraspinous muscles.  Hip height is symmetrical. Negative SI joint, sciatic notch or greater trochanteric tenderness to palpation bilaterally.  Straight leg raise is negative bilaterally.      Imaging:       Assessment/Plan:     Left-sided low back pain  Left hip and leg pain    Karo Lindsay is a 53 year old female.  I did have a discussion with the patient regarding her history and symptoms.  She is having left-sided low back pain, with pain that radiates into the left hip and thigh.  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  We are obtaining this MRI for further review.  In the meantime, we discussed potential benefits of starting physical therapy, which she did wish to proceed with.  We will contact her with additional follow-up recommendations as indicated, once the MRI is available for review.  She voiced agreement and understanding.          Abdias Aguilar PA-C  [facility] Neurosurgery  [address] [address]    Tel [phone]      The use of Dragon/PowerMic dictation  services may have been used to construct the content in this note; any grammatical or spelling errors are non-intentional. Please contact the author of this note directly if you are in need of any clarification.

## 2023-03-06 NOTE — NURSING NOTE
"Karo Lindsay is a 53 year old female who presents for:  Chief Complaint   Patient presents with     Consult     Left sided low back pain, stabbing pain with numbness        Initial Vitals:  /80   Pulse 93   SpO2 97%  Estimated body mass index is 29.77 kg/m  as calculated from the following:    Height as of 1/6/22: 4' 11\" (1.499 m).    Weight as of 7/7/22: 147 lb 6.4 oz (66.9 kg).. There is no height or weight on file to calculate BSA. BP completed using cuff size: regular  Moderate Pain (5)    Nursing Comments:     Fiordaliza Couch    "

## 2023-03-08 ENCOUNTER — THERAPY VISIT (OUTPATIENT)
Dept: PHYSICAL THERAPY | Facility: CLINIC | Age: 53
End: 2023-03-08
Attending: PHYSICIAN ASSISTANT
Payer: COMMERCIAL

## 2023-03-08 DIAGNOSIS — M54.50 ACUTE LEFT-SIDED LOW BACK PAIN WITHOUT SCIATICA: ICD-10-CM

## 2023-03-08 DIAGNOSIS — M25.552 HIP PAIN, LEFT: ICD-10-CM

## 2023-03-08 PROCEDURE — 97530 THERAPEUTIC ACTIVITIES: CPT | Mod: GP | Performed by: PHYSICAL THERAPIST

## 2023-03-08 PROCEDURE — 97161 PT EVAL LOW COMPLEX 20 MIN: CPT | Mod: GP | Performed by: PHYSICAL THERAPIST

## 2023-03-08 PROCEDURE — 97110 THERAPEUTIC EXERCISES: CPT | Mod: GP | Performed by: PHYSICAL THERAPIST

## 2023-03-08 NOTE — PROGRESS NOTES
Physical Therapy Initial Evaluation  Subjective:  The history is provided by the patient. No  was used.   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 But now in the last couple weeks she has been dealing with intense left sided back pain reason. The pain was so bad the tiny amount of rotation was excruciating. Resting and tyelnol helped calmed this pain down. Now at this point the pain is still there but more uncomfortable and hard to walk and sit any kind of distances. .         Type of problem:  Lumbar.    This is a new (3/6/23) condition.  Condition occurred with:  Insidious onset.  Where condition occurred: for unknown reasons.  Patient reports pain:  Lumbar spine left.  Pain is described as aching and is constant.  Pain radiates to:  Gluteals left and thigh left. Pain is the same all the time.  Since onset symptoms are gradually improving.  Associated symptoms:  Numbness and loss of motion/stiffness (bilateral feets since having tubal issue). Exacerbated by: rotating, walking , standing, sitting.  Relieved by: using pillow between her knees.   Special tests included:  X-ray and MRI.    Restrictions due to condition include:  Working in normal job without restrictions.  Barriers include:  None as reported by patient.    Patient Health History  Karo Lindsay being seen for low back / hip pain.     Problem began: 3/6/2023.   Problem occurred: unknown   Pain is reported as 6/10 on pain scale.  General health as reported by patient is good.  Pertinent medical history includes: history of fractures, kidney disease, numbness/tingling and osteoporosis.   Red flags:  None as reported by patient.   Other medical allergies details: see chart.   Surgeries include:  Orthopedic surgery and other. Other surgery history details:  kidney removed, pelvis fused. .    Current medications:  None.    Current occupation is adminstration.   Primary job tasks include:  Computer work and prolonged standing.                                    Objective:  Standing Alignment:      Shoulder/upper extremity deviations alignment: left trunk rotation. left lateral shift. right foot ER. elevated right pelvis.                             Lumbar/SI Evaluation    Lumbar Myotomes:    T12-L3 (Hip Flex):  Left: 4    Right: 5  L2-4 (Quads):  Left:  5    Right:  5  L4 (Ankle DF):  Left:  4    Right:  5  L5 (Great Toe Ext): Left: 4    Right: 5   S1 (Toe Raise):  Left: 4    Right: 5      Lumbar Dermtomes:  normal                Neural Tension/Mobility:      Left side:SLR or Slump  negative.     Lumbar Palpation:    Tenderness present at Left:    Piriformis and Gluteus Medius  Tenderness not present at Left:    Quadratus Lumborum; Erector Spinae or Greater Trochanter      Lumbar Provocation:  Lumbar provocation: pain with left hip IR.   Left positive with:  PROM hip    Right negative with:  PROM hip                                                   Zara Lumbar Evaluation      Movement Loss:  Flexion (Flex): min and pain  Extension (EXT): mod and pain  Side Angora R (SG R): nil  Side Glide L (SG L): nil  Test Movements:  FIS: During: increases  After: no worse    Repeat FIS: During: increases  After: worse        EIL: During: no effect  After: no effect    Repeat EIL: During: no effect  After: no effect    SGIS R: During: no effect  After: no effect    Repeat SGIS R: During: no effect  After: no effect    SGIS L: During: no effect  After: no effect    Repeat SGIS L: During: no effect  After: no effect                                               ROS    Assessment/Plan:    Patient is a 53 year old female with lumbar complaints.    Patient has the following significant findings with corresponding treatment plan.                Diagnosis 1:  Acute left sided low back  and hip pain  Pain -  hot/cold therapy, US, manual therapy, self management, education, directional preference exercise and home program  Decreased ROM/flexibility - manual therapy, therapeutic exercise, therapeutic activity and home program  Decreased strength - therapeutic exercise, therapeutic activities and home program  Decreased function - therapeutic activities and home program  Impaired posture - neuro re-education, therapeutic activities and home program    Therapy Evaluation Codes:   1) History comprised of:   Personal factors that impact the plan of care:      None.    Comorbidity factors that impact the plan of care are:      osteoporosis, congential non fused pelvis, sacralization of L4-L5. .     Medications impacting care: None.  2) Examination of Body Systems comprised of:   Body structures and functions that impact the plan of care:      Hip and Lumbar spine.   Activity limitations that impact the plan of care are:      Sitting, Standing and Walking.  3) Clinical presentation characteristics are:   Stable/Uncomplicated.  4) Decision-Making    Low complexity using standardized patient assessment instrument and/or measureable assessment of functional outcome.  Cumulative Therapy Evaluation is: Low complexity.    Previous and current functional limitations:  (See Goal Flow Sheet for this information)    Short term and Long term goals: (See Goal Flow Sheet for this information)     Communication ability:  Patient appears to be able to clearly communicate and understand verbal and written communication and follow directions correctly.  Treatment Explanation - The following has been discussed with the patient:   RX ordered/plan of care  Anticipated outcomes  Possible risks and side effects  This patient would benefit from PT intervention to resume normal activities.   Rehab potential is good.    Frequency:  1 X week, once daily  Duration:  for 8 weeks  Discharge Plan:  Achieve all LTG.  Independent in home  treatment program.  Reach maximal therapeutic benefit.    Please refer to the daily flowsheet for treatment today, total treatment time and time spent performing 1:1 timed codes.

## 2023-07-10 DIAGNOSIS — N18.31 STAGE 3A CHRONIC KIDNEY DISEASE (H): Primary | ICD-10-CM

## 2023-07-13 ENCOUNTER — TELEPHONE (OUTPATIENT)
Dept: NEPHROLOGY | Facility: CLINIC | Age: 53
End: 2023-07-13
Payer: COMMERCIAL

## 2023-07-13 NOTE — TELEPHONE ENCOUNTER
Called patient with a appointment reminder for Thurs. 7/20/23 @ 9:00 am with , also you have a lab draw @ 8:00 am    Sim Howell on 7/13/2023 at 9:40 AM

## 2023-07-20 ENCOUNTER — LAB (OUTPATIENT)
Dept: LAB | Facility: CLINIC | Age: 53
End: 2023-07-20
Payer: COMMERCIAL

## 2023-07-20 ENCOUNTER — OFFICE VISIT (OUTPATIENT)
Dept: NEPHROLOGY | Facility: CLINIC | Age: 53
End: 2023-07-20
Attending: INTERNAL MEDICINE
Payer: COMMERCIAL

## 2023-07-20 VITALS
WEIGHT: 162.2 LBS | HEART RATE: 96 BPM | SYSTOLIC BLOOD PRESSURE: 127 MMHG | OXYGEN SATURATION: 94 % | DIASTOLIC BLOOD PRESSURE: 80 MMHG | BODY MASS INDEX: 32.76 KG/M2 | TEMPERATURE: 97.8 F

## 2023-07-20 DIAGNOSIS — N18.31 STAGE 3A CHRONIC KIDNEY DISEASE (H): Primary | ICD-10-CM

## 2023-07-20 DIAGNOSIS — N18.31 STAGE 3A CHRONIC KIDNEY DISEASE (H): ICD-10-CM

## 2023-07-20 DIAGNOSIS — E87.20 METABOLIC ACIDOSIS: Primary | ICD-10-CM

## 2023-07-20 LAB
ALBUMIN MFR UR ELPH: 57.7 MG/DL
ALBUMIN SERPL BCG-MCNC: 4.5 G/DL (ref 3.5–5.2)
ALBUMIN UR-MCNC: 50 MG/DL
ANION GAP SERPL CALCULATED.3IONS-SCNC: 13 MMOL/L (ref 7–15)
APPEARANCE UR: ABNORMAL
BACTERIA #/AREA URNS HPF: ABNORMAL /HPF
BILIRUB UR QL STRIP: NEGATIVE
BUN SERPL-MCNC: 48.3 MG/DL (ref 6–20)
CALCIUM SERPL-MCNC: 9.4 MG/DL (ref 8.6–10)
CHLORIDE SERPL-SCNC: 107 MMOL/L (ref 98–107)
COLOR UR AUTO: ABNORMAL
CREAT SERPL-MCNC: 1.58 MG/DL (ref 0.51–0.95)
CREAT UR-MCNC: 60.4 MG/DL
DEPRECATED CALCIDIOL+CALCIFEROL SERPL-MC: 28 UG/L (ref 20–75)
DEPRECATED HCO3 PLAS-SCNC: 19 MMOL/L (ref 22–29)
ERYTHROCYTE [DISTWIDTH] IN BLOOD BY AUTOMATED COUNT: 12.8 % (ref 10–15)
GFR SERPL CREATININE-BSD FRML MDRD: 38 ML/MIN/1.73M2
GLUCOSE SERPL-MCNC: 172 MG/DL (ref 70–99)
GLUCOSE UR STRIP-MCNC: NEGATIVE MG/DL
HCT VFR BLD AUTO: 39.7 % (ref 35–47)
HGB BLD-MCNC: 13.1 G/DL (ref 11.7–15.7)
HGB UR QL STRIP: ABNORMAL
KETONES UR STRIP-MCNC: NEGATIVE MG/DL
LEUKOCYTE ESTERASE UR QL STRIP: ABNORMAL
MCH RBC QN AUTO: 29.7 PG (ref 26.5–33)
MCHC RBC AUTO-ENTMCNC: 33 G/DL (ref 31.5–36.5)
MCV RBC AUTO: 90 FL (ref 78–100)
NITRATE UR QL: NEGATIVE
PH UR STRIP: 8 [PH] (ref 5–7)
PHOSPHATE SERPL-MCNC: 2.8 MG/DL (ref 2.5–4.5)
PLATELET # BLD AUTO: 231 10E3/UL (ref 150–450)
POTASSIUM SERPL-SCNC: 4.2 MMOL/L (ref 3.4–5.3)
PROT/CREAT 24H UR: 0.96 MG/MG CR (ref 0–0.2)
PTH-INTACT SERPL-MCNC: 32 PG/ML (ref 15–65)
RBC # BLD AUTO: 4.41 10E6/UL (ref 3.8–5.2)
RBC URINE: 22 /HPF
SODIUM SERPL-SCNC: 139 MMOL/L (ref 136–145)
SP GR UR STRIP: 1.01 (ref 1–1.03)
TRI-PHOS CRY #/AREA URNS HPF: ABNORMAL /HPF
UROBILINOGEN UR STRIP-MCNC: NORMAL MG/DL
WBC # BLD AUTO: 7 10E3/UL (ref 4–11)
WBC URINE: 42 /HPF

## 2023-07-20 PROCEDURE — 99000 SPECIMEN HANDLING OFFICE-LAB: CPT | Performed by: PATHOLOGY

## 2023-07-20 PROCEDURE — 81001 URINALYSIS AUTO W/SCOPE: CPT | Performed by: PATHOLOGY

## 2023-07-20 PROCEDURE — 36415 COLL VENOUS BLD VENIPUNCTURE: CPT | Performed by: PATHOLOGY

## 2023-07-20 PROCEDURE — 99213 OFFICE O/P EST LOW 20 MIN: CPT | Performed by: INTERNAL MEDICINE

## 2023-07-20 PROCEDURE — 80069 RENAL FUNCTION PANEL: CPT | Performed by: PATHOLOGY

## 2023-07-20 PROCEDURE — 83970 ASSAY OF PARATHORMONE: CPT | Performed by: PATHOLOGY

## 2023-07-20 PROCEDURE — 84156 ASSAY OF PROTEIN URINE: CPT | Performed by: PATHOLOGY

## 2023-07-20 PROCEDURE — 85027 COMPLETE CBC AUTOMATED: CPT | Performed by: PATHOLOGY

## 2023-07-20 PROCEDURE — 99214 OFFICE O/P EST MOD 30 MIN: CPT | Mod: GC | Performed by: INTERNAL MEDICINE

## 2023-07-20 PROCEDURE — 82306 VITAMIN D 25 HYDROXY: CPT | Performed by: INTERNAL MEDICINE

## 2023-07-20 RX ORDER — POTASSIUM CITRATE 10 MEQ/1
10 TABLET, EXTENDED RELEASE ORAL 2 TIMES DAILY WITH MEALS
Qty: 60 TABLET | Refills: 1 | Status: SHIPPED | OUTPATIENT
Start: 2023-07-20

## 2023-07-20 ASSESSMENT — PAIN SCALES - GENERAL: PAINLEVEL: NO PAIN (0)

## 2023-07-20 NOTE — PROGRESS NOTES
Nephrology Progress Note   7/20/23    Karo Lindsay MRN:[id number] YOB: 1970  Primary care provider: Jamey Adame  Requesting physician: Thu Herron, *    REASON FOR CONSULT: CKD stage 3B, Solitary kidney     HISTORY OF PRESENT ILLNESS:    HPI: Karo Lindsay is a 53 year old female who presents for evaluation of elevated creatinine. The patient has a history of cloacal extrophy of urinary bladder, recurrent kidney stones and UTIs leading to R nephrectomy in 2005 which helped significantly for a period of time. She had a carcinoid tumor of the lung resection in 06/2018. 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 She saw Dr Causey with Urology who recommended revision of her stoma and placed a stent in the left ureter for mild hydronephrosis 7/2019. For the following 4 months after this procedure, she had significant nausea and little PO intake. However, she has not had a UTI since this procedure. She initially presented to the nephrology clinic in 06/2020 with elevated creatinine.    Since our last visit in 07/2022, the patient has been doing well. She denies LE edema, urinary symptoms, stones/UTIs, CP, SOB, lightheadedness, N/V/D. She is consuming 100-150 oz of water daily. She denies any dietary changes, however has gained ~15 lbs in the last year from decreased exercise. The patient had COVID in 11/2022 and has been working more, limiting her energy level. She denies any changes with her ileostomy, the output is the same. She has not been taking the baking soda unfortunately as this causes abdominal irritation and nausea.     She continues to take ketorolac tromethamine ophthalmic solution for her history of bilateral birdshot choroidopathy, but otherwise only takes Maxalt PRN for migraine headaches. Denies use of OTC NSAIDS or other non prescribed meds,  recent exposure to abx or contrast, obstructive urinary symptoms, skin rashes, joint pains, fevers, passing kidney stones.    PAST MEDICAL HISTORY:  Reviewed with patient on 07/20/2023   As per HPI    MEDICATIONS:  Reviewed with the patient in detail    ALLERGIES:    Reviewed with the patient in detail    REVIEW OF SYSTEMS:  A comprehensive of systems was negative except as noted above.    SOCIAL HISTORY:   Reviewed with patient, no smoking and no alcohol use     FAMILY MEDICAL HISTORY:   Reviewed, no family history of need for dialysis, transplant or CKD    PHYSICAL EXAM:   Vital signs:/80   Pulse 96   Temp 97.8  F (36.6  C) (Oral)   Wt 73.6 kg (162 lb 3.2 oz)   SpO2 94%   BMI 32.76 kg/m      Gen: Appears well, no acute distress  Neck: No JVD  Lungs: CTA  CVS: S1S2 normal, no murmurs heard  LE: no edema  Skin: no rashes  CNS: Grossly normal     LABS:  Last Comprehensive Metabolic Panel:  Sodium   Date Value Ref Range Status   07/20/2023 139 136 - 145 mmol/L Final   09/28/2020 136 133 - 144 mmol/L Final     Potassium   Date Value Ref Range Status   07/20/2023 4.2 3.4 - 5.3 mmol/L Final   07/07/2022 4.3 3.4 - 5.3 mmol/L Final   09/28/2020 3.4 3.4 - 5.3 mmol/L Final     Chloride   Date Value Ref Range Status   07/20/2023 107 98 - 107 mmol/L Final   07/07/2022 108 94 - 109 mmol/L Final   09/28/2020 113 (H) 94 - 109 mmol/L Final     Carbon Dioxide   Date Value Ref Range Status   09/28/2020 16 (L) 20 - 32 mmol/L Final     Carbon Dioxide (CO2)   Date Value Ref Range Status   07/20/2023 19 (L) 22 - 29 mmol/L Final   07/07/2022 23 20 - 32 mmol/L Final     Anion Gap   Date Value Ref Range Status   07/20/2023 13 7 - 15 mmol/L Final   07/07/2022 10 3 - 14 mmol/L Final   09/28/2020 6 3 - 14 mmol/L Final     Glucose   Date Value Ref Range Status   07/20/2023 172 (H) 70 - 99 mg/dL Final   07/07/2022 117 (H) 70 - 99 mg/dL Final   09/28/2020 105 (H) 70 - 99 mg/dL Final     Urea Nitrogen   Date Value Ref Range Status    07/20/2023 48.3 (H) 6.0 - 20.0 mg/dL Final   07/07/2022 48 (H) 7 - 30 mg/dL Final   09/28/2020 60 (H) 7 - 30 mg/dL Final     Creatinine   Date Value Ref Range Status   07/20/2023 1.58 (H) 0.51 - 0.95 mg/dL Final   09/28/2020 1.73 (H) 0.52 - 1.04 mg/dL Final     GFR Estimate   Date Value Ref Range Status   07/20/2023 38 (L) >60 mL/min/1.73m2 Final   09/28/2020 33 (L) >60 mL/min/[1.73_m2] Final     Comment:     Non  GFR Calc  Starting 12/18/2018, serum creatinine based estimated GFR (eGFR) will be   calculated using the Chronic Kidney Disease Epidemiology Collaboration   (CKD-EPI) equation.       Calcium   Date Value Ref Range Status   07/20/2023 9.4 8.6 - 10.0 mg/dL Final   09/28/2020 8.6 8.5 - 10.1 mg/dL Final     Bilirubin Total   Date Value Ref Range Status   07/19/2019 0.1 (L) 0.2 - 1.3 mg/dL Final     Alkaline Phosphatase   Date Value Ref Range Status   07/19/2019 45 40 - 150 U/L Final     ALT   Date Value Ref Range Status   07/19/2019 17 0 - 50 U/L Final     AST   Date Value Ref Range Status   07/19/2019 14 0 - 45 U/L Final     CBC RESULTS: Recent Labs   Lab Test 07/20/23  0911   WBC 7.0   RBC 4.41   HGB 13.1   HCT 39.7   MCV 90   MCH 29.7   MCHC 33.0   RDW 12.8        ASSESSMENT AND RECOMMENDATIONS:     # Elevated creatinine in a solitary kidney, R nephrectomy done due to recurrent UTI's  # Ileal conduit urostomy   # NAGMA   # h/o recurrent UTI's  # h/o kidney stones - many years ago, no recent episodes   # 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    Baseline creatinine of 0.7-0.8 mg/dl until 7/2019 when she had ileostomy revision. She also had a lt ureteral stent placed at the same time and did have mild to moderate hydronephrosis which improved overtime, there was some narrowing of the mid left ureter. Her new baseline creatinine after that initial rise is likely 1.3-1.5 mg/dl. She has had some fluctuations/ DELMY's with a peak in Cr to 1.8 however her creatinine  is now back to her baseline, today it is 1.58. UA not very informative as its a conduit sample, Urine P/Cr ratio of 0.96 g/g, which continues to improve over time. Her most recent renal US done 12/21 shows persistent mild lt hydronephrosis however no other concerns. On a loopogram that was done in 10/2020, there was mild narrowing evident at the ureterioileal anastomosis however contrast passed promptly without concern of high grade narrowing.     Her blood pressures are at goal of < 140/90 mm of Hg and there are no signs of hypervolemia on exam. Her serum bicarbonate remains low and her NAGMA is likely from her ureteral diversion ileostomy.     I discussed with her that her CKD is likely from past DELMY/obstructive uropathy in the setting of solitary kidney. No persistent signs of obstruction although there is some narrowing at the ureteroileal anastomosis. She is expected to have fluctuations of her creatinine given very little reserve capacity.     Her bicarb remains low. She is not tolerating the baking soda at home as discussed at the last visit, so she continues to not have any bicarbonate supplementation. She does not want to initiate bicarbonate tablets at this time, so we will try potassium citrate tablets as these are smaller in size. We discussed the impact of persistent metabolic acidosis today, as well as the importance of taking the potassium citrate.    Will f/u in clinic in 6 months with labs due to the persistent acidosis.    Marilyn Mack, DO  PGY-1 Internal Medicine Resident

## 2023-07-20 NOTE — NURSING NOTE
Chief Complaint   Patient presents with     RECHECK       /80   Pulse 96   Temp 97.8  F (36.6  C) (Oral)   Wt 73.6 kg (162 lb 3.2 oz)   SpO2 94%   BMI 32.76 kg/m      Sim Howell on 7/20/2023 at 9:18 AM

## 2023-07-20 NOTE — PROGRESS NOTES
Per Dr Herron, RP, CBC, urine protein, UA faxed to [facility] for 6 months  Valeria Yip LPN  Nephrology  [phone]

## 2023-07-20 NOTE — LETTER
7/20/2023       RE: Karo Lindsay  [address] [facility] [address]     Dear Colleague,    Thank you for referring your patient, Karo Lindsay, to the [facility] [place] at [facility]. Please see a copy of my visit note below.      Nephrology Progress Note   7/20/23    Karo Lindsay MRN:[id number] YOB: 1970  Primary care provider: Jamey Adame  Requesting physician: Thu Herron, *    REASON FOR CONSULT: CKD stage 3B, Solitary kidney     HISTORY OF PRESENT ILLNESS:    HPI: Karo Lindsay is a 53 year old female who presents for evaluation of elevated creatinine. The patient has a history of cloacal extrophy of urinary bladder, recurrent kidney stones and UTIs leading to R nephrectomy in 2005 which helped significantly for a period of time. She had a carcinoid tumor of the lung resection in 06/2018. 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 She saw Dr Causey with Urology who recommended revision of her stoma and placed a stent in the left ureter for mild hydronephrosis 7/2019. For the following 4 months after this procedure, she had significant nausea and little PO intake. However, she has not had a UTI since this procedure. She initially presented to the nephrology clinic in 06/2020 with elevated creatinine.    Since our last visit in 07/2022, the patient has been doing well. She denies LE edema, urinary symptoms, stones/UTIs, CP, SOB, lightheadedness, N/V/D. She is consuming 100-150 oz of water daily. She denies any dietary changes, however has gained ~15 lbs in the last year from decreased exercise. The patient had COVID in 11/2022 and has been working more, limiting her energy level. She denies any changes with her ileostomy, the output is the same. She has not been taking the  baking soda unfortunately as this causes abdominal irritation and nausea.     She continues to take ketorolac tromethamine ophthalmic solution for her history of bilateral birdshot choroidopathy, but otherwise only takes Maxalt PRN for migraine headaches. Denies use of OTC NSAIDS or other non prescribed meds, recent exposure to abx or contrast, obstructive urinary symptoms, skin rashes, joint pains, fevers, passing kidney stones.    PAST MEDICAL HISTORY:  Reviewed with patient on 07/20/2023   As per HPI    MEDICATIONS:  Reviewed with the patient in detail    ALLERGIES:    Reviewed with the patient in detail    REVIEW OF SYSTEMS:  A comprehensive of systems was negative except as noted above.    SOCIAL HISTORY:   Reviewed with patient, no smoking and no alcohol use     FAMILY MEDICAL HISTORY:   Reviewed, no family history of need for dialysis, transplant or CKD    PHYSICAL EXAM:   Vital signs:/80   Pulse 96   Temp 97.8  F (36.6  C) (Oral)   Wt 73.6 kg (162 lb 3.2 oz)   SpO2 94%   BMI 32.76 kg/m      Gen: Appears well, no acute distress  Neck: No JVD  Lungs: CTA  CVS: S1S2 normal, no murmurs heard  LE: no edema  Skin: no rashes  CNS: Grossly normal     LABS:  Last Comprehensive Metabolic Panel:  Sodium   Date Value Ref Range Status   07/20/2023 139 136 - 145 mmol/L Final   09/28/2020 136 133 - 144 mmol/L Final     Potassium   Date Value Ref Range Status   07/20/2023 4.2 3.4 - 5.3 mmol/L Final   07/07/2022 4.3 3.4 - 5.3 mmol/L Final   09/28/2020 3.4 3.4 - 5.3 mmol/L Final     Chloride   Date Value Ref Range Status   07/20/2023 107 98 - 107 mmol/L Final   07/07/2022 108 94 - 109 mmol/L Final   09/28/2020 113 (H) 94 - 109 mmol/L Final     Carbon Dioxide   Date Value Ref Range Status   09/28/2020 16 (L) 20 - 32 mmol/L Final     Carbon Dioxide (CO2)   Date Value Ref Range Status   07/20/2023 19 (L) 22 - 29 mmol/L Final   07/07/2022 23 20 - 32 mmol/L Final     Anion Gap   Date Value Ref Range Status   07/20/2023  13 7 - 15 mmol/L Final   07/07/2022 10 3 - 14 mmol/L Final   09/28/2020 6 3 - 14 mmol/L Final     Glucose   Date Value Ref Range Status   07/20/2023 172 (H) 70 - 99 mg/dL Final   07/07/2022 117 (H) 70 - 99 mg/dL Final   09/28/2020 105 (H) 70 - 99 mg/dL Final     Urea Nitrogen   Date Value Ref Range Status   07/20/2023 48.3 (H) 6.0 - 20.0 mg/dL Final   07/07/2022 48 (H) 7 - 30 mg/dL Final   09/28/2020 60 (H) 7 - 30 mg/dL Final     Creatinine   Date Value Ref Range Status   07/20/2023 1.58 (H) 0.51 - 0.95 mg/dL Final   09/28/2020 1.73 (H) 0.52 - 1.04 mg/dL Final     GFR Estimate   Date Value Ref Range Status   07/20/2023 38 (L) >60 mL/min/1.73m2 Final   09/28/2020 33 (L) >60 mL/min/[1.73_m2] Final     Comment:     Non  GFR Calc  Starting 12/18/2018, serum creatinine based estimated GFR (eGFR) will be   calculated using the Chronic Kidney Disease Epidemiology Collaboration   (CKD-EPI) equation.       Calcium   Date Value Ref Range Status   07/20/2023 9.4 8.6 - 10.0 mg/dL Final   09/28/2020 8.6 8.5 - 10.1 mg/dL Final     Bilirubin Total   Date Value Ref Range Status   07/19/2019 0.1 (L) 0.2 - 1.3 mg/dL Final     Alkaline Phosphatase   Date Value Ref Range Status   07/19/2019 45 40 - 150 U/L Final     ALT   Date Value Ref Range Status   07/19/2019 17 0 - 50 U/L Final     AST   Date Value Ref Range Status   07/19/2019 14 0 - 45 U/L Final     CBC RESULTS: Recent Labs   Lab Test 07/20/23  0911   WBC 7.0   RBC 4.41   HGB 13.1   HCT 39.7   MCV 90   MCH 29.7   MCHC 33.0   RDW 12.8        ASSESSMENT AND RECOMMENDATIONS:     # Elevated creatinine in a solitary kidney, R nephrectomy done due to recurrent UTI's  # Ileal conduit urostomy   # NAGMA   # h/o recurrent UTI's  # h/o kidney stones - many years ago, no recent episodes   # 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    Baseline creatinine of 0.7-0.8 mg/dl until 7/2019 when she had ileostomy revision. She also had a lt ureteral  stent placed at the same time and did have mild to moderate hydronephrosis which improved overtime, there was some narrowing of the mid left ureter. Her new baseline creatinine after that initial rise is likely 1.3-1.5 mg/dl. She has had some fluctuations/ DELMY's with a peak in Cr to 1.8 however her creatinine is now back to her baseline, today it is 1.58. UA not very informative as its a conduit sample, Urine P/Cr ratio of 0.96 g/g, which continues to improve over time. Her most recent renal US done 12/21 shows persistent mild lt hydronephrosis however no other concerns. On a loopogram that was done in 10/2020, there was mild narrowing evident at the ureterioileal anastomosis however contrast passed promptly without concern of high grade narrowing.     Her blood pressures are at goal of < 140/90 mm of Hg and there are no signs of hypervolemia on exam. Her serum bicarbonate remains low and her NAGMA is likely from her ureteral diversion ileostomy.     I discussed with her that her CKD is likely from past DELMY/obstructive uropathy in the setting of solitary kidney. No persistent signs of obstruction although there is some narrowing at the ureteroileal anastomosis. She is expected to have fluctuations of her creatinine given very little reserve capacity.     Her bicarb remains low. She is not tolerating the baking soda at home as discussed at the last visit, so she continues to not have any bicarbonate supplementation. She does not want to initiate bicarbonate tablets at this time, so we will try potassium citrate tablets as these are smaller in size. We discussed the impact of persistent metabolic acidosis today, as well as the importance of taking the potassium citrate.    Will f/u in clinic in 6 months with labs due to the persistent acidosis.    Marilyn Mack DO  PGY-1 Internal Medicine Resident    Attestation signed by Thu Herron MD at 7/20/2023 10:27 AM:  I have seen and evaluated the patient.  Discussed with the resident and agree with resident's findings and plan as documented in the resident's note.  I have reviewed today's vital signs, notes, medications, labs and imaging.   Thu Herron MD, MD

## 2023-09-18 NOTE — TELEPHONE ENCOUNTER
49 year old, complex medical history cloacal extrophy, single kidney, ileal conduit on Levaquin since Saturday for UTI with ongoing kidney pain. Had loopogram today without signs of obstruction. She calls in with ongoing acute pain at kidney, denies fevers/chills, denies nausea/vomiting.  - Encouraged good hydration  - ER for fevers/chills in setting of known infection  - Can try pyridium for a couple days -confirmed normal renal function - for ongoing pain. She is already taking Tylenol and does not take Ibuprofen due to single kidney       The patient's assessment was reviewed with Dr. Gonzalez and a collaborative plan of care was established.

## 2024-01-15 ENCOUNTER — TELEPHONE (OUTPATIENT)
Dept: NEPHROLOGY | Facility: CLINIC | Age: 54
End: 2024-01-15
Payer: COMMERCIAL

## 2024-01-15 NOTE — TELEPHONE ENCOUNTER
Appointment reminder.  Called pt and lvm for scheduled appt on 1/22 at 9:00 am in person with non-fasting labs at 8:00 am.  Daisy Victoria,  Nephrology Clinic Navigator  [facility]  Direct: [phone].

## 2025-01-13 DIAGNOSIS — N18.32 STAGE 3B CHRONIC KIDNEY DISEASE (H): ICD-10-CM

## 2025-01-13 DIAGNOSIS — N18.32 CHRONIC KIDNEY DISEASE (CKD) STAGE G3B/A1, MODERATELY DECREASED GLOMERULAR FILTRATION RATE (GFR) BETWEEN 30-44 ML/MIN/1.73 SQUARE METER AND ALBUMINURIA CREATININE RATIO LESS THAN 30 MG/G (H): Primary | ICD-10-CM

## 2025-02-13 DIAGNOSIS — N18.32 CHRONIC KIDNEY DISEASE (CKD) STAGE G3B/A1, MODERATELY DECREASED GLOMERULAR FILTRATION RATE (GFR) BETWEEN 30-44 ML/MIN/1.73 SQUARE METER AND ALBUMINURIA CREATININE RATIO LESS THAN 30 MG/G (H): Primary | ICD-10-CM

## 2025-03-13 ENCOUNTER — LAB (OUTPATIENT)
Dept: LAB | Facility: CLINIC | Age: 55
End: 2025-03-13
Payer: COMMERCIAL

## 2025-03-13 DIAGNOSIS — N18.32 CHRONIC KIDNEY DISEASE (CKD) STAGE G3B/A1, MODERATELY DECREASED GLOMERULAR FILTRATION RATE (GFR) BETWEEN 30-44 ML/MIN/1.73 SQUARE METER AND ALBUMINURIA CREATININE RATIO LESS THAN 30 MG/G (H): ICD-10-CM

## 2025-04-21 DIAGNOSIS — N18.32 STAGE 3B CHRONIC KIDNEY DISEASE (H): Primary | ICD-10-CM

## 2025-08-21 ENCOUNTER — LAB (OUTPATIENT)
Dept: LAB | Facility: CLINIC | Age: 55
End: 2025-08-21
Payer: COMMERCIAL

## 2025-08-21 DIAGNOSIS — N18.32 STAGE 3B CHRONIC KIDNEY DISEASE (H): ICD-10-CM

## 2025-08-21 LAB
ALBUMIN MFR UR ELPH: 53.1 MG/DL
ALBUMIN SERPL BCG-MCNC: 3.9 G/DL (ref 3.5–5.2)
ANION GAP SERPL CALCULATED.3IONS-SCNC: 13 MMOL/L (ref 7–15)
BUN SERPL-MCNC: 55.8 MG/DL (ref 6–20)
CALCIUM SERPL-MCNC: 8.9 MG/DL (ref 8.8–10.4)
CHLORIDE SERPL-SCNC: 103 MMOL/L (ref 98–107)
CREAT SERPL-MCNC: 1.45 MG/DL (ref 0.51–0.95)
CREAT UR-MCNC: 46.4 MG/DL
EGFRCR SERPLBLD CKD-EPI 2021: 42 ML/MIN/1.73M2
ERYTHROCYTE [DISTWIDTH] IN BLOOD BY AUTOMATED COUNT: 12.7 % (ref 10–15)
GLUCOSE SERPL-MCNC: 216 MG/DL (ref 70–99)
HCO3 SERPL-SCNC: 14 MMOL/L (ref 22–29)
HCT VFR BLD AUTO: 38.6 % (ref 35–47)
HGB BLD-MCNC: 13.4 G/DL (ref 11.7–15.7)
MCH RBC QN AUTO: 30.4 PG (ref 26.5–33)
MCHC RBC AUTO-ENTMCNC: 34.7 G/DL (ref 31.5–36.5)
MCV RBC AUTO: 87.5 FL (ref 78–100)
PHOSPHATE SERPL-MCNC: 3.6 MG/DL (ref 2.5–4.5)
PLATELET # BLD AUTO: 228 10E3/UL (ref 150–450)
POTASSIUM SERPL-SCNC: 4.8 MMOL/L (ref 3.4–5.3)
PROT/CREAT 24H UR: 1.14 MG/MG CR (ref 0–0.2)
PTH-INTACT SERPL-MCNC: 15 PG/ML (ref 15–65)
RBC # BLD AUTO: 4.41 10E6/UL (ref 3.8–5.2)
SODIUM SERPL-SCNC: 130 MMOL/L (ref 135–145)
VIT D+METAB SERPL-MCNC: 36 NG/ML (ref 20–50)
WBC # BLD AUTO: 7.67 10E3/UL (ref 4–11)

## 2025-08-21 PROCEDURE — 82306 VITAMIN D 25 HYDROXY: CPT

## 2025-08-21 PROCEDURE — 85027 COMPLETE CBC AUTOMATED: CPT

## 2025-08-21 PROCEDURE — 36415 COLL VENOUS BLD VENIPUNCTURE: CPT

## 2025-08-21 PROCEDURE — 83970 ASSAY OF PARATHORMONE: CPT

## 2025-08-21 PROCEDURE — 80069 RENAL FUNCTION PANEL: CPT

## 2025-08-21 PROCEDURE — 84156 ASSAY OF PROTEIN URINE: CPT

## (undated) DEVICE — ESU LIGASURE IMPACT OPEN SEALER/DVDR CVD LG JAW LF4418

## (undated) DEVICE — SPONGE LAP 18X18" X8435

## (undated) DEVICE — VESSEL LOOPS BLUE SUPERMAXI 011022PBX

## (undated) DEVICE — KIT NEW IMAGE COLOSTOMY/ILEOSTOMY 2 3/4" LF 19354

## (undated) DEVICE — SUCTION MANIFOLD DORNOCH ULTRA CART UL-CL500

## (undated) DEVICE — SU SILK 0 TIE 6X30" A306H

## (undated) DEVICE — PACK AB HYST II

## (undated) DEVICE — KIT UROSTOMY POUCH 2 3/4" 19254

## (undated) DEVICE — SU SILK 3-0 SH CR 8X18" C013D

## (undated) DEVICE — CLIP HORIZON LG ORANGE 004200

## (undated) DEVICE — TUBING IRRIG CYSTO/BLADDER SET 81" LF 2C4040

## (undated) DEVICE — CATH URETERAL OPEN END 05FR 70CM 020015

## (undated) DEVICE — SYR 70ML TOOMEY 041170

## (undated) DEVICE — ESU ELEC BLADE 6" COATED E1450-6

## (undated) DEVICE — SU SILK 4-0 TIE 12X30" A303H

## (undated) DEVICE — LINEN GOWN XLG 5407

## (undated) DEVICE — SU VICRYL 3-0 SH 8X18" UND J864D

## (undated) DEVICE — SU VICRYL 4-0 RB-1 27" UND J214H

## (undated) DEVICE — SOL WATER IRRIG 1000ML BOTTLE 2F7114

## (undated) DEVICE — SUTURE BOOTS 051003PBX

## (undated) DEVICE — Device

## (undated) DEVICE — SU PDS II 0 TP-1 60" Z991G

## (undated) DEVICE — SU PDS II 5-0 RB-1 DA 30" Z320H

## (undated) DEVICE — BAG URINARY DRAIN LUBRISIL IC 4000ML LF 253509A

## (undated) DEVICE — SU SILK 3-0 TIE 12X30" A304H

## (undated) DEVICE — ESU PENCIL W/COATED BLADE E2450H

## (undated) DEVICE — SU VICRYL 3-0 SH 27" UND J416H

## (undated) DEVICE — SU VICRYL 0 UR-6 27" J603H

## (undated) DEVICE — DRAIN JACKSON PRATT RESERVOIR 100ML SU130-1305

## (undated) DEVICE — PREP POVIDONE IODINE SOLUTION 10% 4OZ

## (undated) DEVICE — DRAIN JACKSON PRATT CHANNEL 19FR ROUND HUBLESS SIL JP-2230

## (undated) DEVICE — LINEN TOWEL PACK X30 5481

## (undated) DEVICE — WIPES FOLEY CARE SURESTEP PROVON DFC100

## (undated) DEVICE — SU VICRYL 4-0 RB-1 27" J304

## (undated) DEVICE — JELLY LUBRICATING SURGILUBE 2OZ TUBE

## (undated) DEVICE — SOL NACL 0.9% IRRIG 1000ML BOTTLE 2F7124

## (undated) DEVICE — VESSEL LOOPS YELLOW MAXI 31145694

## (undated) DEVICE — LINEN TOWEL PACK X6 WHITE 5487

## (undated) DEVICE — STPL SKIN PROXIMATE 35 WIDE PMW35

## (undated) DEVICE — GUIDEWIRE SENSOR DUAL FLEX STR 0.035"X150CM M0066703080

## (undated) DEVICE — SU SILK 2-0 SH 30" K833H

## (undated) DEVICE — SU SILK 2-0 TIE 12X30" A305H

## (undated) DEVICE — DRAPE IOBAN INCISE 23X17" 6650EZ

## (undated) RX ORDER — FENTANYL CITRATE 50 UG/ML
INJECTION, SOLUTION INTRAMUSCULAR; INTRAVENOUS
Status: DISPENSED
Start: 2019-07-10

## (undated) RX ORDER — HYDROMORPHONE HYDROCHLORIDE 1 MG/ML
INJECTION, SOLUTION INTRAMUSCULAR; INTRAVENOUS; SUBCUTANEOUS
Status: DISPENSED
Start: 2019-07-10

## (undated) RX ORDER — SCOLOPAMINE TRANSDERMAL SYSTEM 1 MG/1
PATCH, EXTENDED RELEASE TRANSDERMAL
Status: DISPENSED
Start: 2019-07-10

## (undated) RX ORDER — LIDOCAINE HYDROCHLORIDE 20 MG/ML
INJECTION, SOLUTION EPIDURAL; INFILTRATION; INTRACAUDAL; PERINEURAL
Status: DISPENSED
Start: 2019-07-10

## (undated) RX ORDER — LIDOCAINE HYDROCHLORIDE 10 MG/ML
INJECTION, SOLUTION EPIDURAL; INFILTRATION; INTRACAUDAL; PERINEURAL
Status: DISPENSED
Start: 2019-07-09

## (undated) RX ORDER — DEXAMETHASONE SODIUM PHOSPHATE 4 MG/ML
INJECTION, SOLUTION INTRA-ARTICULAR; INTRALESIONAL; INTRAMUSCULAR; INTRAVENOUS; SOFT TISSUE
Status: DISPENSED
Start: 2019-07-10

## (undated) RX ORDER — PROPOFOL 10 MG/ML
INJECTION, EMULSION INTRAVENOUS
Status: DISPENSED
Start: 2019-07-10

## (undated) RX ORDER — ONDANSETRON 2 MG/ML
INJECTION INTRAMUSCULAR; INTRAVENOUS
Status: DISPENSED
Start: 2019-07-10

## (undated) RX ORDER — ERTAPENEM 1 G/1
INJECTION, POWDER, LYOPHILIZED, FOR SOLUTION INTRAMUSCULAR; INTRAVENOUS
Status: DISPENSED
Start: 2019-07-10

## (undated) RX ORDER — LABETALOL 20 MG/4 ML (5 MG/ML) INTRAVENOUS SYRINGE
Status: DISPENSED
Start: 2019-07-10

## (undated) RX ORDER — DIPHENHYDRAMINE HYDROCHLORIDE 50 MG/ML
INJECTION INTRAMUSCULAR; INTRAVENOUS
Status: DISPENSED
Start: 2019-07-10